# Patient Record
Sex: FEMALE | Race: WHITE | NOT HISPANIC OR LATINO | Employment: OTHER | URBAN - METROPOLITAN AREA
[De-identification: names, ages, dates, MRNs, and addresses within clinical notes are randomized per-mention and may not be internally consistent; named-entity substitution may affect disease eponyms.]

---

## 2023-02-03 PROBLEM — R10.819 ABDOMINAL TENDERNESS: Status: ACTIVE | Noted: 2023-02-03

## 2023-02-03 PROBLEM — J45.909 ASTHMA (HHS-HCC): Status: ACTIVE | Noted: 2023-02-03

## 2023-02-03 PROBLEM — R00.2 PALPITATIONS: Status: ACTIVE | Noted: 2023-02-03

## 2023-02-03 PROBLEM — M54.2 NECK PAIN: Status: ACTIVE | Noted: 2023-02-03

## 2023-02-03 PROBLEM — Z86.79 HISTORY OF ATRIAL FLUTTER: Status: ACTIVE | Noted: 2023-02-03

## 2023-02-03 PROBLEM — M65.30 TRIGGER FINGER: Status: ACTIVE | Noted: 2023-02-03

## 2023-02-03 PROBLEM — M25.562 PAIN IN LEFT KNEE: Status: ACTIVE | Noted: 2023-02-03

## 2023-02-03 PROBLEM — K80.20 GALLSTONES: Status: ACTIVE | Noted: 2023-02-03

## 2023-02-03 PROBLEM — I47.20 VENTRICULAR TACHYCARDIA (PAROXYSMAL): Status: ACTIVE | Noted: 2023-02-03

## 2023-02-03 PROBLEM — R79.89 ABNORMAL CBC: Status: ACTIVE | Noted: 2023-02-03

## 2023-02-03 PROBLEM — L30.9 ECZEMA: Status: ACTIVE | Noted: 2023-02-03

## 2023-02-03 PROBLEM — R53.1 DECREASED STRENGTH: Status: ACTIVE | Noted: 2023-02-03

## 2023-02-03 PROBLEM — M54.50 LOW BACK PAIN: Status: ACTIVE | Noted: 2023-02-03

## 2023-02-03 PROBLEM — L21.9 SEBORRHEIC DERMATITIS: Status: ACTIVE | Noted: 2023-02-03

## 2023-02-03 PROBLEM — R26.89 BALANCE PROBLEM: Status: ACTIVE | Noted: 2023-02-03
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
## 2023-02-03 PROBLEM — R63.4 WEIGHT LOSS: Status: ACTIVE | Noted: 2023-02-03

## 2023-02-03 PROBLEM — M54.16 LEFT LUMBAR RADICULOPATHY: Status: ACTIVE | Noted: 2023-02-03

## 2023-02-03 PROBLEM — R11.0 NAUSEA: Status: ACTIVE | Noted: 2023-02-03

## 2023-02-03 PROBLEM — R01.1 HEART MURMUR: Status: ACTIVE | Noted: 2023-02-03

## 2023-02-03 PROBLEM — M17.0 PRIMARY OSTEOARTHRITIS OF KNEES, BILATERAL: Status: ACTIVE | Noted: 2023-02-03

## 2023-02-03 PROBLEM — R25.2 CRAMPING OF HANDS: Status: ACTIVE | Noted: 2023-02-03

## 2023-02-03 PROBLEM — R06.02 SHORTNESS OF BREATH: Status: ACTIVE | Noted: 2023-02-03

## 2023-02-03 PROBLEM — R19.7 DIARRHEA: Status: ACTIVE | Noted: 2023-02-03

## 2023-02-03 PROBLEM — F41.9 ANXIETY: Status: ACTIVE | Noted: 2023-02-03

## 2023-02-03 PROBLEM — M25.552 LEFT HIP PAIN: Status: ACTIVE | Noted: 2023-02-03

## 2023-02-03 PROBLEM — M85.852 OSTEOPENIA OF LEFT FEMORAL NECK: Status: ACTIVE | Noted: 2023-02-03

## 2023-02-03 PROBLEM — I73.00 RAYNAUD'S PHENOMENON: Status: ACTIVE | Noted: 2023-02-03

## 2023-02-03 PROBLEM — M47.9 SPINE DEGENERATION: Status: ACTIVE | Noted: 2023-02-03

## 2023-02-03 PROBLEM — I25.10 CORONARY ARTERY DISEASE: Status: ACTIVE | Noted: 2023-02-03

## 2023-02-03 PROBLEM — D22.9 ATYPICAL MOLE: Status: ACTIVE | Noted: 2023-02-03

## 2023-02-03 PROBLEM — E03.9 HYPOTHYROIDISM, ADULT: Status: ACTIVE | Noted: 2023-02-03

## 2023-02-03 PROBLEM — H93.19 TINNITUS: Status: ACTIVE | Noted: 2023-02-03

## 2023-02-03 PROBLEM — R42 POSTURAL DIZZINESS: Status: ACTIVE | Noted: 2023-02-03

## 2023-02-03 PROBLEM — I10 ESSENTIAL HYPERTENSION: Status: ACTIVE | Noted: 2023-02-03

## 2023-02-03 PROBLEM — D64.9 LOW HEMOGLOBIN: Status: ACTIVE | Noted: 2023-02-03

## 2023-02-03 PROBLEM — R06.09 DOE (DYSPNEA ON EXERTION): Status: ACTIVE | Noted: 2023-02-03

## 2023-02-03 PROBLEM — R73.09 ELEVATED GLUCOSE: Status: ACTIVE | Noted: 2023-02-03

## 2023-02-03 PROBLEM — R94.31 ABNORMAL ECG: Status: ACTIVE | Noted: 2023-02-03

## 2023-02-03 PROBLEM — I47.29 VENTRICULAR TACHYCARDIA (PAROXYSMAL) (MULTI): Status: ACTIVE | Noted: 2023-02-03

## 2023-02-03 PROBLEM — R73.9 ELEVATED BLOOD SUGAR: Status: ACTIVE | Noted: 2023-02-03

## 2023-02-03 PROBLEM — E78.5 HYPERLIPIDEMIA: Status: ACTIVE | Noted: 2023-02-03

## 2023-02-03 PROBLEM — Z86.79 HISTORY OF PSVT (PAROXYSMAL SUPRAVENTRICULAR TACHYCARDIA): Status: ACTIVE | Noted: 2023-02-03

## 2023-02-03 PROBLEM — I48.0 PAROXYSMAL ATRIAL FIBRILLATION (MULTI): Status: ACTIVE | Noted: 2023-02-03

## 2023-02-03 PROBLEM — R13.19 OTHER DYSPHAGIA: Status: ACTIVE | Noted: 2023-02-03

## 2023-02-03 PROBLEM — F39 MOOD DISORDER (CMS-HCC): Status: ACTIVE | Noted: 2023-02-03

## 2023-02-03 PROBLEM — E53.8 VITAMIN B12 DEFICIENCY: Status: ACTIVE | Noted: 2023-02-03

## 2023-02-03 PROBLEM — S22.089A: Status: ACTIVE | Noted: 2023-02-03

## 2023-02-03 PROBLEM — R09.89 PALPABLE ABD. AORTA: Status: ACTIVE | Noted: 2023-02-03

## 2023-02-03 PROBLEM — E83.42 HYPOMAGNESEMIA: Status: ACTIVE | Noted: 2023-02-03

## 2023-02-03 PROBLEM — R07.89 ATYPICAL CHEST PAIN: Status: ACTIVE | Noted: 2023-02-03

## 2023-02-03 PROBLEM — K21.9 GERD (GASTROESOPHAGEAL REFLUX DISEASE): Status: ACTIVE | Noted: 2023-02-03

## 2023-02-03 PROBLEM — F33.9 RECURRENT MAJOR DEPRESSIVE DISORDER (CMS-HCC): Status: ACTIVE | Noted: 2023-02-03

## 2023-02-03 PROBLEM — R01.1 SYSTOLIC MURMUR: Status: ACTIVE | Noted: 2023-02-03

## 2023-02-03 PROBLEM — E66.3 OVERWEIGHT: Status: ACTIVE | Noted: 2023-02-03

## 2023-02-03 PROBLEM — L30.9 DERMATITIS: Status: ACTIVE | Noted: 2023-02-03

## 2023-02-03 PROBLEM — E55.9 VITAMIN D DEFICIENCY: Status: ACTIVE | Noted: 2023-02-03

## 2023-02-03 PROBLEM — E61.2 MAGNESIUM DEFICIENCY: Status: ACTIVE | Noted: 2023-02-03

## 2023-02-03 PROBLEM — G47.00 INSOMNIA: Status: ACTIVE | Noted: 2023-02-03

## 2023-02-03 RX ORDER — ROSUVASTATIN CALCIUM 20 MG/1
20 TABLET, COATED ORAL NIGHTLY
COMMUNITY

## 2023-02-03 RX ORDER — ALPRAZOLAM 0.25 MG/1
0.25 TABLET ORAL 2 TIMES DAILY PRN
COMMUNITY
Start: 2019-02-14 | End: 2023-03-17 | Stop reason: SDUPTHER

## 2023-02-03 RX ORDER — ATENOLOL 50 MG/1
TABLET ORAL
COMMUNITY
Start: 2019-03-12 | End: 2023-03-17

## 2023-02-03 RX ORDER — LEVOTHYROXINE SODIUM 125 UG/1
125 TABLET ORAL DAILY
COMMUNITY
Start: 2019-11-04 | End: 2023-06-09 | Stop reason: SDUPTHER

## 2023-02-03 RX ORDER — VALSARTAN 160 MG/1
160 TABLET ORAL DAILY
COMMUNITY
End: 2023-03-17 | Stop reason: SDDI

## 2023-02-03 RX ORDER — ASPIRIN 81 MG/1
81 TABLET ORAL DAILY
COMMUNITY

## 2023-02-03 RX ORDER — OMEPRAZOLE 20 MG/1
20 TABLET, DELAYED RELEASE ORAL DAILY
COMMUNITY
Start: 2022-06-24 | End: 2023-03-17

## 2023-02-03 RX ORDER — BECLOMETHASONE DIPROPIONATE HFA 40 UG/1
40 AEROSOL, METERED RESPIRATORY (INHALATION)
COMMUNITY
Start: 2022-08-31 | End: 2023-11-14 | Stop reason: WASHOUT

## 2023-02-03 RX ORDER — ZOLPIDEM TARTRATE 5 MG/1
5 TABLET ORAL NIGHTLY PRN
COMMUNITY
End: 2023-03-16 | Stop reason: SDUPTHER

## 2023-02-03 RX ORDER — BUPROPION HYDROCHLORIDE 150 MG/1
150 TABLET ORAL 2 TIMES DAILY
COMMUNITY
Start: 2021-06-18 | End: 2023-06-09 | Stop reason: SDUPTHER

## 2023-02-03 RX ORDER — KETOCONAZOLE 20 MG/ML
SHAMPOO, SUSPENSION TOPICAL
COMMUNITY
Start: 2019-08-19 | End: 2023-08-16 | Stop reason: SDUPTHER

## 2023-02-03 RX ORDER — NALOXONE HYDROCHLORIDE 4 MG/.1ML
4 SPRAY NASAL AS NEEDED
COMMUNITY
End: 2024-05-02 | Stop reason: ENTERED-IN-ERROR

## 2023-02-03 RX ORDER — SERTRALINE HYDROCHLORIDE 50 MG/1
50 TABLET, FILM COATED ORAL
COMMUNITY
End: 2023-08-16 | Stop reason: ALTCHOICE

## 2023-02-03 RX ORDER — ALBUTEROL SULFATE 90 UG/1
2 AEROSOL, METERED RESPIRATORY (INHALATION) EVERY 4 HOURS PRN
COMMUNITY
End: 2023-08-29 | Stop reason: SDUPTHER

## 2023-02-03 RX ORDER — TRAMADOL HYDROCHLORIDE 50 MG/1
50 TABLET ORAL 2 TIMES DAILY PRN
COMMUNITY
Start: 2022-06-24 | End: 2023-04-19 | Stop reason: SDUPTHER

## 2023-03-16 ENCOUNTER — TELEPHONE (OUTPATIENT)
Dept: PRIMARY CARE | Facility: CLINIC | Age: 78
End: 2023-03-16
Payer: MEDICARE

## 2023-03-16 DIAGNOSIS — F51.01 PRIMARY INSOMNIA: ICD-10-CM

## 2023-03-16 RX ORDER — ZOLPIDEM TARTRATE 5 MG/1
5 TABLET ORAL NIGHTLY PRN
Qty: 30 TABLET | Refills: 0 | Status: SHIPPED | OUTPATIENT
Start: 2023-03-16 | End: 2023-03-17 | Stop reason: SDUPTHER

## 2023-03-17 ENCOUNTER — LAB (OUTPATIENT)
Dept: LAB | Facility: LAB | Age: 78
End: 2023-03-17
Payer: MEDICARE

## 2023-03-17 ENCOUNTER — OFFICE VISIT (OUTPATIENT)
Dept: PRIMARY CARE | Facility: CLINIC | Age: 78
End: 2023-03-17
Payer: MEDICARE

## 2023-03-17 VITALS
DIASTOLIC BLOOD PRESSURE: 81 MMHG | HEART RATE: 88 BPM | SYSTOLIC BLOOD PRESSURE: 144 MMHG | HEIGHT: 63 IN | BODY MASS INDEX: 27.64 KG/M2 | WEIGHT: 156 LBS

## 2023-03-17 DIAGNOSIS — Z79.899 MEDICATION MANAGEMENT: ICD-10-CM

## 2023-03-17 DIAGNOSIS — F33.9 RECURRENT MAJOR DEPRESSIVE DISORDER, REMISSION STATUS UNSPECIFIED (CMS-HCC): ICD-10-CM

## 2023-03-17 DIAGNOSIS — E03.9 HYPOTHYROIDISM, ADULT: ICD-10-CM

## 2023-03-17 DIAGNOSIS — E83.42 HYPOMAGNESEMIA: ICD-10-CM

## 2023-03-17 DIAGNOSIS — E53.8 VITAMIN B12 DEFICIENCY: ICD-10-CM

## 2023-03-17 DIAGNOSIS — E55.9 VITAMIN D DEFICIENCY: ICD-10-CM

## 2023-03-17 DIAGNOSIS — Z00.00 WELL ADULT EXAM: ICD-10-CM

## 2023-03-17 DIAGNOSIS — F39 MOOD DISORDER (CMS-HCC): ICD-10-CM

## 2023-03-17 DIAGNOSIS — M54.42 LEFT-SIDED LOW BACK PAIN WITH LEFT-SIDED SCIATICA, UNSPECIFIED CHRONICITY: ICD-10-CM

## 2023-03-17 DIAGNOSIS — F51.01 PRIMARY INSOMNIA: ICD-10-CM

## 2023-03-17 DIAGNOSIS — G47.00 INSOMNIA, UNSPECIFIED TYPE: ICD-10-CM

## 2023-03-17 DIAGNOSIS — J45.909 ASTHMA, UNSPECIFIED ASTHMA SEVERITY, UNSPECIFIED WHETHER COMPLICATED, UNSPECIFIED WHETHER PERSISTENT (HHS-HCC): ICD-10-CM

## 2023-03-17 DIAGNOSIS — I48.0 PAROXYSMAL ATRIAL FIBRILLATION (MULTI): ICD-10-CM

## 2023-03-17 DIAGNOSIS — I10 ESSENTIAL HYPERTENSION: ICD-10-CM

## 2023-03-17 DIAGNOSIS — K21.9 GASTROESOPHAGEAL REFLUX DISEASE WITHOUT ESOPHAGITIS: ICD-10-CM

## 2023-03-17 DIAGNOSIS — Z00.00 WELL ADULT EXAM: Primary | ICD-10-CM

## 2023-03-17 DIAGNOSIS — E78.2 MIXED HYPERLIPIDEMIA: ICD-10-CM

## 2023-03-17 PROBLEM — R19.7 DIARRHEA: Status: RESOLVED | Noted: 2023-02-03 | Resolved: 2023-03-17

## 2023-03-17 PROBLEM — M65.30 TRIGGER FINGER: Status: RESOLVED | Noted: 2023-02-03 | Resolved: 2023-03-17

## 2023-03-17 PROBLEM — R73.09 ELEVATED GLUCOSE: Status: RESOLVED | Noted: 2023-02-03 | Resolved: 2023-03-17

## 2023-03-17 PROBLEM — R25.2 CRAMPING OF HANDS: Status: RESOLVED | Noted: 2023-02-03 | Resolved: 2023-03-17

## 2023-03-17 PROBLEM — R53.1 DECREASED STRENGTH: Status: RESOLVED | Noted: 2023-02-03 | Resolved: 2023-03-17

## 2023-03-17 PROBLEM — R73.9 ELEVATED BLOOD SUGAR: Status: RESOLVED | Noted: 2023-02-03 | Resolved: 2023-03-17

## 2023-03-17 PROBLEM — M25.562 PAIN IN LEFT KNEE: Status: RESOLVED | Noted: 2023-02-03 | Resolved: 2023-03-17

## 2023-03-17 PROBLEM — R01.1 SYSTOLIC MURMUR: Status: RESOLVED | Noted: 2023-02-03 | Resolved: 2023-03-17

## 2023-03-17 PROBLEM — R09.89 PALPABLE ABD. AORTA: Status: RESOLVED | Noted: 2023-02-03 | Resolved: 2023-03-17

## 2023-03-17 PROBLEM — R11.0 NAUSEA: Status: RESOLVED | Noted: 2023-02-03 | Resolved: 2023-03-17

## 2023-03-17 PROBLEM — L30.9 DERMATITIS: Status: RESOLVED | Noted: 2023-02-03 | Resolved: 2023-03-17

## 2023-03-17 PROBLEM — M25.552 LEFT HIP PAIN: Status: RESOLVED | Noted: 2023-02-03 | Resolved: 2023-03-17

## 2023-03-17 PROBLEM — M54.2 NECK PAIN: Status: RESOLVED | Noted: 2023-02-03 | Resolved: 2023-03-17

## 2023-03-17 PROBLEM — R10.819 ABDOMINAL TENDERNESS: Status: RESOLVED | Noted: 2023-02-03 | Resolved: 2023-03-17

## 2023-03-17 PROBLEM — E66.3 OVERWEIGHT WITH BODY MASS INDEX (BMI) OF 25 TO 25.9 IN ADULT: Status: RESOLVED | Noted: 2023-02-03 | Resolved: 2023-03-17

## 2023-03-17 LAB
ALANINE AMINOTRANSFERASE (SGPT) (U/L) IN SER/PLAS: 13 U/L (ref 7–45)
ALBUMIN (G/DL) IN SER/PLAS: 4.4 G/DL (ref 3.4–5)
ALKALINE PHOSPHATASE (U/L) IN SER/PLAS: 66 U/L (ref 33–136)
ANION GAP IN SER/PLAS: 15 MMOL/L (ref 10–20)
ASPARTATE AMINOTRANSFERASE (SGOT) (U/L) IN SER/PLAS: 21 U/L (ref 9–39)
BILIRUBIN TOTAL (MG/DL) IN SER/PLAS: 0.5 MG/DL (ref 0–1.2)
CALCIUM (MG/DL) IN SER/PLAS: 9.3 MG/DL (ref 8.6–10.3)
CARBON DIOXIDE, TOTAL (MMOL/L) IN SER/PLAS: 25 MMOL/L (ref 21–32)
CHLORIDE (MMOL/L) IN SER/PLAS: 105 MMOL/L (ref 98–107)
CHOLESTEROL (MG/DL) IN SER/PLAS: 210 MG/DL (ref 0–199)
CHOLESTEROL IN HDL (MG/DL) IN SER/PLAS: 73.2 MG/DL
CHOLESTEROL/HDL RATIO: 2.9
CREATININE (MG/DL) IN SER/PLAS: 0.76 MG/DL (ref 0.5–1.05)
GFR FEMALE: 81 ML/MIN/1.73M2
GLUCOSE (MG/DL) IN SER/PLAS: 99 MG/DL (ref 74–99)
MAGNESIUM (MG/DL) IN SER/PLAS: 1.65 MG/DL (ref 1.6–2.4)
NON-HDL CHOLESTEROL: 137 MG/DL
POTASSIUM (MMOL/L) IN SER/PLAS: 4.5 MMOL/L (ref 3.5–5.3)
PROTEIN TOTAL: 6.9 G/DL (ref 6.4–8.2)
SODIUM (MMOL/L) IN SER/PLAS: 140 MMOL/L (ref 136–145)
THYROTROPIN (MIU/L) IN SER/PLAS BY DETECTION LIMIT <= 0.05 MIU/L: 3.59 MIU/L (ref 0.44–3.98)
UREA NITROGEN (MG/DL) IN SER/PLAS: 30 MG/DL (ref 6–23)

## 2023-03-17 PROCEDURE — 80346 BENZODIAZEPINES1-12: CPT

## 2023-03-17 PROCEDURE — 84443 ASSAY THYROID STIM HORMONE: CPT

## 2023-03-17 PROCEDURE — 80354 DRUG SCREENING FENTANYL: CPT

## 2023-03-17 PROCEDURE — 83718 ASSAY OF LIPOPROTEIN: CPT

## 2023-03-17 PROCEDURE — 1159F MED LIST DOCD IN RCRD: CPT | Performed by: INTERNAL MEDICINE

## 2023-03-17 PROCEDURE — G0439 PPPS, SUBSEQ VISIT: HCPCS | Performed by: INTERNAL MEDICINE

## 2023-03-17 PROCEDURE — 80358 DRUG SCREENING METHADONE: CPT

## 2023-03-17 PROCEDURE — 83735 ASSAY OF MAGNESIUM: CPT

## 2023-03-17 PROCEDURE — 80307 DRUG TEST PRSMV CHEM ANLYZR: CPT

## 2023-03-17 PROCEDURE — 99214 OFFICE O/P EST MOD 30 MIN: CPT | Performed by: INTERNAL MEDICINE

## 2023-03-17 PROCEDURE — 80373 DRUG SCREENING TRAMADOL: CPT

## 2023-03-17 PROCEDURE — 3077F SYST BP >= 140 MM HG: CPT | Performed by: INTERNAL MEDICINE

## 2023-03-17 PROCEDURE — 80361 OPIATES 1 OR MORE: CPT

## 2023-03-17 PROCEDURE — 1036F TOBACCO NON-USER: CPT | Performed by: INTERNAL MEDICINE

## 2023-03-17 PROCEDURE — 3079F DIAST BP 80-89 MM HG: CPT | Performed by: INTERNAL MEDICINE

## 2023-03-17 PROCEDURE — 36415 COLL VENOUS BLD VENIPUNCTURE: CPT

## 2023-03-17 PROCEDURE — 80053 COMPREHEN METABOLIC PANEL: CPT

## 2023-03-17 PROCEDURE — 80365 DRUG SCREENING OXYCODONE: CPT

## 2023-03-17 PROCEDURE — 80368 SEDATIVE HYPNOTICS: CPT

## 2023-03-17 PROCEDURE — 82465 ASSAY BLD/SERUM CHOLESTEROL: CPT

## 2023-03-17 RX ORDER — ALPRAZOLAM 0.25 MG/1
0.25 TABLET ORAL 2 TIMES DAILY PRN
Qty: 60 TABLET | Refills: 0 | Status: SHIPPED | OUTPATIENT
Start: 2023-03-17 | End: 2023-04-17 | Stop reason: SDUPTHER

## 2023-03-17 RX ORDER — ZOLPIDEM TARTRATE 5 MG/1
5 TABLET ORAL NIGHTLY PRN
Qty: 30 TABLET | Refills: 0 | Status: SHIPPED | OUTPATIENT
Start: 2023-03-17 | End: 2023-04-17 | Stop reason: SDUPTHER

## 2023-03-17 RX ORDER — ATENOLOL 25 MG/1
25 TABLET ORAL DAILY
Qty: 30 TABLET | Refills: 11 | Status: SHIPPED | OUTPATIENT
Start: 2023-03-17 | End: 2023-07-31 | Stop reason: ALTCHOICE

## 2023-03-17 ASSESSMENT — ENCOUNTER SYMPTOMS
LOSS OF SENSATION IN FEET: 0
OCCASIONAL FEELINGS OF UNSTEADINESS: 0
DEPRESSION: 1

## 2023-03-17 ASSESSMENT — PATIENT HEALTH QUESTIONNAIRE - PHQ9
SUM OF ALL RESPONSES TO PHQ9 QUESTIONS 1 AND 2: 2
10. IF YOU CHECKED OFF ANY PROBLEMS, HOW DIFFICULT HAVE THESE PROBLEMS MADE IT FOR YOU TO DO YOUR WORK, TAKE CARE OF THINGS AT HOME, OR GET ALONG WITH OTHER PEOPLE: SOMEWHAT DIFFICULT
1. LITTLE INTEREST OR PLEASURE IN DOING THINGS: SEVERAL DAYS
2. FEELING DOWN, DEPRESSED OR HOPELESS: SEVERAL DAYS

## 2023-03-17 NOTE — PROGRESS NOTES
History of Present Illness  I have personally reviewed the OARRS report for LUCIANO TSE. I have considered the risks of abuse, dependence, addiction and diversion.   Is the patient prescribed a combination of a benzodiazepine and opioid? Yes.   Patient tolerating without AE. Benefit outweighs the risk. Discussed risks/benefits with patient. All questions answered. States understanding.     Last urine drug screening date/ordered today: 3/17/23  Results of last screen: Results as expected.   Controlled Substance Agreement:   I have printed this form and reviewed each line item with the patient and the patient has verbalized understanding.   Date of the last Controlled Substance Agreement: 03/17/23   OPIOID   What is the patient’s goal of therapy? pain.  Is this being achieved with current treatment? yes.   Attestation statement: I feel that it is clinically indicated to continue this current medication regimen after consideration of alternative therapies, and other non-opioid treatments.   Pain Scale Screening:   Pain Assessment and Documentation Tool (PADT)   Date of Assessment: 12/15/22  Analgesia:   Patient reports her pain level on average during the past week is 8 on a 0 - 10 scale.   Patient reports that her pain level at its worst during the past week was 9 on a 0 -10 scale.   50% % of pain has been relieved during the past week per patient   Patient states that the amount of pain relief she is now obtaining from her current pain reliever(s) is enough to make a real difference in her life.   Query to clinician: Is the patient's pain relief clinically significant? Yes  Activities of Daily Living:   Physical functioning: Better   Family relationships: Same   Social relationships: Same   Mood: Same   Sleep patterns: Same   Overall functioning: Better   Adverse Events:   No, LUCIANO TSE is not experiencing side effects from current pain reliever.  a. Nausea: None  b. Vomiting: None  c. Constipation:  None  d. Itching: None  e. Mental cloudiness: None  f. Sweating: None  g. Fatigue: None  h. Drowsiness: None  Patients overall severity of side effect: None  Is your overall impression that this patient is benefiting (e.g., benefits, such as pain relief, outweigh side effects) from opioid therapy? Yes   Specific Analgesic Plan: Continue present regimen.   BENZODIAZEPINES   What is the patient’s goal of therapy? anxiety.  Is this being achieved with current treatment? yes.   SOLE-7   1. Feeling nervous, anxious or on edge- several days  2. Not being able to stop or control worrying - several days  3. Worrying too much about different things - several days  4. Trouble relaxing - several days  5. Being so restless that it is hard to sit still - not at all  6. Becoming easily annoyed or irritable - several days  Total Score = 5  Activities of Daily Living:   Yes, it is my opinion that this patient is benefitting from benzodiazepine therapy.   Physical functioning: Better   Family relationships: Better   Social relationships: Better   Mood: Better   Sleep patterns: Better   Overall functioning: Better   SLEEP AIDS   What is the patient’s goal of therapy? sleep difficulties.  Is this being achieved with current treatment? yes.  Activities of Daily Living:   Physical functioning: Better   Family relationships: Better   Social relationships: Better   Mood: Better   Sleep patterns: Better   Overall functioning: Better

## 2023-03-17 NOTE — PROGRESS NOTES
Assessment and Plan:  Problem List Items Addressed This Visit    None      Chief Complaint:   Medicare Wellness Exam/Comprehensive Problem Focused Follow Up and Physical Exam    HPI:  Request Xanax and ambien refill ,formatted  Occ dizzy  Nausea  Low bp  Knee pain,back pain  Harder to do things  Depressed  Bc she cannot do things she wants  Not taking valsartan unless bp is high  When atenolol started she was heavier  81/47 bp yesterday    Active Problem List  Patient Active Problem List   Diagnosis    Abdominal tenderness    Abnormal CBC    Abnormal ECG    Anxiety    Asthma    Atypical chest pain    Atypical mole    Balance problem    Coronary artery disease    Cramping of hands    Decreased strength    Diarrhea    Dermatitis    Eczema    Seborrheic dermatitis    Elevated blood sugar    Elevated glucose    Essential hypertension    Fall    Gallstones    GERD (gastroesophageal reflux disease)    Heart murmur    History of atrial flutter    History of PSVT (paroxysmal supraventricular tachycardia)    Hyperlipidemia    Hypomagnesemia    Hypothyroidism, adult    Insomnia    Left hip pain    Pain in left knee    Overweight with body mass index (BMI) of 25 to 25.9 in adult    Other dysphagia    Osteopenia of left femoral neck    Left lumbar radiculopathy    Low back pain    Low hemoglobin    Magnesium deficiency    Mood disorder (CMS/HCC)    Nausea    Neck pain    Palpable abd. aorta    Palpitations    Paroxysmal atrial fibrillation (CMS/HCC)    Postural dizziness    Primary osteoarthritis of knees, bilateral    Raynaud's phenomenon    Recurrent major depressive disorder (CMS/HCC)    VARGAS (dyspnea on exertion)    Shortness of breath    Spine degeneration    Systolic murmur    T11 vertebral fracture (CMS/HCC)    Tinnitus    Trigger finger    Ventricular tachycardia (paroxysmal)    Vitamin B12 deficiency    Vitamin D deficiency    Weight loss       Comprehensive Medical/Surgical/Social/Family History  Past Medical History:    Diagnosis Date    Asymptomatic menopausal state     Menopause    Encounter for other screening for malignant neoplasm of breast 06/24/2022    Breast screening    Encounter for screening for malignant neoplasm of colon 06/24/2022    Colon cancer screening    Essential (primary) hypertension     Hypertension, benign    History of falling 06/24/2022    H/O fall    Old myocardial infarction     History of myocardial infarction    Other conditions influencing health status     No significant past surgical history    Other dysphagia 01/28/2022    Other dysphagia    Palpitations 06/02/2022    Palpitations    Personal history of diseases of the skin and subcutaneous tissue     History of alopecia    Personal history of diseases of the skin and subcutaneous tissue 06/18/2021    History of eczema    Personal history of other diseases of the circulatory system 11/03/2015    History of supraventricular tachycardia    Personal history of other diseases of the musculoskeletal system and connective tissue     History of osteoarthritis    Personal history of other diseases of urinary system     History of renal failure    Personal history of other endocrine, nutritional and metabolic disease     History of hypothyroidism    Personal history of other mental and behavioral disorders 06/18/2021    History of anxiety    Personal history of other specified conditions     History of prolonged Q-T interval on ECG    Personal history of other specified conditions     History of shortness of breath    Personal history of other specified conditions     History of fatigue    Polyp of corpus uteri     Uterine polyp    Tachycardia, unspecified     Wide-complex tachycardia    Unspecified asthma with (acute) exacerbation     Asthma exacerbation     Past Surgical History:   Procedure Laterality Date    NOSE SURGERY  11/02/2018    Nose Surgery    OTHER SURGICAL HISTORY  11/19/2021    Loop recorder insertion    OTHER SURGICAL HISTORY  01/28/2022  "   Cardioverter defibrillator insertion     Social History     Social History Narrative    Not on file       Allergies and Medications  Atorvastatin  Current Outpatient Medications   Medication Instructions    albuterol 90 mcg/actuation inhaler 2 puffs, inhalation, Every 4 hours PRN    ALPRAZolam (XANAX) 0.25 mg, oral, 2 times daily PRN    aspirin (ADULT LOW DOSE ASPIRIN) 81 mg, oral, Daily    atenolol (Tenormin) 50 mg tablet 1 tablet daily alternating with 2 tablets the opposite days    buPROPion XL (WELLBUTRIN XL) 150 mg, oral, 2 times daily    ketoconazole (NIZOral) 2 % shampoo UAD    levothyroxine (SYNTHROID, LEVOXYL) 125 mcg, oral, Daily    naloxone (NARCAN) 4 mg, nasal, As needed, May repeat every 2-3 minutes if needed, alternating nostrils, until medical assistance becomes available.    omeprazole OTC (PRILOSEC OTC) 20 mg, oral, Daily    Qvar RediHaler 40 mcg, inhalation, 2 times daily RT    rosuvastatin (CRESTOR) 20 mg, oral, Daily    sertraline (ZOLOFT) 50 mg, oral, Every Day    traMADol (ULTRAM) 50 mg, oral, 2 times daily PRN    valsartan (DIOVAN) 160 mg, oral, Daily    zolpidem (AMBIEN) 5 mg, oral, Nightly PRN       Medications and Supplements  prescribed by me and other practitioners or clinical pharmacist (such as prescriptions, OTC's, herbal therapies and supplements) were reviewed and documented in the medical record.    Tobacco/Alcohol/Opioid use, as well as Illicit Drug Use was screened for/reviewed and documented in Social History section and medication list as appropriate  Activities of Daily Living  In your present state of health, do you have any difficulty performing the following activities?:   Preparing food and eating?: No  Bathing yourself: No  Getting dressed: No  Using the toilet:No  Moving around from place to place: No  In the past year have you fallen or had a near fall?:Yes    Depression Screen  (Note: if answer to either of the following is \"Yes\", then a more complete depression " screening is indicated)   Q1: Over the past two weeks, have you felt down, depressed or hopeless? yes  Q2: Over the past two weeks, have you felt little interest or pleasure in doing things? yes  Current exercise habits: none   Dietary issues discussed: Yes  Hearing difficulties: Yes,doesnt wear hearing aids   Safe in current home environment: Yes  Visual Acuity assessed: No  Cognitive Impairment No    Advance directives  Advanced Care Planning (including a Living Will, Healthcare POA, as well as specific end of life choices and/or directives), was discussed for approximately 16 minutes with the patient and/or surrogate, voluntarily, and documented in the medical record.     Cardiac Risk Assessment  Cardiovascular risk was discussed and, if needed, lifestyle modifications recommended, including nutritional choices, exercise, and elimination of habits contributing to risk. We agreed on a plan to reduce the current cardiovascular risk based on above discussion as needed.  Aspirin use/disuse was discussed after reviewing the updated guidelines below:    Consider low dose Aspirin ( mg) use if the benefit for cardiovascular disease prevention outweighs risk for bleeding complications.   In general, low dose ASA should be considered:  In patients WITHOUT prior MI/stroke/PAD (primary prevention):   a. Age <60: Use if 10-year cardiovascular disease risk >20%, with discussion of risks and benefits with patient  b. Age 60-<70: Use if 10-year cardiovascular disease risk >20% and low bleeding (e.g., gastrointenstinal) risk, with discussion of risks and benefits with patient  c. Age >=70: Do not use    In patients WITH prior MI/stroke/PAD (secondary prevention):   Generally use unless extremely high bleeding (e.g., gastrointenstinal) risk, with discussion of risks and benefits with patient    ROS otherwise negative aside from what was mentioned above in HPI.    Vitals  There were no vitals taken for this visit.  There is  no height or weight on file to calculate BMI.  Physical Exam  Gen: Alert, NAD  HEENT:  PERRLA, EOMI, conjunctiva and sclera normal in appearance. External auditory canals/TMs normal; Oral cavity and posterior pharynx without lesions/exudate  Neck:  Supple with FROM; No masses/nodes palpable; Thyroid nontender and without nodules; No PATRICIA  Respiratory:  Lungs CTAB  Cardiovascular:  Heart RRR. No M/R/G. Peripheral pulses equal bilaterally  Abdomen:  Soft, nontender, BS present throughout; No R/G/R; No HSM or masses palpated  Extremities:  FROM all extremities; Muscle strength grossly normal with good tone  Neuro:  CN II-XII intact; Reflexes 2+/2+; Gross motor and sensory intact  Skin:  No suspicious lesions present  Breast: No masses, skin lesions or nipple discharges, no axillary lymphadenopathy      During the course of the visit the patient was educated and counseled about age appropriate screening and preventive services. Completed preventive screenings were documented in the chart and orders were placed for outstanding screenings/procedures as documented in the Assessment and Plan.    Chronic conditions reviewed in the assessment and plan.    Continue medications unless specified otherwise.  Previous labs reviewed.   Other specialty provider notes reviewed.     Patient Instructions (the written plan) was given to the patient at check out.

## 2023-03-21 LAB
6-ACETYLMORPHINE: <25 NG/ML
7-AMINOCLONAZEPAM: <25 NG/ML
ALPHA-HYDROXYALPRAZOLAM: 240 NG/ML
ALPHA-HYDROXYMIDAZOLAM: <25 NG/ML
ALPRAZOLAM: 107 NG/ML
AMPHETAMINE (PRESENCE) IN URINE BY SCREEN METHOD: ABNORMAL
BARBITURATES PRESENCE IN URINE BY SCREEN METHOD: ABNORMAL
CANNABINOIDS IN URINE BY SCREEN METHOD: ABNORMAL
CHLORDIAZEPOXIDE: <25 NG/ML
CLONAZEPAM: <25 NG/ML
COCAINE (PRESENCE) IN URINE BY SCREEN METHOD: ABNORMAL
CODEINE: <50 NG/ML
CREATINE, URINE FOR DRUG: 101.1 MG/DL
DIAZEPAM: <25 NG/ML
DRUG SCREEN COMMENT URINE: ABNORMAL
EDDP: <25 NG/ML
FENTANYL CONFIRMATION, URINE: <2.5 NG/ML
HYDROCODONE: <25 NG/ML
HYDROMORPHONE: <25 NG/ML
LORAZEPAM: <25 NG/ML
METHADONE CONFIRMATION,URINE: <25 NG/ML
MIDAZOLAM: <25 NG/ML
MORPHINE URINE: <50 NG/ML
NORDIAZEPAM: <25 NG/ML
NORFENTANYL: <2.5 NG/ML
NORHYDROCODONE: <25 NG/ML
NOROXYCODONE: <25 NG/ML
O-DESMETHYLTRAMADOL: >1000 NG/ML
OXAZEPAM: <25 NG/ML
OXYCODONE: <25 NG/ML
OXYMORPHONE: <25 NG/ML
PHENCYCLIDINE (PRESENCE) IN URINE BY SCREEN METHOD: ABNORMAL
TEMAZEPAM: <25 NG/ML
TRAMADOL: >1000 NG/ML
ZOLPIDEM METABOLITE (ZCA): 137 NG/ML
ZOLPIDEM: <25 NG/ML

## 2023-04-17 DIAGNOSIS — F51.01 PRIMARY INSOMNIA: ICD-10-CM

## 2023-04-17 DIAGNOSIS — F39 MOOD DISORDER (CMS-HCC): ICD-10-CM

## 2023-04-17 RX ORDER — ZOLPIDEM TARTRATE 5 MG/1
5 TABLET ORAL NIGHTLY PRN
Qty: 30 TABLET | Refills: 0 | Status: SHIPPED | OUTPATIENT
Start: 2023-04-17 | End: 2023-05-18 | Stop reason: SDUPTHER

## 2023-04-17 RX ORDER — ALPRAZOLAM 0.25 MG/1
0.25 TABLET ORAL 2 TIMES DAILY PRN
Qty: 60 TABLET | Refills: 0 | Status: SHIPPED | OUTPATIENT
Start: 2023-04-17 | End: 2023-05-18 | Stop reason: SDUPTHER

## 2023-04-17 NOTE — TELEPHONE ENCOUNTER
Patient left message for a refill of Xanax, Ambien and Tramadol  Formatted xanax and ambien for pcp approval.  I am not sure how to format the Tramadol correctly.  It is asking for pain levels and that is a hard stop on the RX.  Please also send Tramadol   DDM Nan in the pharmacy requested

## 2023-04-19 DIAGNOSIS — M47.9 SPINE DEGENERATION: ICD-10-CM

## 2023-04-19 RX ORDER — TRAMADOL HYDROCHLORIDE 50 MG/1
50 TABLET ORAL 2 TIMES DAILY PRN
Qty: 60 TABLET | Refills: 0 | Status: SHIPPED | OUTPATIENT
Start: 2023-04-19 | End: 2023-05-18 | Stop reason: SDUPTHER

## 2023-05-18 DIAGNOSIS — F51.01 PRIMARY INSOMNIA: ICD-10-CM

## 2023-05-18 DIAGNOSIS — F39 MOOD DISORDER (CMS-HCC): ICD-10-CM

## 2023-05-18 DIAGNOSIS — M47.9 SPINE DEGENERATION: ICD-10-CM

## 2023-05-19 RX ORDER — ZOLPIDEM TARTRATE 5 MG/1
5 TABLET ORAL NIGHTLY PRN
Qty: 30 TABLET | Refills: 0 | Status: SHIPPED | OUTPATIENT
Start: 2023-05-19 | End: 2023-06-19 | Stop reason: SDUPTHER

## 2023-05-19 RX ORDER — ALPRAZOLAM 0.25 MG/1
0.25 TABLET ORAL 2 TIMES DAILY PRN
Qty: 60 TABLET | Refills: 0 | Status: SHIPPED | OUTPATIENT
Start: 2023-05-19 | End: 2023-06-19 | Stop reason: SDUPTHER

## 2023-05-19 RX ORDER — TRAMADOL HYDROCHLORIDE 50 MG/1
50 TABLET ORAL 2 TIMES DAILY PRN
Qty: 60 TABLET | Refills: 0 | Status: SHIPPED | OUTPATIENT
Start: 2023-05-19 | End: 2023-06-19 | Stop reason: SDUPTHER

## 2023-06-09 DIAGNOSIS — F41.9 ANXIETY: ICD-10-CM

## 2023-06-09 DIAGNOSIS — E03.9 HYPOTHYROIDISM, ADULT: ICD-10-CM

## 2023-06-09 RX ORDER — BUPROPION HYDROCHLORIDE 150 MG/1
150 TABLET ORAL 2 TIMES DAILY
Qty: 180 TABLET | Refills: 3 | Status: SHIPPED | OUTPATIENT
Start: 2023-06-09 | End: 2024-05-07 | Stop reason: HOSPADM

## 2023-06-09 RX ORDER — LEVOTHYROXINE SODIUM 125 UG/1
125 TABLET ORAL DAILY
Qty: 90 TABLET | Refills: 3 | Status: SHIPPED | OUTPATIENT
Start: 2023-06-09 | End: 2024-05-02

## 2023-06-16 PROBLEM — F11.90 OPIATE USE: Status: ACTIVE | Noted: 2023-06-16

## 2023-06-19 ENCOUNTER — APPOINTMENT (OUTPATIENT)
Dept: PRIMARY CARE | Facility: CLINIC | Age: 78
End: 2023-06-19
Payer: MEDICARE

## 2023-06-19 DIAGNOSIS — M47.9 SPINE DEGENERATION: ICD-10-CM

## 2023-06-19 DIAGNOSIS — F39 MOOD DISORDER (CMS-HCC): ICD-10-CM

## 2023-06-19 DIAGNOSIS — F51.01 PRIMARY INSOMNIA: ICD-10-CM

## 2023-06-19 RX ORDER — ALPRAZOLAM 0.25 MG/1
0.25 TABLET ORAL 2 TIMES DAILY PRN
Qty: 60 TABLET | Refills: 0 | Status: SHIPPED | OUTPATIENT
Start: 2023-06-19 | End: 2023-07-20 | Stop reason: SDUPTHER

## 2023-06-19 RX ORDER — ZOLPIDEM TARTRATE 5 MG/1
5 TABLET ORAL NIGHTLY PRN
Qty: 30 TABLET | Refills: 0 | Status: SHIPPED | OUTPATIENT
Start: 2023-06-19 | End: 2023-07-18 | Stop reason: SDUPTHER

## 2023-06-19 RX ORDER — TRAMADOL HYDROCHLORIDE 50 MG/1
50 TABLET ORAL 2 TIMES DAILY PRN
Qty: 60 TABLET | Refills: 0 | Status: SHIPPED | OUTPATIENT
Start: 2023-06-19 | End: 2023-07-20 | Stop reason: SDUPTHER

## 2023-06-23 ENCOUNTER — APPOINTMENT (OUTPATIENT)
Dept: PRIMARY CARE | Facility: CLINIC | Age: 78
End: 2023-06-23
Payer: MEDICARE

## 2023-07-18 ENCOUNTER — TELEPHONE (OUTPATIENT)
Dept: PRIMARY CARE | Facility: CLINIC | Age: 78
End: 2023-07-18
Payer: MEDICARE

## 2023-07-18 DIAGNOSIS — F51.01 PRIMARY INSOMNIA: ICD-10-CM

## 2023-07-18 NOTE — TELEPHONE ENCOUNTER
Patient left message for a refill of Ambien, Xanax and  Tramadol  She is in compliance for Ambien so I formatted that RX. That is every 6 months.  She is out of compliance for Tramadol and Xanax. She needed to be seen in 3 months. Last appt was 3/17/23.  Need to be seen by 6/17/23. Next appt is 8/1/23

## 2023-07-19 RX ORDER — ZOLPIDEM TARTRATE 5 MG/1
5 TABLET ORAL NIGHTLY PRN
Qty: 30 TABLET | Refills: 0 | Status: SHIPPED | OUTPATIENT
Start: 2023-07-19 | End: 2023-08-16 | Stop reason: SDUPTHER

## 2023-07-19 NOTE — TELEPHONE ENCOUNTER
Please call patient for a CSV appt. She is out of compliance for refills of Tramadol and Xanax. She was last seen on 3/17/23 and needed to be seen by 6/17/23. She will need this appt to get her refills.  This can be a VV.  Ambien was sent in. That appt is every 6 months.  Her next appt is 8/1/23 with pcp unless she wants to wait for that appt to get refills?  Thank you

## 2023-07-20 ENCOUNTER — OFFICE VISIT (OUTPATIENT)
Dept: PRIMARY CARE | Facility: CLINIC | Age: 78
End: 2023-07-20
Payer: MEDICARE

## 2023-07-20 VITALS
OXYGEN SATURATION: 95 % | HEART RATE: 68 BPM | HEIGHT: 66 IN | BODY MASS INDEX: 25.07 KG/M2 | TEMPERATURE: 97.2 F | DIASTOLIC BLOOD PRESSURE: 75 MMHG | WEIGHT: 156 LBS | SYSTOLIC BLOOD PRESSURE: 123 MMHG

## 2023-07-20 DIAGNOSIS — Z79.899 MEDICATION MANAGEMENT: Primary | ICD-10-CM

## 2023-07-20 DIAGNOSIS — F11.90 OPIATE USE: ICD-10-CM

## 2023-07-20 DIAGNOSIS — R29.6 FREQUENT FALLS: ICD-10-CM

## 2023-07-20 DIAGNOSIS — F39 MOOD DISORDER (CMS-HCC): ICD-10-CM

## 2023-07-20 DIAGNOSIS — M47.9 SPINE DEGENERATION: ICD-10-CM

## 2023-07-20 DIAGNOSIS — M17.0 PRIMARY OSTEOARTHRITIS OF KNEES, BILATERAL: ICD-10-CM

## 2023-07-20 DIAGNOSIS — M85.852 OSTEOPENIA OF LEFT FEMORAL NECK: ICD-10-CM

## 2023-07-20 PROCEDURE — 3074F SYST BP LT 130 MM HG: CPT | Performed by: NURSE PRACTITIONER

## 2023-07-20 PROCEDURE — 3078F DIAST BP <80 MM HG: CPT | Performed by: NURSE PRACTITIONER

## 2023-07-20 PROCEDURE — 1159F MED LIST DOCD IN RCRD: CPT | Performed by: NURSE PRACTITIONER

## 2023-07-20 PROCEDURE — 1036F TOBACCO NON-USER: CPT | Performed by: NURSE PRACTITIONER

## 2023-07-20 PROCEDURE — 99214 OFFICE O/P EST MOD 30 MIN: CPT | Performed by: NURSE PRACTITIONER

## 2023-07-20 PROCEDURE — 1160F RVW MEDS BY RX/DR IN RCRD: CPT | Performed by: NURSE PRACTITIONER

## 2023-07-20 RX ORDER — ALPRAZOLAM 0.25 MG/1
0.25 TABLET ORAL 2 TIMES DAILY PRN
Qty: 60 TABLET | Refills: 0 | Status: SHIPPED | OUTPATIENT
Start: 2023-07-20 | End: 2023-08-16 | Stop reason: SDUPTHER

## 2023-07-20 RX ORDER — TRAMADOL HYDROCHLORIDE 50 MG/1
50 TABLET ORAL 2 TIMES DAILY PRN
Qty: 60 TABLET | Refills: 0 | Status: SHIPPED | OUTPATIENT
Start: 2023-07-20 | End: 2023-10-30 | Stop reason: SDUPTHER

## 2023-07-20 ASSESSMENT — ANXIETY QUESTIONNAIRES
5. BEING SO RESTLESS THAT IT IS HARD TO SIT STILL: NOT AT ALL
7. FEELING AFRAID AS IF SOMETHING AWFUL MIGHT HAPPEN: MORE THAN HALF THE DAYS
6. BECOMING EASILY ANNOYED OR IRRITABLE: SEVERAL DAYS
4. TROUBLE RELAXING: MORE THAN HALF THE DAYS
3. WORRYING TOO MUCH ABOUT DIFFERENT THINGS: MORE THAN HALF THE DAYS
1. FEELING NERVOUS, ANXIOUS, OR ON EDGE: MORE THAN HALF THE DAYS
2. NOT BEING ABLE TO STOP OR CONTROL WORRYING: MORE THAN HALF THE DAYS
GAD7 TOTAL SCORE: 11
IF YOU CHECKED OFF ANY PROBLEMS ON THIS QUESTIONNAIRE, HOW DIFFICULT HAVE THESE PROBLEMS MADE IT FOR YOU TO DO YOUR WORK, TAKE CARE OF THINGS AT HOME, OR GET ALONG WITH OTHER PEOPLE: SOMEWHAT DIFFICULT

## 2023-07-20 NOTE — PATIENT INSTRUCTIONS

## 2023-07-20 NOTE — PROGRESS NOTES
Problem List Items Addressed This Visit       Frequent falls    Overview     Patient was identified as a fall risk. Risk prevention instructions provided.  Patient declines PT referral and ortho referral at this time. Also declines imaging.          Current Assessment & Plan     Concerned with fx given recent fall  She declines imaging today  Would also consider decrease sedating medications given her age and falls risk  - seeing PCP in a couple weeks          Medication management - Primary    Overview     CSA, UDS, OARRS, CSV [facility] compliant   Q3m (benzo, opiate)  Q6m (ambien)           Current Assessment & Plan     Would consider decrease in ambien and/or xanax given falls risk          Mood disorder (CMS/HCC)    Overview     sx well managed.  no AE or SE.  continue current dose wellbutrin and xanax prn for now         Relevant Medications    ALPRAZolam (Xanax) 0.25 mg tablet    Opiate use    Overview     Narcan          Osteopenia of left femoral neck    Current Assessment & Plan     Concern with fx given recent fall  Declines imaging          Primary osteoarthritis of knees, bilateral    Current Assessment & Plan     L > R  Declines imaging, PT and ortho fu at this time         Spine degeneration    Overview     Uses tramadol prn          Relevant Medications    traMADol (Ultram) 50 mg tablet      Controlled Substance Visit:  I have personally reviewed the OARRS report and have considered the risks of abuse, dependence, addiction and diversion and I believe that it is clinically appropriate for the patient to be prescribed this medication.    Is the patient prescribed a combination of a benzodiazepine and opioid?  Yes, I feel it is clincially indicated to continue the medication and have discussed with the patient risks/benefits/alternatives.    Last Urine Drug Screen / ordered today: No  Recent Results (from the past [id number] hour(s))   OPIATE/OPIOID/BENZO PRESCRIPTION COMPLIANCE    Collection Time: 03/17/23  2:33  PM   Result Value Ref Range    DRUG SCREEN COMMENT URINE SEE BELOW     Creatine, Urine 101.1 mg/dL    Amphetamine Screen, Urine PRESUMPTIVE NEGATIVE NEGATIVE    Barbiturate Screen, Urine PRESUMPTIVE NEGATIVE NEGATIVE    Cannabinoid Screen, Urine PRESUMPTIVE NEGATIVE NEGATIVE    Cocaine Screen, Urine PRESUMPTIVE NEGATIVE NEGATIVE    PCP Screen, Urine PRESUMPTIVE NEGATIVE NEGATIVE    7-Aminoclonazepam <25 Cutoff <25 ng/mL    Alpha-Hydroxyalprazolam 240 (A) Cutoff <25 ng/mL    Alpha-Hydroxymidazolam <25 Cutoff <25 ng/mL    Alprazolam 107 (A) Cutoff <25 ng/mL    Chlordiazepoxide <25 Cutoff <25 ng/mL    Clonazepam <25 Cutoff <25 ng/mL    Diazepam <25 Cutoff <25 ng/mL    Lorazepam <25 Cutoff <25 ng/mL    Midazolam <25 Cutoff <25 ng/mL    Nordiazepam <25 Cutoff <25 ng/mL    Oxazepam <25 Cutoff <25 ng/mL    Temazepam <25 Cutoff <25 ng/mL    Zolpidem <25 Cutoff <25 ng/mL    Zolpidem Metabolite (ZCA) 137 (A) Cutoff <25 ng/mL    6-Acetylmorphine <25 Cutoff <25 ng/mL    Codeine <50 Cutoff <50 ng/mL    Hydrocodone <25 Cutoff <25 ng/mL    Hydromorphone <25 Cutoff <25 ng/mL    Morphine Urine <50 Cutoff <50 ng/mL    Norhydrocodone <25 Cutoff <25 ng/mL    Noroxycodone <25 Cutoff <25 ng/mL    Oxycodone <25 Cutoff <25 ng/mL    Oxymorphone <25 Cutoff <25 ng/mL    Tramadol >1000 (A) Cutoff <50 ng/mL    O-Desmethyltramadol >1000 (A) Cutoff <50 ng/mL    Fentanyl <2.5 Cutoff<2.5 ng/mL    Norfentanyl <2.5 Cutoff<2.5 ng/mL    METHADONE CONFIRMATION,URINE <25 Cutoff <25 ng/mL    EDDP <25 Cutoff <25 ng/mL   Amphetamine Confirm, Urine    Collection Time: 03/11/22  1:58 PM   Result Value Ref Range    Amphetamines,Urine <50 ng/mL    MDA, Urine <200 ng/mL    MDEA, Urine <200 ng/mL    MDMA, Urine <200 ng/mL    Methamphetamine Quant, Ur <200 ng/mL    Phentermine,Urine <200 ng/mL     Results are as expected.     Controlled Substance Agreement:  Date of the Last Agreement: 3/17/23  I have reviewed each line item on the Controlled Substance Agreement  including, but not limited to, the benefits, risks, and alternatives to treatment with a Controlled Substance medication(s). The patient has verbalized understanding and signed the agreement.    Opioids:  What is the patient's goal of therapy? Chronic pain   Is this being achieved with current treatment? Somewhat     I have calculated the patient's Morphine Dose Equivalent (MED): 10 MME/day  I have considered referral to Pain Management and/or a specialist, and do not feel it is necessary at this time.    I feel that it is clinically indicated to continue this current medication regimen after consideration of alternative therapies, and other non-opioid treatment.    Opioid Risk Screening:  No data recorded    Pain Assessment:  Analgesia  What was your pain level on average during the past week?: 6  What was your pain level at its worst during the past week?: 9  What percentage of your pain has been relieved during the past week?: 60 %  Is the amount of pain relief you are now obtaining from your current pain relievers enough to make a real difference in your life?: N    Activities of Daily Living  Physical Functioning: Better  Family Relationships: Better  Social Relationships: Same  Mood: Better  Sleep Patterns: Better  Overall Functioning: Same    Adverse Events  Is patient experiencing any side effects from current pain relievers?: N    , Benzodiazepines:  What is the patient's goal of therapy? Control anxiety sx   Is this being achieved with current treatment? Yes     SOLE-7:  Over the last 2 weeks, how often have you been bothered by any of the following problems?  Feeling nervous, anxious, or on edge: 2  Not being able to stop or control worryin  Worrying too much about different things: 2  Trouble relaxin  Being so restless that it is hard to sit still: 0  Becoming easily annoyed or irritable: 1  Feeling afraid as if something awful might happen: 2  SOLE-7 Total Score: 11      Activities of Daily  Living:   Is your overall impression that this patient is benefiting (symptom reduction outweighs side effects) from benzodiazepine therapy? Yes     1. Physical Functioning: Same  2. Family Relationship: Same  3. Social Relationship: Same  4. Mood: Same  5. Sleep Patterns: Same  6. Overall Function: Same, and     Sleep Aids:   What is the patient's goal of therapy? insomnia  Is this being achieved with current treatment? Yes     Activities of Daily Living:   Is your overall impression that this patient is benefiting (symptom reduction outweighs side effects) from sleep aid therapy? Yes     1. Physical Functioning: Better  2. Family Relationship: Same  3. Social Relationship: Same  4. Mood: Better  5. Sleep Patterns: Better  6. Overall Function: Better    Fallen this week  X 2  No head trauma  This is pattern x 4-5 years  She's been to PT  She's had imaging    Patient was identified as a fall risk. Risk prevention instructions provided.  Patient declines PT referral and ortho referral at this time. Also declines imaging.     Gen: Alert, NAD  HEENT:  PERRLA, EOMI, conjunctiva and sclera normal in appearance; Neck supple  Respiratory:  resp effort NL  M/S: using assistive device cane   Neuro:  Gross motor and sensory intact  Skin:  No suspicious lesions present   Mood: normal      Patient was identified as a fall risk. Risk prevention instructions provided.

## 2023-07-20 NOTE — ASSESSMENT & PLAN NOTE
Concerned with fx given recent fall  She declines imaging today  Would also consider decrease sedating medications given her age and falls risk  - seeing PCP in a couple weeks

## 2023-07-28 DIAGNOSIS — F51.01 PRIMARY INSOMNIA: ICD-10-CM

## 2023-07-28 DIAGNOSIS — E53.8 VITAMIN B12 DEFICIENCY: ICD-10-CM

## 2023-07-28 DIAGNOSIS — E55.9 VITAMIN D DEFICIENCY: ICD-10-CM

## 2023-07-28 DIAGNOSIS — E03.9 HYPOTHYROIDISM, ADULT: ICD-10-CM

## 2023-07-28 DIAGNOSIS — F33.9 RECURRENT MAJOR DEPRESSIVE DISORDER, REMISSION STATUS UNSPECIFIED (CMS-HCC): ICD-10-CM

## 2023-07-28 DIAGNOSIS — F39 MOOD DISORDER (CMS-HCC): ICD-10-CM

## 2023-07-28 DIAGNOSIS — G47.00 INSOMNIA, UNSPECIFIED TYPE: ICD-10-CM

## 2023-07-28 DIAGNOSIS — E78.2 MIXED HYPERLIPIDEMIA: ICD-10-CM

## 2023-07-28 DIAGNOSIS — I48.0 PAROXYSMAL ATRIAL FIBRILLATION (MULTI): ICD-10-CM

## 2023-07-28 DIAGNOSIS — I10 ESSENTIAL HYPERTENSION: ICD-10-CM

## 2023-07-28 DIAGNOSIS — M54.42 LEFT-SIDED LOW BACK PAIN WITH LEFT-SIDED SCIATICA, UNSPECIFIED CHRONICITY: ICD-10-CM

## 2023-07-28 DIAGNOSIS — Z00.00 WELL ADULT EXAM: ICD-10-CM

## 2023-07-28 DIAGNOSIS — E83.42 HYPOMAGNESEMIA: ICD-10-CM

## 2023-07-28 DIAGNOSIS — J45.909 ASTHMA, UNSPECIFIED ASTHMA SEVERITY, UNSPECIFIED WHETHER COMPLICATED, UNSPECIFIED WHETHER PERSISTENT (HHS-HCC): ICD-10-CM

## 2023-07-28 DIAGNOSIS — K21.9 GASTROESOPHAGEAL REFLUX DISEASE WITHOUT ESOPHAGITIS: ICD-10-CM

## 2023-07-31 DIAGNOSIS — I10 PRIMARY HYPERTENSION: Primary | ICD-10-CM

## 2023-07-31 RX ORDER — ATENOLOL 50 MG/1
50 TABLET ORAL DAILY
Qty: 30 TABLET | Refills: 11 | Status: SHIPPED | OUTPATIENT
Start: 2023-07-31 | End: 2023-12-15 | Stop reason: SDUPTHER

## 2023-07-31 RX ORDER — ATENOLOL 50 MG/1
50 TABLET ORAL DAILY
Qty: 90 TABLET | Refills: 3 | OUTPATIENT
Start: 2023-07-31 | End: 2024-07-30

## 2023-07-31 NOTE — TELEPHONE ENCOUNTER
Medication refused due to failing protocol.    Requested Prescriptions   Pending Prescriptions Disp Refills    atenolol (Tenormin) 50 mg tablet 90 tablet 3     Sig: Take 1 tablet (50 mg) by mouth once daily.       Beta-Blockers Protocol Passed - 7/28/2023  2:37 PM        Passed - Normal serum creatinine in past 9 months     Creatinine   Date Value Ref Range Status   03/17/2023 0.76 0.50 - 1.05 mg/dL Final             Passed - Normal serum potassium in past 9 months     Potassium   Date Value Ref Range Status   03/17/2023 4.5 3.5 - 5.3 mmol/L Final             Passed - BP under 140/90 in past year or if patient has diabetes, CAD, or PVD, BP under 130/80 in past year     BP Readings from Last 3 Encounters:   07/20/23 123/75   04/28/23 141/86   03/17/23 144/81               Passed - No active pregnancy on record        Passed - No pregnancy test in the past 12 months or most recent test was negative        Passed - Visit with relevant provider in past 12 months or upcoming 90 days     Recent Visits  Date Type Provider Dept   07/20/23 Office Visit [facility]ier[facility] [place] [phone] [facility]   03/17/23 Office Visit Rose Kim [facility] [place] [address] [facility]   Showing recent visits within past 365 days and meeting all other requirements  Future Appointments  Date Type Provider Dept   08/01/23 Appointment Rose Kim MD [place] [id number] [facility]   10/20/23 Appointment INDRA Gutierrez [place] [address] [facility]   Showing future appointments within next 90 days and meeting all other requirements              Passed - Medication not refilled in past 45 days (1.5 months)     No matching medication orders between 6/16/2023  9:39 AM and 7/31/2023  9:39 AM            Passed - Heart on record in the past 6 months

## 2023-08-01 ENCOUNTER — APPOINTMENT (OUTPATIENT)
Dept: PRIMARY CARE | Facility: CLINIC | Age: 78
End: 2023-08-01
Payer: MEDICARE

## 2023-08-16 ENCOUNTER — LAB (OUTPATIENT)
Dept: LAB | Facility: LAB | Age: 78
End: 2023-08-16
Payer: MEDICARE

## 2023-08-16 ENCOUNTER — OFFICE VISIT (OUTPATIENT)
Dept: PRIMARY CARE | Facility: CLINIC | Age: 78
End: 2023-08-16
Payer: MEDICARE

## 2023-08-16 VITALS
DIASTOLIC BLOOD PRESSURE: 78 MMHG | WEIGHT: 161 LBS | OXYGEN SATURATION: 97 % | SYSTOLIC BLOOD PRESSURE: 138 MMHG | BODY MASS INDEX: 25.99 KG/M2 | TEMPERATURE: 97.2 F | HEART RATE: 66 BPM

## 2023-08-16 DIAGNOSIS — R21 RASH: ICD-10-CM

## 2023-08-16 DIAGNOSIS — G47.9 SLEEP DIFFICULTIES: ICD-10-CM

## 2023-08-16 DIAGNOSIS — M54.9 BACK PAIN, UNSPECIFIED BACK LOCATION, UNSPECIFIED BACK PAIN LATERALITY, UNSPECIFIED CHRONICITY: ICD-10-CM

## 2023-08-16 DIAGNOSIS — E53.8 VITAMIN B12 DEFICIENCY: ICD-10-CM

## 2023-08-16 DIAGNOSIS — E83.42 HYPOMAGNESEMIA: ICD-10-CM

## 2023-08-16 DIAGNOSIS — I10 ESSENTIAL HYPERTENSION: ICD-10-CM

## 2023-08-16 DIAGNOSIS — M47.9 SPINE DEGENERATION: ICD-10-CM

## 2023-08-16 DIAGNOSIS — E55.9 VITAMIN D DEFICIENCY: ICD-10-CM

## 2023-08-16 DIAGNOSIS — E83.42 HYPOMAGNESEMIA: Primary | ICD-10-CM

## 2023-08-16 DIAGNOSIS — F39 MOOD DISORDER (CMS-HCC): ICD-10-CM

## 2023-08-16 DIAGNOSIS — R32 URINARY INCONTINENCE, UNSPECIFIED TYPE: ICD-10-CM

## 2023-08-16 DIAGNOSIS — F41.9 ANXIETY: ICD-10-CM

## 2023-08-16 DIAGNOSIS — M19.90 OSTEOARTHRITIS, UNSPECIFIED OSTEOARTHRITIS TYPE, UNSPECIFIED SITE: ICD-10-CM

## 2023-08-16 DIAGNOSIS — F51.01 PRIMARY INSOMNIA: ICD-10-CM

## 2023-08-16 LAB
ALANINE AMINOTRANSFERASE (SGPT) (U/L) IN SER/PLAS: 14 U/L (ref 7–45)
ALBUMIN (G/DL) IN SER/PLAS: 3.8 G/DL (ref 3.4–5)
ALKALINE PHOSPHATASE (U/L) IN SER/PLAS: 63 U/L (ref 33–136)
ANION GAP IN SER/PLAS: 13 MMOL/L (ref 10–20)
APPEARANCE, URINE: CLEAR
ASPARTATE AMINOTRANSFERASE (SGOT) (U/L) IN SER/PLAS: 19 U/L (ref 9–39)
BASOPHILS (10*3/UL) IN BLOOD BY AUTOMATED COUNT: 0.02 X10E9/L (ref 0–0.1)
BASOPHILS/100 LEUKOCYTES IN BLOOD BY AUTOMATED COUNT: 0.4 % (ref 0–2)
BILIRUBIN TOTAL (MG/DL) IN SER/PLAS: 0.7 MG/DL (ref 0–1.2)
BILIRUBIN, URINE: NEGATIVE
BLOOD, URINE: NEGATIVE
CALCIDIOL (25 OH VITAMIN D3) (NG/ML) IN SER/PLAS: 29 NG/ML
CALCIUM (MG/DL) IN SER/PLAS: 9.3 MG/DL (ref 8.6–10.3)
CARBON DIOXIDE, TOTAL (MMOL/L) IN SER/PLAS: 28 MMOL/L (ref 21–32)
CHLORIDE (MMOL/L) IN SER/PLAS: 103 MMOL/L (ref 98–107)
CHOLESTEROL (MG/DL) IN SER/PLAS: 192 MG/DL (ref 0–199)
CHOLESTEROL IN HDL (MG/DL) IN SER/PLAS: 73.5 MG/DL
CHOLESTEROL/HDL RATIO: 2.6
COBALAMIN (VITAMIN B12) (PG/ML) IN SER/PLAS: 192 PG/ML (ref 211–911)
COLOR, URINE: ABNORMAL
CREATININE (MG/DL) IN SER/PLAS: 0.64 MG/DL (ref 0.5–1.05)
EOSINOPHILS (10*3/UL) IN BLOOD BY AUTOMATED COUNT: 0.13 X10E9/L (ref 0–0.4)
EOSINOPHILS/100 LEUKOCYTES IN BLOOD BY AUTOMATED COUNT: 2.6 % (ref 0–6)
ERYTHROCYTE DISTRIBUTION WIDTH (RATIO) BY AUTOMATED COUNT: 13.9 % (ref 11.5–14.5)
ERYTHROCYTE MEAN CORPUSCULAR HEMOGLOBIN CONCENTRATION (G/DL) BY AUTOMATED: 31.8 G/DL (ref 32–36)
ERYTHROCYTE MEAN CORPUSCULAR VOLUME (FL) BY AUTOMATED COUNT: 103 FL (ref 80–100)
ERYTHROCYTES (10*6/UL) IN BLOOD BY AUTOMATED COUNT: 3.64 X10E12/L (ref 4–5.2)
GFR FEMALE: >90 ML/MIN/1.73M2
GLUCOSE (MG/DL) IN SER/PLAS: 100 MG/DL (ref 74–99)
GLUCOSE, URINE: NEGATIVE MG/DL
HEMATOCRIT (%) IN BLOOD BY AUTOMATED COUNT: 37.4 % (ref 36–46)
HEMOGLOBIN (G/DL) IN BLOOD: 11.9 G/DL (ref 12–16)
HYALINE CASTS, URINE: ABNORMAL /LPF
IMMATURE GRANULOCYTES/100 LEUKOCYTES IN BLOOD BY AUTOMATED COUNT: 0.4 % (ref 0–0.9)
KETONES, URINE: NEGATIVE MG/DL
LDL: 95 MG/DL (ref 0–99)
LEUKOCYTE ESTERASE, URINE: ABNORMAL
LEUKOCYTES (10*3/UL) IN BLOOD BY AUTOMATED COUNT: 5 X10E9/L (ref 4.4–11.3)
LYMPHOCYTES (10*3/UL) IN BLOOD BY AUTOMATED COUNT: 1.46 X10E9/L (ref 0.8–3)
LYMPHOCYTES/100 LEUKOCYTES IN BLOOD BY AUTOMATED COUNT: 29.4 % (ref 13–44)
MONOCYTES (10*3/UL) IN BLOOD BY AUTOMATED COUNT: 0.39 X10E9/L (ref 0.05–0.8)
MONOCYTES/100 LEUKOCYTES IN BLOOD BY AUTOMATED COUNT: 7.8 % (ref 2–10)
MUCUS, URINE: ABNORMAL /LPF
NEUTROPHILS (10*3/UL) IN BLOOD BY AUTOMATED COUNT: 2.95 X10E9/L (ref 1.6–5.5)
NEUTROPHILS/100 LEUKOCYTES IN BLOOD BY AUTOMATED COUNT: 59.4 % (ref 40–80)
NITRITE, URINE: NEGATIVE
PH, URINE: 5 (ref 5–8)
PLATELETS (10*3/UL) IN BLOOD AUTOMATED COUNT: 184 X10E9/L (ref 150–450)
POTASSIUM (MMOL/L) IN SER/PLAS: 4.2 MMOL/L (ref 3.5–5.3)
PROTEIN TOTAL: 6.3 G/DL (ref 6.4–8.2)
PROTEIN, URINE: NEGATIVE MG/DL
RBC, URINE: 5 /HPF (ref 0–5)
SODIUM (MMOL/L) IN SER/PLAS: 140 MMOL/L (ref 136–145)
SPECIFIC GRAVITY, URINE: 1.03 (ref 1–1.03)
SQUAMOUS EPITHELIAL CELLS, URINE: 1 /HPF
THYROTROPIN (MIU/L) IN SER/PLAS BY DETECTION LIMIT <= 0.05 MIU/L: 4.99 MIU/L (ref 0.44–3.98)
THYROXINE (T4) FREE (NG/DL) IN SER/PLAS: 0.81 NG/DL (ref 0.61–1.12)
TRIGLYCERIDE (MG/DL) IN SER/PLAS: 116 MG/DL (ref 0–149)
UREA NITROGEN (MG/DL) IN SER/PLAS: 15 MG/DL (ref 6–23)
UROBILINOGEN, URINE: <2 MG/DL (ref 0–1.9)
VLDL: 23 MG/DL (ref 0–40)
WBC, URINE: 13 /HPF (ref 0–5)

## 2023-08-16 PROCEDURE — 81001 URINALYSIS AUTO W/SCOPE: CPT

## 2023-08-16 PROCEDURE — 1160F RVW MEDS BY RX/DR IN RCRD: CPT | Performed by: INTERNAL MEDICINE

## 2023-08-16 PROCEDURE — 3078F DIAST BP <80 MM HG: CPT | Performed by: INTERNAL MEDICINE

## 2023-08-16 PROCEDURE — 1159F MED LIST DOCD IN RCRD: CPT | Performed by: INTERNAL MEDICINE

## 2023-08-16 PROCEDURE — 80053 COMPREHEN METABOLIC PANEL: CPT

## 2023-08-16 PROCEDURE — 87086 URINE CULTURE/COLONY COUNT: CPT

## 2023-08-16 PROCEDURE — 1036F TOBACCO NON-USER: CPT | Performed by: INTERNAL MEDICINE

## 2023-08-16 PROCEDURE — 84439 ASSAY OF FREE THYROXINE: CPT

## 2023-08-16 PROCEDURE — 85025 COMPLETE CBC W/AUTO DIFF WBC: CPT

## 2023-08-16 PROCEDURE — 82306 VITAMIN D 25 HYDROXY: CPT

## 2023-08-16 PROCEDURE — 87186 SC STD MICRODIL/AGAR DIL: CPT

## 2023-08-16 PROCEDURE — 80061 LIPID PANEL: CPT

## 2023-08-16 PROCEDURE — 84443 ASSAY THYROID STIM HORMONE: CPT

## 2023-08-16 PROCEDURE — 82607 VITAMIN B-12: CPT

## 2023-08-16 PROCEDURE — 3075F SYST BP GE 130 - 139MM HG: CPT | Performed by: INTERNAL MEDICINE

## 2023-08-16 PROCEDURE — 99214 OFFICE O/P EST MOD 30 MIN: CPT | Performed by: INTERNAL MEDICINE

## 2023-08-16 PROCEDURE — 87077 CULTURE AEROBIC IDENTIFY: CPT

## 2023-08-16 PROCEDURE — 36415 COLL VENOUS BLD VENIPUNCTURE: CPT

## 2023-08-16 RX ORDER — ACETAMINOPHEN 500 MG/1
1000 CAPSULE, LIQUID FILLED ORAL 2 TIMES DAILY
COMMUNITY
End: 2023-11-14 | Stop reason: WASHOUT

## 2023-08-16 RX ORDER — ZOLPIDEM TARTRATE 5 MG/1
5 TABLET ORAL NIGHTLY PRN
Qty: 30 TABLET | Refills: 3 | Status: SHIPPED | OUTPATIENT
Start: 2023-08-16 | End: 2023-12-15 | Stop reason: SDUPTHER

## 2023-08-16 RX ORDER — KETOCONAZOLE 20 MG/ML
SHAMPOO, SUSPENSION TOPICAL
Qty: 120 ML | Refills: 2 | Status: SHIPPED | OUTPATIENT
Start: 2023-08-16 | End: 2024-05-07 | Stop reason: HOSPADM

## 2023-08-16 RX ORDER — KETOCONAZOLE 20 MG/ML
SHAMPOO, SUSPENSION TOPICAL DAILY PRN
Qty: 120 ML | Refills: 2 | Status: SHIPPED | OUTPATIENT
Start: 2023-08-16 | End: 2023-08-16 | Stop reason: SDUPTHER

## 2023-08-16 RX ORDER — DULOXETIN HYDROCHLORIDE 20 MG/1
20 CAPSULE, DELAYED RELEASE ORAL DAILY
Qty: 30 CAPSULE | Refills: 1 | Status: SHIPPED | OUTPATIENT
Start: 2023-08-16 | End: 2023-10-20

## 2023-08-16 RX ORDER — ALPRAZOLAM 0.25 MG/1
0.25 TABLET ORAL 2 TIMES DAILY PRN
Qty: 60 TABLET | Refills: 0 | Status: SHIPPED | OUTPATIENT
Start: 2023-08-16 | End: 2023-09-18 | Stop reason: SDUPTHER

## 2023-08-16 ASSESSMENT — ANXIETY QUESTIONNAIRES
IF YOU CHECKED OFF ANY PROBLEMS ON THIS QUESTIONNAIRE, HOW DIFFICULT HAVE THESE PROBLEMS MADE IT FOR YOU TO DO YOUR WORK, TAKE CARE OF THINGS AT HOME, OR GET ALONG WITH OTHER PEOPLE: SOMEWHAT DIFFICULT
2. NOT BEING ABLE TO STOP OR CONTROL WORRYING: SEVERAL DAYS
4. TROUBLE RELAXING: MORE THAN HALF THE DAYS
5. BEING SO RESTLESS THAT IT IS HARD TO SIT STILL: NOT AT ALL
7. FEELING AFRAID AS IF SOMETHING AWFUL MIGHT HAPPEN: NOT AT ALL
1. FEELING NERVOUS, ANXIOUS, OR ON EDGE: NEARLY EVERY DAY
3. WORRYING TOO MUCH ABOUT DIFFERENT THINGS: SEVERAL DAYS
GAD7 TOTAL SCORE: 8
6. BECOMING EASILY ANNOYED OR IRRITABLE: SEVERAL DAYS

## 2023-08-16 ASSESSMENT — PATIENT HEALTH QUESTIONNAIRE - PHQ9
SUM OF ALL RESPONSES TO PHQ9 QUESTIONS 1 AND 2: 3
SUM OF ALL RESPONSES TO PHQ QUESTIONS 1-9: 13
5. POOR APPETITE OR OVEREATING: NOT AT ALL
7. TROUBLE CONCENTRATING ON THINGS, SUCH AS READING THE NEWSPAPER OR WATCHING TELEVISION: MORE THAN HALF THE DAYS
1. LITTLE INTEREST OR PLEASURE IN DOING THINGS: SEVERAL DAYS
3. TROUBLE FALLING OR STAYING ASLEEP OR SLEEPING TOO MUCH: MORE THAN HALF THE DAYS
6. FEELING BAD ABOUT YOURSELF - OR THAT YOU ARE A FAILURE OR HAVE LET YOURSELF OR YOUR FAMILY DOWN: MORE THAN HALF THE DAYS
10. IF YOU CHECKED OFF ANY PROBLEMS, HOW DIFFICULT HAVE THESE PROBLEMS MADE IT FOR YOU TO DO YOUR WORK, TAKE CARE OF THINGS AT HOME, OR GET ALONG WITH OTHER PEOPLE: SOMEWHAT DIFFICULT
8. MOVING OR SPEAKING SO SLOWLY THAT OTHER PEOPLE COULD HAVE NOTICED. OR THE OPPOSITE, BEING SO FIGETY OR RESTLESS THAT YOU HAVE BEEN MOVING AROUND A LOT MORE THAN USUAL: MORE THAN HALF THE DAYS
2. FEELING DOWN, DEPRESSED OR HOPELESS: MORE THAN HALF THE DAYS
9. THOUGHTS THAT YOU WOULD BE BETTER OFF DEAD, OR OF HURTING YOURSELF: NOT AT ALL
4. FEELING TIRED OR HAVING LITTLE ENERGY: MORE THAN HALF THE DAYS

## 2023-08-16 NOTE — PROGRESS NOTES
Problem List Items Addressed This Visit       Anxiety    Relevant Orders    Comprehensive Metabolic Panel    TSH with reflex to Free T4 if abnormal    Vitamin B12    Vitamin D, Total    CBC and Auto Differential    Lipid Panel    Essential hypertension    Relevant Orders    Comprehensive Metabolic Panel    TSH with reflex to Free T4 if abnormal    Vitamin B12    Vitamin D, Total    CBC and Auto Differential    Lipid Panel    Hypomagnesemia - Primary    Relevant Orders    Comprehensive Metabolic Panel    TSH with reflex to Free T4 if abnormal    Vitamin B12    Vitamin D, Total    CBC and Auto Differential    Lipid Panel    Insomnia    Relevant Medications    zolpidem (Ambien) 5 mg tablet    Mood disorder (CMS/HCC)    Relevant Medications    DULoxetine (Cymbalta) 20 mg DR capsule    ALPRAZolam (Xanax) 0.25 mg tablet    Spine degeneration    Vitamin B12 deficiency    Relevant Orders    Comprehensive Metabolic Panel    TSH with reflex to Free T4 if abnormal    Vitamin B12    Vitamin D, Total    CBC and Auto Differential    Lipid Panel    Vitamin D deficiency    Relevant Orders    Comprehensive Metabolic Panel    TSH with reflex to Free T4 if abnormal    Vitamin B12    Vitamin D, Total    CBC and Auto Differential    Lipid Panel     Other Visit Diagnoses       Sleep difficulties        Relevant Orders    Comprehensive Metabolic Panel    TSH with reflex to Free T4 if abnormal    Vitamin B12    Vitamin D, Total    CBC and Auto Differential    Lipid Panel    Osteoarthritis, unspecified osteoarthritis type, unspecified site        Relevant Orders    Comprehensive Metabolic Panel    TSH with reflex to Free T4 if abnormal    Vitamin B12    Vitamin D, Total    CBC and Auto Differential    Lipid Panel    Urinary incontinence, unspecified type        Relevant Orders    Urinalysis with Reflex Microscopic    Urine Culture    Back pain, unspecified back location, unspecified back pain laterality, unspecified chronicity        Rash         Relevant Medications    ketoconazole (NIZOral) 2 % shampoo        Stop tramadol to see if it helps w fall risk  Pt does not want any other treatment including xrays or pt  She feels she needs xanax for anxiety and ambien for sleep  Discussed meds can make her dizzy including bp meds   She states she will monitor bp and is aware of risks of meds  No alcohol use currently   House is equipped for falls and she does not want me to call either of her sons.     Controlled Substance Visit:  I have personally reviewed the OARRS report and have considered the risks of abuse, dependence, addiction and diversion and I believe that it is clinically appropriate for the patient to be prescribed this medication.    Is the patient prescribed a combination of a benzodiazepine and opioid?  Yes, I feel it is clincially indicated to continue the medication and have discussed with the patient risks/benefits/alternatives.    Last Urine Drug Screen / ordered today: No  Recent Results (from the past 20951 hour(s))   OPIATE/OPIOID/BENZO PRESCRIPTION COMPLIANCE    Collection Time: 03/17/23  2:33 PM   Result Value Ref Range    DRUG SCREEN COMMENT URINE SEE BELOW     Creatine, Urine 101.1 mg/dL    Amphetamine Screen, Urine PRESUMPTIVE NEGATIVE NEGATIVE    Barbiturate Screen, Urine PRESUMPTIVE NEGATIVE NEGATIVE    Cannabinoid Screen, Urine PRESUMPTIVE NEGATIVE NEGATIVE    Cocaine Screen, Urine PRESUMPTIVE NEGATIVE NEGATIVE    PCP Screen, Urine PRESUMPTIVE NEGATIVE NEGATIVE    7-Aminoclonazepam <25 Cutoff <25 ng/mL    Alpha-Hydroxyalprazolam 240 (A) Cutoff <25 ng/mL    Alpha-Hydroxymidazolam <25 Cutoff <25 ng/mL    Alprazolam 107 (A) Cutoff <25 ng/mL    Chlordiazepoxide <25 Cutoff <25 ng/mL    Clonazepam <25 Cutoff <25 ng/mL    Diazepam <25 Cutoff <25 ng/mL    Lorazepam <25 Cutoff <25 ng/mL    Midazolam <25 Cutoff <25 ng/mL    Nordiazepam <25 Cutoff <25 ng/mL    Oxazepam <25 Cutoff <25 ng/mL    Temazepam <25 Cutoff <25 ng/mL    Zolpidem <25  Cutoff <25 ng/mL    Zolpidem Metabolite (ZCA) 137 (A) Cutoff <25 ng/mL    6-Acetylmorphine <25 Cutoff <25 ng/mL    Codeine <50 Cutoff <50 ng/mL    Hydrocodone <25 Cutoff <25 ng/mL    Hydromorphone <25 Cutoff <25 ng/mL    Morphine Urine <50 Cutoff <50 ng/mL    Norhydrocodone <25 Cutoff <25 ng/mL    Noroxycodone <25 Cutoff <25 ng/mL    Oxycodone <25 Cutoff <25 ng/mL    Oxymorphone <25 Cutoff <25 ng/mL    Tramadol >1000 (A) Cutoff <50 ng/mL    O-Desmethyltramadol >1000 (A) Cutoff <50 ng/mL    Fentanyl <2.5 Cutoff<2.5 ng/mL    Norfentanyl <2.5 Cutoff<2.5 ng/mL    METHADONE CONFIRMATION,URINE <25 Cutoff <25 ng/mL    EDDP <25 Cutoff <25 ng/mL   Amphetamine Confirm, Urine    Collection Time: 03/11/22  1:58 PM   Result Value Ref Range    Amphetamines,Urine <50 ng/mL    MDA, Urine <200 ng/mL    MDEA, Urine <200 ng/mL    MDMA, Urine <200 ng/mL    Methamphetamine Quant, Ur <200 ng/mL    Phentermine,Urine <200 ng/mL     Results are as expected.     Controlled Substance Agreement:  Date of the Last Agreement: 3/17/23  I have reviewed each line item on the Controlled Substance Agreement including, but not limited to, the benefits, risks, and alternatives to treatment with a Controlled Substance medication(s). The patient has verbalized understanding and signed the agreement.    Opioids:  What is the patient's goal of therapy? pain  Is this being achieved with current treatment? Yes  But unsure if any better than tylenol     I have calculated the patient's Morphine Dose Equivalent (MED):   I have considered referral to Pain Management and/or a specialist, and do not feel it is necessary at this time.  Dizzy intermittently  Occ palpitations sees cardiology next month  Takes atenolol regularly   Still has had some under 100 sys bp  Discussed to hold or take half pending bp number under 100   Incont of urine  Some sudden onset   Does not feel it is a uti  Fell twice over past month   One time rib one month ago now better  Has not  seen cardiology since  June  Goes back in sept  Last week fell on spine  Fell backward  Knee pain sharp pain can give out   Went to [facility]  Went to pt  Np milks pt did not like  Does not want to go back   Lots of grab bars  Has stair lift  One son is local   Pt does not notice difference w tramadol vs tylenol  Depressed due to pain   Takes valsartan prn high bp   Pt does not want me to call sons regarding care   Intermittent sciatica   Does not want xray or pt     Pain Assessment:  No data recorded, Benzodiazepines:  What is the patient's goal of therapy? anxiety  Is this being achieved with current treatment? yes    Activities of Daily Living:   Is your overall impression that this patient is benefiting (symptom reduction outweighs side effects) from benzodiazepine therapy? Yes     1. Physical Functioning: Better  2. Family Relationship: Better  3. Social Relationship: Better  4. Mood: Better  5. Sleep Patterns: Better  6. Overall Function: Better, and Sleep Aids:   What is the patient's goal of therapy? sleep  Is this being achieved with current treatment? yes    Activities of Daily Living:   Is your overall impression that this patient is benefiting (symptom reduction outweighs side effects) from sleep aid therapy? Yes     1. Physical Functioning: Better  2. Family Relationship: Better  3. Social Relationship: Better  4. Mood: Better  5. Sleep Patterns: Better  6. Overall Function: Better      Gen: Alert, NAD  HEENT:   conjunctiva and sclera normal in appearance; Neck supple  Respiratory:  Lungs CTAB  Cardiovascular:  Heart RRR. No M/R/G  Abdomen: soft, BS X 4  Neuro:  Gross motor and sensory intact  Skin:  No suspicious lesions present   Mood: normal    Has therese Arellanoa was seen today for follow-up.  Diagnoses and all orders for this visit:  Hypomagnesemia (Primary)  -     Comprehensive Metabolic Panel; Future  -     TSH with reflex to Free T4 if abnormal; Future  -     Vitamin B12; Future  -      Vitamin D, Total; Future  -     CBC and Auto Differential; Future  -     Lipid Panel; Future  Essential hypertension  -     Comprehensive Metabolic Panel; Future  -     TSH with reflex to Free T4 if abnormal; Future  -     Vitamin B12; Future  -     Vitamin D, Total; Future  -     CBC and Auto Differential; Future  -     Lipid Panel; Future  Vitamin D deficiency  -     Comprehensive Metabolic Panel; Future  -     TSH with reflex to Free T4 if abnormal; Future  -     Vitamin B12; Future  -     Vitamin D, Total; Future  -     CBC and Auto Differential; Future  -     Lipid Panel; Future  Vitamin B12 deficiency  -     Comprehensive Metabolic Panel; Future  -     TSH with reflex to Free T4 if abnormal; Future  -     Vitamin B12; Future  -     Vitamin D, Total; Future  -     CBC and Auto Differential; Future  -     Lipid Panel; Future  Anxiety  -     Comprehensive Metabolic Panel; Future  -     TSH with reflex to Free T4 if abnormal; Future  -     Vitamin B12; Future  -     Vitamin D, Total; Future  -     CBC and Auto Differential; Future  -     Lipid Panel; Future  Sleep difficulties  -     Comprehensive Metabolic Panel; Future  -     TSH with reflex to Free T4 if abnormal; Future  -     Vitamin B12; Future  -     Vitamin D, Total; Future  -     CBC and Auto Differential; Future  -     Lipid Panel; Future  Osteoarthritis, unspecified osteoarthritis type, unspecified site  -     Comprehensive Metabolic Panel; Future  -     TSH with reflex to Free T4 if abnormal; Future  -     Vitamin B12; Future  -     Vitamin D, Total; Future  -     CBC and Auto Differential; Future  -     Lipid Panel; Future  Urinary incontinence, unspecified type  -     Urinalysis with Reflex Microscopic; Future  -     Urine Culture; Future  Back pain, unspecified back location, unspecified back pain laterality, unspecified chronicity  -     Cancel: XR lumbar spine 2-3 views; Future  Spine degeneration  Primary insomnia  -     zolpidem (Ambien) 5 mg  tablet; Take 1 tablet (5 mg) by mouth as needed at bedtime for sleep.  Mood disorder (CMS/HCC)  -     DULoxetine (Cymbalta) 20 mg DR capsule; Take 1 capsule (20 mg) by mouth once daily. Do not crush or chew.  -     ALPRAZolam (Xanax) 0.25 mg tablet; Take 1 tablet (0.25 mg) by mouth 2 times a day as needed for anxiety.  Rash  -     ketoconazole (NIZOral) 2 % shampoo; Apply topically once daily as needed for dandruff.

## 2023-08-18 DIAGNOSIS — N39.0 URINARY TRACT INFECTION WITHOUT HEMATURIA, SITE UNSPECIFIED: Primary | ICD-10-CM

## 2023-08-18 LAB — URINE CULTURE: ABNORMAL

## 2023-08-18 RX ORDER — SULFAMETHOXAZOLE AND TRIMETHOPRIM 800; 160 MG/1; MG/1
1 TABLET ORAL 2 TIMES DAILY
Qty: 14 TABLET | Refills: 0 | Status: SHIPPED | OUTPATIENT
Start: 2023-08-18 | End: 2023-08-25

## 2023-08-21 DIAGNOSIS — E53.8 VITAMIN B12 DEFICIENCY: ICD-10-CM

## 2023-08-21 RX ORDER — CYANOCOBALAMIN 1000 UG/ML
1000 INJECTION, SOLUTION INTRAMUSCULAR; SUBCUTANEOUS SEE ADMIN INSTRUCTIONS
Qty: 4 ML | Refills: 0 | Status: SHIPPED | OUTPATIENT
Start: 2023-08-21 | End: 2023-08-22

## 2023-08-21 RX ORDER — SYRINGE W-NEEDLE,DISPOSAB,3 ML 25GX5/8"
1 SYRINGE, EMPTY DISPOSABLE MISCELLANEOUS SEE ADMIN INSTRUCTIONS
Qty: 4 EACH | Refills: 0 | Status: SHIPPED | OUTPATIENT
Start: 2023-08-21 | End: 2023-10-25 | Stop reason: ALTCHOICE

## 2023-08-22 RX ORDER — CYANOCOBALAMIN 1000 UG/ML
1000 INJECTION, SOLUTION INTRAMUSCULAR; SUBCUTANEOUS
Qty: 4 ML | Refills: 0 | Status: SHIPPED | OUTPATIENT
Start: 2023-08-22 | End: 2023-10-25 | Stop reason: ALTCHOICE

## 2023-08-29 DIAGNOSIS — J45.909 ASTHMA, UNSPECIFIED ASTHMA SEVERITY, UNSPECIFIED WHETHER COMPLICATED, UNSPECIFIED WHETHER PERSISTENT (HHS-HCC): ICD-10-CM

## 2023-08-29 RX ORDER — ALBUTEROL SULFATE 90 UG/1
2 AEROSOL, METERED RESPIRATORY (INHALATION) EVERY 4 HOURS PRN
Qty: 18 G | Refills: 3 | Status: SHIPPED | OUTPATIENT
Start: 2023-08-29 | End: 2024-08-28

## 2023-09-18 DIAGNOSIS — F39 MOOD DISORDER (CMS-HCC): ICD-10-CM

## 2023-09-18 RX ORDER — ALPRAZOLAM 0.25 MG/1
0.25 TABLET ORAL 2 TIMES DAILY PRN
Qty: 60 TABLET | Refills: 0 | Status: SHIPPED | OUTPATIENT
Start: 2023-09-18 | End: 2023-10-17 | Stop reason: SDUPTHER

## 2023-10-17 ENCOUNTER — TELEPHONE (OUTPATIENT)
Dept: PRIMARY CARE | Facility: CLINIC | Age: 78
End: 2023-10-17
Payer: MEDICARE

## 2023-10-17 DIAGNOSIS — F39 MOOD DISORDER (CMS-HCC): ICD-10-CM

## 2023-10-17 NOTE — TELEPHONE ENCOUNTER
Pt is requesting a refill on her xanax. She has an upcoming appt 10/20 with you. Did you want to do an early fill or wait until he pt comes in?

## 2023-10-18 RX ORDER — ALPRAZOLAM 0.25 MG/1
0.25 TABLET ORAL 2 TIMES DAILY PRN
Qty: 60 TABLET | Refills: 0 | Status: SHIPPED | OUTPATIENT
Start: 2023-10-18 | End: 2023-11-17 | Stop reason: SDUPTHER

## 2023-10-20 ENCOUNTER — APPOINTMENT (OUTPATIENT)
Dept: PRIMARY CARE | Facility: CLINIC | Age: 78
End: 2023-10-20
Payer: MEDICARE

## 2023-10-20 DIAGNOSIS — Z86.59 PERSONAL HISTORY OF OTHER MENTAL AND BEHAVIORAL DISORDERS: ICD-10-CM

## 2023-10-20 DIAGNOSIS — F39 MOOD DISORDER (CMS-HCC): ICD-10-CM

## 2023-10-20 RX ORDER — DULOXETIN HYDROCHLORIDE 20 MG/1
20 CAPSULE, DELAYED RELEASE ORAL DAILY
Qty: 30 CAPSULE | Refills: 1 | Status: SHIPPED | OUTPATIENT
Start: 2023-10-20 | End: 2023-10-30 | Stop reason: SDUPTHER

## 2023-10-20 RX ORDER — SERTRALINE HYDROCHLORIDE 50 MG/1
50 TABLET, FILM COATED ORAL DAILY
Qty: 135 TABLET | Refills: 1 | OUTPATIENT
Start: 2023-10-20

## 2023-10-24 ENCOUNTER — LAB (OUTPATIENT)
Dept: LAB | Facility: LAB | Age: 78
End: 2023-10-24
Payer: MEDICARE

## 2023-10-24 DIAGNOSIS — E03.9 HYPOTHYROIDISM, UNSPECIFIED: Primary | ICD-10-CM

## 2023-10-24 DIAGNOSIS — Z79.899 OTHER LONG TERM (CURRENT) DRUG THERAPY: ICD-10-CM

## 2023-10-24 DIAGNOSIS — E78.5 HYPERLIPIDEMIA, UNSPECIFIED: ICD-10-CM

## 2023-10-24 DIAGNOSIS — R93.1 ABNORMAL FINDINGS ON DIAGNOSTIC IMAGING OF HEART AND CORONARY CIRCULATION: ICD-10-CM

## 2023-10-24 DIAGNOSIS — E83.42 HYPOMAGNESEMIA: ICD-10-CM

## 2023-10-24 LAB
ALBUMIN SERPL BCP-MCNC: 4.2 G/DL (ref 3.4–5)
ALP SERPL-CCNC: 74 U/L (ref 33–136)
ALT SERPL W P-5'-P-CCNC: 51 U/L (ref 7–45)
AST SERPL W P-5'-P-CCNC: 40 U/L (ref 9–39)
BILIRUB DIRECT SERPL-MCNC: 0.1 MG/DL (ref 0–0.3)
BILIRUB SERPL-MCNC: 0.5 MG/DL (ref 0–1.2)
CHOLEST SERPL-MCNC: 139 MG/DL (ref 0–199)
CHOLESTEROL/HDL RATIO: 1.6
ERYTHROCYTE [SEDIMENTATION RATE] IN BLOOD BY WESTERGREN METHOD: 20 MM/H (ref 0–30)
HDLC SERPL-MCNC: 87.9 MG/DL
LDLC SERPL CALC-MCNC: 26 MG/DL
NON HDL CHOLESTEROL: 51 MG/DL (ref 0–149)
PROT SERPL-MCNC: 7.1 G/DL (ref 6.4–8.2)
TRIGL SERPL-MCNC: 124 MG/DL (ref 0–149)
VLDL: 25 MG/DL (ref 0–40)

## 2023-10-24 PROCEDURE — 80061 LIPID PANEL: CPT

## 2023-10-24 PROCEDURE — 36415 COLL VENOUS BLD VENIPUNCTURE: CPT

## 2023-10-24 PROCEDURE — 85652 RBC SED RATE AUTOMATED: CPT

## 2023-10-24 PROCEDURE — 80076 HEPATIC FUNCTION PANEL: CPT

## 2023-10-25 ENCOUNTER — TELEMEDICINE (OUTPATIENT)
Dept: PRIMARY CARE | Facility: CLINIC | Age: 78
End: 2023-10-25
Payer: MEDICARE

## 2023-10-25 DIAGNOSIS — Z79.891 LONG TERM (CURRENT) USE OF OPIATE ANALGESIC: ICD-10-CM

## 2023-10-25 DIAGNOSIS — F51.01 PRIMARY INSOMNIA: ICD-10-CM

## 2023-10-25 DIAGNOSIS — M47.9 SPINE DEGENERATION: ICD-10-CM

## 2023-10-25 DIAGNOSIS — F41.9 ANXIETY: Primary | ICD-10-CM

## 2023-10-25 DIAGNOSIS — Z79.899 MEDICATION MANAGEMENT: ICD-10-CM

## 2023-10-25 PROCEDURE — 99213 OFFICE O/P EST LOW 20 MIN: CPT | Performed by: NURSE PRACTITIONER

## 2023-10-25 RX ORDER — VALSARTAN 160 MG/1
160 TABLET ORAL DAILY PRN
COMMUNITY
End: 2023-12-15 | Stop reason: SDUPTHER

## 2023-10-25 ASSESSMENT — ENCOUNTER SYMPTOMS
LOSS OF SENSATION IN FEET: 0
OCCASIONAL FEELINGS OF UNSTEADINESS: 1
DEPRESSION: 0

## 2023-10-25 ASSESSMENT — ANXIETY QUESTIONNAIRES
7. FEELING AFRAID AS IF SOMETHING AWFUL MIGHT HAPPEN: NOT AT ALL
1. FEELING NERVOUS, ANXIOUS, OR ON EDGE: SEVERAL DAYS
3. WORRYING TOO MUCH ABOUT DIFFERENT THINGS: SEVERAL DAYS
GAD7 TOTAL SCORE: 6
2. NOT BEING ABLE TO STOP OR CONTROL WORRYING: SEVERAL DAYS
IF YOU CHECKED OFF ANY PROBLEMS ON THIS QUESTIONNAIRE, HOW DIFFICULT HAVE THESE PROBLEMS MADE IT FOR YOU TO DO YOUR WORK, TAKE CARE OF THINGS AT HOME, OR GET ALONG WITH OTHER PEOPLE: SOMEWHAT DIFFICULT
4. TROUBLE RELAXING: SEVERAL DAYS
6. BECOMING EASILY ANNOYED OR IRRITABLE: SEVERAL DAYS
5. BEING SO RESTLESS THAT IT IS HARD TO SIT STILL: SEVERAL DAYS

## 2023-10-25 ASSESSMENT — PATIENT HEALTH QUESTIONNAIRE - PHQ9
2. FEELING DOWN, DEPRESSED OR HOPELESS: NOT AT ALL
SUM OF ALL RESPONSES TO PHQ9 QUESTIONS 1 AND 2: 0
1. LITTLE INTEREST OR PLEASURE IN DOING THINGS: NOT AT ALL

## 2023-10-25 NOTE — PROGRESS NOTES
An interactive audio/visual  telecommunication system which permits real time communications between the patient (at the originating site) and provider (at the distant site) was utilized to provide this telehealth service.    Verbal consent was requested and obtained from the patient for this telehealth visit.  We have confirmed the patient wishes to see me, Brie Posada, a board certified nurse practitioner with an active [place] advanced practice license as well as verified the patient's identity and physical location in [place].      Problem List Items Addressed This Visit       Anxiety - Primary    Overview     sx well managed.  no AE or SE.  continue current dose wellbutrin, cymbalta, xanax prn          Insomnia    Overview     sx well managed.  no AE or SE.  continue current dose ambien prn         Long term (current) use of opiate analgesic    Overview     Narcan          Medication management    Overview     CSA, UDS, OARRS, CSV [facility] compliant   Q3m (benzo, opiate)  Q6m (ambien)           Spine degeneration    Overview     Using tylenol and tramadol prn  Cymbalta also              Controlled Substance Visit:  I have personally reviewed the OARRS report and have considered the risks of abuse, dependence, addiction and diversion and I believe that it is clinically appropriate for the patient to be prescribed this medication.    Is the patient prescribed a combination of a benzodiazepine and opioid?  Yes, I feel it is clincially indicated to continue the medication and have discussed with the patient risks/benefits/alternatives.    Last Urine Drug Screen / ordered today: No  Recent Results (from the past 8760 hour(s))   OPIATE/OPIOID/BENZO PRESCRIPTION COMPLIANCE    Collection Time: 03/17/23  2:33 PM   Result Value Ref Range    DRUG SCREEN COMMENT URINE SEE BELOW     Creatine, Urine 101.1 mg/dL    Amphetamine Screen, Urine PRESUMPTIVE NEGATIVE NEGATIVE    Barbiturate Screen, Urine PRESUMPTIVE NEGATIVE NEGATIVE     Cannabinoid Screen, Urine PRESUMPTIVE NEGATIVE NEGATIVE    Cocaine Screen, Urine PRESUMPTIVE NEGATIVE NEGATIVE    PCP Screen, Urine PRESUMPTIVE NEGATIVE NEGATIVE    7-Aminoclonazepam <25 Cutoff <25 ng/mL    Alpha-Hydroxyalprazolam 240 (A) Cutoff <25 ng/mL    Alpha-Hydroxymidazolam <25 Cutoff <25 ng/mL    Alprazolam 107 (A) Cutoff <25 ng/mL    Chlordiazepoxide <25 Cutoff <25 ng/mL    Clonazepam <25 Cutoff <25 ng/mL    Diazepam <25 Cutoff <25 ng/mL    Lorazepam <25 Cutoff <25 ng/mL    Midazolam <25 Cutoff <25 ng/mL    Nordiazepam <25 Cutoff <25 ng/mL    Oxazepam <25 Cutoff <25 ng/mL    Temazepam <25 Cutoff <25 ng/mL    Zolpidem <25 Cutoff <25 ng/mL    Zolpidem Metabolite (ZCA) 137 (A) Cutoff <25 ng/mL    6-Acetylmorphine <25 Cutoff <25 ng/mL    Codeine <50 Cutoff <50 ng/mL    Hydrocodone <25 Cutoff <25 ng/mL    Hydromorphone <25 Cutoff <25 ng/mL    Morphine Urine <50 Cutoff <50 ng/mL    Norhydrocodone <25 Cutoff <25 ng/mL    Noroxycodone <25 Cutoff <25 ng/mL    Oxycodone <25 Cutoff <25 ng/mL    Oxymorphone <25 Cutoff <25 ng/mL    Tramadol >1000 (A) Cutoff <50 ng/mL    O-Desmethyltramadol >1000 (A) Cutoff <50 ng/mL    Fentanyl <2.5 Cutoff<2.5 ng/mL    Norfentanyl <2.5 Cutoff<2.5 ng/mL    METHADONE CONFIRMATION,URINE <25 Cutoff <25 ng/mL    EDDP <25 Cutoff <25 ng/mL     Results are as expected.     Controlled Substance Agreement:  Date of the Last Agreement: 3/17/23  I have reviewed each line item on the Controlled Substance Agreement including, but not limited to, the benefits, risks, and alternatives to treatment with a Controlled Substance medication(s). The patient has verbalized understanding and signed the agreement.    Opioids:  What is the patient's goal of therapy? Pain management  Is this being achieved with current treatment? yes    I have calculated the patient's Morphine Dose Equivalent (MED):   I have considered referral to Pain Management and/or a specialist, and do not feel it is necessary at this  time.    I feel that it is clinically indicated to continue this current medication regimen after consideration of alternative therapies, and other non-opioid treatment.    Opioid Risk Screening:  No data recorded    Pain Assessment:  Analgesia  What was your pain level on average during the past week?: 8  What was your pain level at its worst during the past week?: 8  What percentage of your pain has been relieved during the past week?: 50 %  Is the amount of pain relief you are now obtaining from your current pain relievers enough to make a real difference in your life?: N    Activities of Daily Living  Physical Functioning: Same  Family Relationships: Same  Social Relationships: Same  Mood: Same  Sleep Patterns: Same  Overall Functioning: Same     and Benzodiazepines:  What is the patient's goal of therapy? anxiety  Is this being achieved with current treatment? yes    SOLE-7:  Over the last 2 weeks, how often have you been bothered by any of the following problems?  Feeling nervous, anxious, or on edge: 1  Not being able to stop or control worryin  Worrying too much about different things: 1  Trouble relaxin  Being so restless that it is hard to sit still: 1  Becoming easily annoyed or irritable: 1  Feeling afraid as if something awful might happen: 0  SOLE-7 Total Score: 6        Activities of Daily Living:   Is your overall impression that this patient is benefiting (symptom reduction outweighs side effects) from benzodiazepine therapy? Yes     1. Physical Functioning: Same  2. Family Relationship: Same  3. Social Relationship: Same  4. Mood: Same  5. Sleep Patterns: Same  6. Overall Function: Same    Gen: Alert, NAD  Respiratory:  resp effort NL  Neuro:  coordination intact   Mood: normal

## 2023-10-27 ENCOUNTER — APPOINTMENT (OUTPATIENT)
Dept: CARDIOLOGY | Facility: CLINIC | Age: 78
End: 2023-10-27
Payer: MEDICARE

## 2023-10-27 ENCOUNTER — TELEPHONE (OUTPATIENT)
Dept: CARDIOLOGY | Facility: CLINIC | Age: 78
End: 2023-10-27

## 2023-10-27 DIAGNOSIS — R74.8 ELEVATED LIVER ENZYMES: ICD-10-CM

## 2023-10-27 NOTE — TELEPHONE ENCOUNTER
----- Message from Aaron Diggs MD sent at 10/27/2023  1:14 PM EDT -----  LFTs are mildly abnormal to the past clarify what medications particularly statins the patient is taking and if there is been any dose change in his compliance and then repeat the LFTs as part of his CMP in 4 to 6 weeks  ----- Message -----  From: Lab, Background User  Sent: 10/24/2023   8:27 PM EDT  To: Aaron Diggs MD

## 2023-10-27 NOTE — TELEPHONE ENCOUNTER
Called to patient. Discussed Dr. Diggs message. Pt is taking Rosuvastatin 20 mg EOD not daily. Medication list updated.  Lab req pended and to be done 4-6 weeks. Pt agreeable with plan.

## 2023-10-30 DIAGNOSIS — M47.9 SPINE DEGENERATION: ICD-10-CM

## 2023-10-30 DIAGNOSIS — F39 MOOD DISORDER (CMS-HCC): ICD-10-CM

## 2023-10-30 RX ORDER — DULOXETIN HYDROCHLORIDE 20 MG/1
20 CAPSULE, DELAYED RELEASE ORAL DAILY
Qty: 30 CAPSULE | Refills: 1 | Status: SHIPPED | OUTPATIENT
Start: 2023-10-30 | End: 2024-05-07 | Stop reason: HOSPADM

## 2023-10-30 RX ORDER — TRAMADOL HYDROCHLORIDE 50 MG/1
50 TABLET ORAL 2 TIMES DAILY PRN
Qty: 60 TABLET | Refills: 0 | Status: SHIPPED | OUTPATIENT
Start: 2023-10-30 | End: 2023-12-15 | Stop reason: SDUPTHER

## 2023-11-14 ENCOUNTER — OFFICE VISIT (OUTPATIENT)
Dept: CARDIOLOGY | Facility: CLINIC | Age: 78
End: 2023-11-14
Payer: MEDICARE

## 2023-11-14 ENCOUNTER — LAB (OUTPATIENT)
Dept: LAB | Facility: LAB | Age: 78
End: 2023-11-14
Payer: MEDICARE

## 2023-11-14 VITALS
DIASTOLIC BLOOD PRESSURE: 74 MMHG | BODY MASS INDEX: 22.98 KG/M2 | HEART RATE: 66 BPM | SYSTOLIC BLOOD PRESSURE: 120 MMHG | HEIGHT: 66 IN | WEIGHT: 143 LBS

## 2023-11-14 DIAGNOSIS — I48.0 PAROXYSMAL ATRIAL FIBRILLATION (MULTI): ICD-10-CM

## 2023-11-14 DIAGNOSIS — R06.02 SHORTNESS OF BREATH: ICD-10-CM

## 2023-11-14 DIAGNOSIS — E53.8 VITAMIN B12 DEFICIENCY: ICD-10-CM

## 2023-11-14 DIAGNOSIS — R00.2 PALPITATIONS: ICD-10-CM

## 2023-11-14 DIAGNOSIS — R42 DIZZINESS: ICD-10-CM

## 2023-11-14 DIAGNOSIS — I47.10 SVT (SUPRAVENTRICULAR TACHYCARDIA) (CMS-HCC): ICD-10-CM

## 2023-11-14 DIAGNOSIS — R74.8 ELEVATED LIVER ENZYMES: ICD-10-CM

## 2023-11-14 DIAGNOSIS — R26.81 UNSTEADY: ICD-10-CM

## 2023-11-14 DIAGNOSIS — G45.9 TIA (TRANSIENT ISCHEMIC ATTACK): ICD-10-CM

## 2023-11-14 LAB
ALBUMIN SERPL BCP-MCNC: 4.5 G/DL (ref 3.4–5)
ALP SERPL-CCNC: 78 U/L (ref 33–136)
ALT SERPL W P-5'-P-CCNC: 26 U/L (ref 7–45)
ANION GAP SERPL CALC-SCNC: 16 MMOL/L (ref 10–20)
AST SERPL W P-5'-P-CCNC: 30 U/L (ref 9–39)
BILIRUB DIRECT SERPL-MCNC: 0.1 MG/DL (ref 0–0.3)
BILIRUB SERPL-MCNC: 0.3 MG/DL (ref 0–1.2)
BNP SERPL-MCNC: 29 PG/ML (ref 0–99)
BUN SERPL-MCNC: 31 MG/DL (ref 6–23)
CALCIUM SERPL-MCNC: 10 MG/DL (ref 8.6–10.3)
CHLORIDE SERPL-SCNC: 103 MMOL/L (ref 98–107)
CO2 SERPL-SCNC: 30 MMOL/L (ref 21–32)
CREAT SERPL-MCNC: 1.28 MG/DL (ref 0.5–1.05)
ERYTHROCYTE [DISTWIDTH] IN BLOOD BY AUTOMATED COUNT: 13.9 % (ref 11.5–14.5)
GFR SERPL CREATININE-BSD FRML MDRD: 43 ML/MIN/1.73M*2
GLUCOSE SERPL-MCNC: 118 MG/DL (ref 74–99)
HCT VFR BLD AUTO: 44.5 % (ref 36–46)
HGB BLD-MCNC: 14.2 G/DL (ref 12–16)
MAGNESIUM SERPL-MCNC: 1.96 MG/DL (ref 1.6–2.4)
MCH RBC QN AUTO: 33.2 PG (ref 26–34)
MCHC RBC AUTO-ENTMCNC: 31.9 G/DL (ref 32–36)
MCV RBC AUTO: 104 FL (ref 80–100)
NRBC BLD-RTO: 0 /100 WBCS (ref 0–0)
PLATELET # BLD AUTO: 250 X10*3/UL (ref 150–450)
POTASSIUM SERPL-SCNC: 4.6 MMOL/L (ref 3.5–5.3)
PROT SERPL-MCNC: 7.3 G/DL (ref 6.4–8.2)
RBC # BLD AUTO: 4.28 X10*6/UL (ref 4–5.2)
SODIUM SERPL-SCNC: 144 MMOL/L (ref 136–145)
TSH SERPL-ACNC: 2.64 MIU/L (ref 0.44–3.98)
VIT B12 SERPL-MCNC: 751 PG/ML (ref 211–911)
WBC # BLD AUTO: 8.1 X10*3/UL (ref 4.4–11.3)

## 2023-11-14 PROCEDURE — 83880 ASSAY OF NATRIURETIC PEPTIDE: CPT

## 2023-11-14 PROCEDURE — 84443 ASSAY THYROID STIM HORMONE: CPT

## 2023-11-14 PROCEDURE — 3074F SYST BP LT 130 MM HG: CPT | Performed by: INTERNAL MEDICINE

## 2023-11-14 PROCEDURE — 99215 OFFICE O/P EST HI 40 MIN: CPT | Performed by: INTERNAL MEDICINE

## 2023-11-14 PROCEDURE — 3078F DIAST BP <80 MM HG: CPT | Performed by: INTERNAL MEDICINE

## 2023-11-14 PROCEDURE — 83735 ASSAY OF MAGNESIUM: CPT

## 2023-11-14 PROCEDURE — 1159F MED LIST DOCD IN RCRD: CPT | Performed by: INTERNAL MEDICINE

## 2023-11-14 PROCEDURE — 80053 COMPREHEN METABOLIC PANEL: CPT

## 2023-11-14 PROCEDURE — 85027 COMPLETE CBC AUTOMATED: CPT

## 2023-11-14 PROCEDURE — 82607 VITAMIN B-12: CPT

## 2023-11-14 PROCEDURE — 82248 BILIRUBIN DIRECT: CPT

## 2023-11-14 PROCEDURE — 1036F TOBACCO NON-USER: CPT | Performed by: INTERNAL MEDICINE

## 2023-11-14 PROCEDURE — 36415 COLL VENOUS BLD VENIPUNCTURE: CPT

## 2023-11-14 PROCEDURE — 1160F RVW MEDS BY RX/DR IN RCRD: CPT | Performed by: INTERNAL MEDICINE

## 2023-11-14 NOTE — PROGRESS NOTES
Patient:  Theodora Goel  YOB: 1945  MRN: [id number]       Impression/Plan:     Dizziness  Palpitations  Unsteady  SVT (supraventricular tachycardia)  Paroxysmal atrial fibrillation (CMS/HCC)  TIA (transient ischemic attack)    -she has history of both SVT and atrial fibrillation.  It is important to have a cardiac event monitor to determine exactly which or if both dysrhythmias are contributing to her symptoms.  I am also concerned given her symptoms that are very suggestive of TIA possibly small stroke that she has had A-fib and embolic phenomenon if A-fib is present and or if there are multiple embolic events on the MRI would institute full anticoagulation.  Also important to assure thyroid or other metabolic issues are not contributing to dysrhythmia and hence laboratory studies as described below  -     Holter Or Event Cardiac Monitor; Future  -     MR brain w and wo IV contrast; Future  -     Comprehensive Metabolic Panel; Future  -     Thyroid Stimulating Hormone; Future  -     Magnesium; Future    Shortness of breath             overall she is feeling poorly it may be all related to her dysrhythmias and potentially even a stroke depending on MRI results.  Her shortness of breath could be deconditioning but it is important to assure LV function remained stable we will obtain echocardiogram.  She has no evidence of severe coronary disease and does not describe angina.  As noted above lab work is to be obtained particularly important is to assure no anemia contributing to the dyspnea  -     Transthoracic Echo (TTE) Complete; Future      Chief Complaint/Active Symptoms:       Theodora Goel is a 77 y.o. female who presents with SVT, coronary disease based on calcium score 890, hyperlipidemia.  She had had a loop recorder placed for evaluation of SVT and prior atrial fibrillation and on a device check described March 23, 2020 had had episodes of atrial fibrillation.  1 episode lasting  for over 3 hours.  Over the subsequent year however burden was felt to be quite low and anticoagulation was not instituted.    Echocardiogram 6/2/2022 normal study  March 2021 Lexiscan perfusion study normal       Loop recorder assessments 1/24/2023: 112-second episode of tachycardia 1 to 2-second pauses.  One 6-second episode sinus rhythm 42            December 2022 20 episodes of tachycardia consistent with SVT 7-19 seconds in duration.    Has been having episodes of lightheadedness such that needs to lie down.  When lies down takes a while.  Lightheadness is severe and feels like may pass out. Perhaps 1-2X/week and more frequently last 2 months.  Has associated numbness of lips and shortness of breath. Though has epidsodes of fast heart rates but no associated dizziness.  If has sustained increase in heart rate will take an atenolol.    Two weeks with fall, not loss of consciousness.   Brother with aortic arch replacement.    Wants to feel healthier    Takes the valsartan every 10 days.  Will take it if >160.     Has a cold like feeling with breathing occasionally but no angina.    No edema.    TIA/possible  cva she had an episode where she became very dizzy unsteady fell down in her garage.  She did not go to the emergency department was week thereafter.    Review of Systems: Unremarkable except as noted above    Meds     Current Outpatient Medications   Medication Instructions    acetaminophen (TYLENOL) 1,000 mg, oral, 2 times daily    albuterol 90 mcg/actuation inhaler 2 puffs, inhalation, Every 4 hours PRN    ALPRAZolam (XANAX) 0.25 mg, oral, 2 times daily PRN    aspirin 81 mg, oral, Daily    atenolol (TENORMIN) 50 mg, oral, Daily    buPROPion XL (WELLBUTRIN XL) 150 mg, oral, 2 times daily    DULoxetine (CYMBALTA) 20 mg, oral, Daily, Swallow whole. Do not crush or chew.    ketoconazole (NIZOral) 2 % shampoo Apply once to face prn rash    levothyroxine (SYNTHROID, LEVOXYL) 125 mcg, oral, Daily    naloxone  (NARCAN) 4 mg, nasal, As needed, May repeat every 2-3 minutes if needed, alternating nostrils, until medical assistance becomes available.    Qvar RediHaler 40 mcg, inhalation, 2 times daily RT    rosuvastatin (Crestor) 20 mg tablet Take 1 tablet daily    traMADol (ULTRAM) 50 mg, oral, 2 times daily PRN    valsartan (DIOVAN) 160 mg, oral, Daily PRN    zolpidem (AMBIEN) 5 mg, oral, Nightly PRN        Allergies     Allergies   Allergen Reactions    Atorvastatin Other     myalgia         Annotated Problems     Specialty Problems          Cardiology Problems    Abnormal ECG    Coronary artery disease     Echocardiogram 6/2/2022 normal study  March 2021 Lexiscan perfusion study normal       coronary disease based on calcium score 890 CT 2020         VARGAS (dyspnea on exertion)    Essential hypertension    Hyperlipidemia    Palpitations    Paroxysmal atrial fibrillation (CMS/HCC)    Raynaud's phenomenon    Paroxysmal SVT (supraventricular tachycardia)        Problem List     Patient Active Problem List    Diagnosis Date Noted    Dizziness 11/14/2023    Unsteady 11/14/2023    TIA (transient ischemic attack) 11/14/2023    Paroxysmal SVT (supraventricular tachycardia) 11/14/2023    Medication management 07/20/2023    Long term (current) use of opiate analgesic 06/16/2023    Abnormal CBC 02/03/2023    Abnormal ECG 02/03/2023    Anxiety 02/03/2023    Asthma 02/03/2023    Atypical chest pain 02/03/2023    Atypical mole 02/03/2023    Balance problem 02/03/2023    Coronary artery disease 02/03/2023    Eczema 02/03/2023    Seborrheic dermatitis 02/03/2023    Essential hypertension 02/03/2023    Frequent falls 02/03/2023    Gallstones 02/03/2023    GERD (gastroesophageal reflux disease) 02/03/2023    Hyperlipidemia 02/03/2023    Hypomagnesemia 02/03/2023    Hypothyroidism, adult 02/03/2023    Insomnia 02/03/2023    Other dysphagia 02/03/2023    Osteopenia of left femoral neck 02/03/2023    Left lumbar radiculopathy 02/03/2023    Low  "back pain 02/03/2023    Low hemoglobin 02/03/2023    Magnesium deficiency 02/03/2023    Mood disorder (CMS/HCC) 02/03/2023    Palpitations 02/03/2023    Paroxysmal atrial fibrillation (CMS/[facility]) 02/03/2023    Postural dizziness 02/03/2023    Primary osteoarthritis of knees, bilateral 02/03/2023    Raynaud's phenomenon 02/03/2023    Recurrent major depressive disorder (CMS/[facility]) 02/03/2023    VARGAS (dyspnea on exertion) 02/03/2023    Shortness of breath 02/03/2023    Spine degeneration 02/03/2023    T11 vertebral fracture (CMS/[facility]) 02/03/2023    Tinnitus 02/03/2023    Vitamin B12 deficiency 02/03/2023    Vitamin D deficiency 02/03/2023    Weight loss 02/03/2023       Objective:     Vitals:    11/14/23 0831   BP: 120/74   BP Location: Left arm   Patient Position: Sitting   Pulse: 66   Weight: 64.9 kg (143 lb)   Height: 1.676 m (5' 6\")      Wt Readings from Last 4 Encounters:   11/14/23 64.9 kg (143 lb)   08/16/23 73 kg (161 lb)   07/20/23 70.8 kg (156 lb)   04/28/23 70.3 kg (155 lb)           LAB:     Lab Results   Component Value Date    WBC 8.1 11/14/2023    HGB 14.2 11/14/2023    HCT 44.5 11/14/2023     11/14/2023    CHOL 139 10/24/2023    TRIG 124 10/24/2023    HDL 87.9 10/24/2023    ALT 51 (H) 10/24/2023    AST 40 (H) 10/24/2023     08/16/2023    K 4.2 08/16/2023     08/16/2023    CREATININE 0.64 08/16/2023    BUN 15 08/16/2023    CO2 28 08/16/2023    TSH 4.99 (H) 08/16/2023    HGBA1C 4.6 08/16/2022       Diagnostic Studies:     Patient was never admitted.      Radiology:     Holter Or Event Cardiac Monitor    (Results Pending)   Transthoracic Echo (TTE) Complete    (Results Pending)   MR brain w and wo IV contrast    (Results Pending)       Physical Exam     General Appearance: alert and oriented to person, place and time, in no acute distress appears fatigued slightly anxious as well.  Cardiovascular: normal rate, regular rhythm, rare extrasystole, normal S1 and S2, no murmurs, rubs, clicks, or " gallops,  no JVD  Pulmonary/Chest: clear to auscultation bilaterally- no wheezes, rales or rhonchi, normal air movement, no respiratory distress  Abdomen: soft, non-tender, non-distended, normal bowel sounds, no masses   Extremities: no cyanosis, clubbing or edema  Skin: warm and dry, no rash or erythema  Eyes: EOMI  Neck: supple and non-tender without mass, no thyromegaly   Neurological: alert, oriented, normal speech, no focal findings or movement disorder noted  Vascular: 2+ pulses            Scribe Attestation  By signing my name below, IDulce LPN , Scribe   attest that this documentation has been prepared under the direction and in the presence of Eric Martins MD.

## 2023-11-15 ENCOUNTER — TELEPHONE (OUTPATIENT)
Dept: CARDIOLOGY | Facility: CLINIC | Age: 78
End: 2023-11-15
Payer: MEDICARE

## 2023-11-15 DIAGNOSIS — D64.9 LOW HEMOGLOBIN: ICD-10-CM

## 2023-11-15 NOTE — TELEPHONE ENCOUNTER
----- Message from Eric Martins MD sent at 11/14/2023  4:57 PM EST -----  Tell her thyroid and magnesium are normal.  Hemoglobin is normal.  There is a measurement called MCV on hemoglobin that is slightly increased and sometimes can represent abnormalities of B12 or folate which can sometimes cause fatigue if decreased.  Please obtain B12 level and folate level.  ----- Message -----  From: Lab, Background User  Sent: 11/14/2023  12:40 PM EST  To: Eric Martins MD

## 2023-11-17 ENCOUNTER — TELEPHONE (OUTPATIENT)
Dept: CARDIOLOGY | Facility: CLINIC | Age: 78
End: 2023-11-17
Payer: MEDICARE

## 2023-11-17 DIAGNOSIS — F39 MOOD DISORDER (CMS-HCC): ICD-10-CM

## 2023-11-17 RX ORDER — ALPRAZOLAM 0.25 MG/1
0.25 TABLET ORAL 2 TIMES DAILY PRN
Qty: 60 TABLET | Refills: 0 | Status: SHIPPED | OUTPATIENT
Start: 2023-11-17 | End: 2023-12-15 | Stop reason: SDUPTHER

## 2023-11-17 NOTE — TELEPHONE ENCOUNTER
Advised patient of message. Patient verbalized understanding. Mailed patient a copy of results. CPaRosaA

## 2023-11-17 NOTE — TELEPHONE ENCOUNTER
----- Message from Aaron Diggs MD sent at 11/17/2023  1:46 PM EST -----  Regarding: Recent lab results all stable similar okay provide copy to patient or through [facility]    ----- Message -----  From: Lab, Background User  Sent: 11/14/2023   3:50 PM EST  To: Aaron Diggs MD

## 2023-11-21 ENCOUNTER — TELEPHONE (OUTPATIENT)
Dept: PRIMARY CARE | Facility: CLINIC | Age: 78
End: 2023-11-21

## 2023-11-21 ENCOUNTER — APPOINTMENT (OUTPATIENT)
Dept: PRIMARY CARE | Facility: CLINIC | Age: 78
End: 2023-11-21
Payer: MEDICARE

## 2023-11-21 NOTE — TELEPHONE ENCOUNTER
This was one of the patients we were trying to reschedule until next Wednesday with DG, patient called today and left VM which Silvia called her back and patient stated she didn't feel good and wanted to reschedule her apt

## 2023-11-21 NOTE — TELEPHONE ENCOUNTER
Pt called in-says she received a message from us yesterday, saying she needs to reschedule her 1:40 appt today. I don't see an encounter for this, so I wanted to check with you and see if you need her to be rescheduled.     Thank you

## 2023-12-04 ENCOUNTER — OFFICE VISIT (OUTPATIENT)
Dept: PRIMARY CARE | Facility: CLINIC | Age: 78
End: 2023-12-04
Payer: MEDICARE

## 2023-12-04 VITALS
WEIGHT: 156.4 LBS | TEMPERATURE: 97 F | HEART RATE: 66 BPM | DIASTOLIC BLOOD PRESSURE: 63 MMHG | SYSTOLIC BLOOD PRESSURE: 129 MMHG | BODY MASS INDEX: 25.13 KG/M2 | OXYGEN SATURATION: 95 % | HEIGHT: 66 IN

## 2023-12-04 DIAGNOSIS — R79.89 LOW VITAMIN D LEVEL: ICD-10-CM

## 2023-12-04 DIAGNOSIS — E78.2 MIXED HYPERLIPIDEMIA: ICD-10-CM

## 2023-12-04 DIAGNOSIS — E55.9 VITAMIN D DEFICIENCY: ICD-10-CM

## 2023-12-04 DIAGNOSIS — M19.90 OSTEOARTHRITIS, UNSPECIFIED OSTEOARTHRITIS TYPE, UNSPECIFIED SITE: ICD-10-CM

## 2023-12-04 DIAGNOSIS — R42 LIGHTHEADED: ICD-10-CM

## 2023-12-04 DIAGNOSIS — R06.02 SHORTNESS OF BREATH: ICD-10-CM

## 2023-12-04 DIAGNOSIS — J45.909 ASTHMA, UNSPECIFIED ASTHMA SEVERITY, UNSPECIFIED WHETHER COMPLICATED, UNSPECIFIED WHETHER PERSISTENT (HHS-HCC): Primary | ICD-10-CM

## 2023-12-04 PROCEDURE — 1159F MED LIST DOCD IN RCRD: CPT | Performed by: INTERNAL MEDICINE

## 2023-12-04 PROCEDURE — 99214 OFFICE O/P EST MOD 30 MIN: CPT | Performed by: INTERNAL MEDICINE

## 2023-12-04 PROCEDURE — 3074F SYST BP LT 130 MM HG: CPT | Performed by: INTERNAL MEDICINE

## 2023-12-04 PROCEDURE — 3078F DIAST BP <80 MM HG: CPT | Performed by: INTERNAL MEDICINE

## 2023-12-04 PROCEDURE — 1160F RVW MEDS BY RX/DR IN RCRD: CPT | Performed by: INTERNAL MEDICINE

## 2023-12-04 PROCEDURE — 1036F TOBACCO NON-USER: CPT | Performed by: INTERNAL MEDICINE

## 2023-12-04 ASSESSMENT — ANXIETY QUESTIONNAIRES
5. BEING SO RESTLESS THAT IT IS HARD TO SIT STILL: NOT AT ALL
IF YOU CHECKED OFF ANY PROBLEMS ON THIS QUESTIONNAIRE, HOW DIFFICULT HAVE THESE PROBLEMS MADE IT FOR YOU TO DO YOUR WORK, TAKE CARE OF THINGS AT HOME, OR GET ALONG WITH OTHER PEOPLE: VERY DIFFICULT
7. FEELING AFRAID AS IF SOMETHING AWFUL MIGHT HAPPEN: NOT AT ALL
2. NOT BEING ABLE TO STOP OR CONTROL WORRYING: SEVERAL DAYS
4. TROUBLE RELAXING: MORE THAN HALF THE DAYS
GAD7 TOTAL SCORE: 6
1. FEELING NERVOUS, ANXIOUS, OR ON EDGE: SEVERAL DAYS
3. WORRYING TOO MUCH ABOUT DIFFERENT THINGS: MORE THAN HALF THE DAYS
6. BECOMING EASILY ANNOYED OR IRRITABLE: NOT AT ALL

## 2023-12-04 ASSESSMENT — PATIENT HEALTH QUESTIONNAIRE - PHQ9
SUM OF ALL RESPONSES TO PHQ9 QUESTIONS 1 AND 2: 0
2. FEELING DOWN, DEPRESSED OR HOPELESS: NOT AT ALL
1. LITTLE INTEREST OR PLEASURE IN DOING THINGS: NOT AT ALL

## 2023-12-04 NOTE — PROGRESS NOTES
Controlled Substance Visit:  I have personally reviewed the [place] report and have considered the risks of abuse, dependence, addiction and diversion and I believe that it is clinically appropriate for the patient to be prescribed this medication.     Last Urine Drug Screen / ordered today: No  Recent Results (from the past 8760 hour(s))   OPIATE/OPIOID/BENZO PRESCRIPTION COMPLIANCE    Collection Time: 03/17/23  2:33 PM   Result Value Ref Range    DRUG SCREEN COMMENT URINE SEE BELOW     Creatine, Urine 101.1 mg/dL    Amphetamine Screen, Urine PRESUMPTIVE NEGATIVE NEGATIVE    Barbiturate Screen, Urine PRESUMPTIVE NEGATIVE NEGATIVE    Cannabinoid Screen, Urine PRESUMPTIVE NEGATIVE NEGATIVE    Cocaine Screen, Urine PRESUMPTIVE NEGATIVE NEGATIVE    PCP Screen, Urine PRESUMPTIVE NEGATIVE NEGATIVE    7-Aminoclonazepam <25 Cutoff <25 ng/mL    Alpha-Hydroxyalprazolam 240 (A) Cutoff <25 ng/mL    Alpha-Hydroxymidazolam <25 Cutoff <25 ng/mL    Alprazolam 107 (A) Cutoff <25 ng/mL    Chlordiazepoxide <25 Cutoff <25 ng/mL    Clonazepam <25 Cutoff <25 ng/mL    Diazepam <25 Cutoff <25 ng/mL    Lorazepam <25 Cutoff <25 ng/mL    Midazolam <25 Cutoff <25 ng/mL    Nordiazepam <25 Cutoff <25 ng/mL    Oxazepam <25 Cutoff <25 ng/mL    Temazepam <25 Cutoff <25 ng/mL    Zolpidem <25 Cutoff <25 ng/mL    Zolpidem Metabolite (ZCA) 137 (A) Cutoff <25 ng/mL    6-Acetylmorphine <25 Cutoff <25 ng/mL    Codeine <50 Cutoff <50 ng/mL    Hydrocodone <25 Cutoff <25 ng/mL    Hydromorphone <25 Cutoff <25 ng/mL    Morphine Urine <50 Cutoff <50 ng/mL    Norhydrocodone <25 Cutoff <25 ng/mL    Noroxycodone <25 Cutoff <25 ng/mL    Oxycodone <25 Cutoff <25 ng/mL    Oxymorphone <25 Cutoff <25 ng/mL    Tramadol >1000 (A) Cutoff <50 ng/mL    O-Desmethyltramadol >1000 (A) Cutoff <50 ng/mL    Fentanyl <2.5 Cutoff<2.5 ng/mL    Norfentanyl <2.5 Cutoff<2.5 ng/mL    METHADONE CONFIRMATION,URINE <25 Cutoff <25 ng/mL    EDDP <25 Cutoff <25 ng/mL     Results are as  expected.     Controlled Substance Agreement:  Date of the Last Agreement: 3/17/23  I have reviewed each line item on the Controlled Substance Agreement including, but not limited to, the benefits, risks, and alternatives to treatment with a Controlled Substance medication(s). The patient has verbalized understanding and signed the agreement.    Opioids:  What is the patient's goal of therapy? Pain  Is this being achieved with current treatment? Yes    I have calculated the patient's Morphine Dose Equivalent (MED):   I have considered referral to Pain Management and/or a specialist, and do not feel it is necessary at this time.    I feel that it is clinically indicated to continue this current medication regimen after consideration of alternative therapies, and other non-opioid treatment.    Opioid Risk Screening:  No data recorded    Pain Assessment:  No data recorded, Benzodiazepines:  What is the patient's goal of therapy? Anxiety control  Takes bid no side effects, controls anxiety   Is this being achieved with current treatment? Yes  Pt states she takes tramadol 2 times per week for knee     SOLE-7:  No data recorded    Activities of Daily Living:   Is your overall impression that this patient is benefiting (symptom reduction outweighs side effects) from benzodiazepine therapy? Yes     1. Physical Functioning: Better  2. Family Relationship: Same  3. Social Relationship: Same  4. Mood: Same  5. Sleep Patterns: Same  6. Overall Function: Worse, and Sleep Aids:   What is the patient's goal of therapy? Sleep  Is this being achieved with current treatment? Yes    Activities of Daily Living:   Is your overall impression that this patient is benefiting (symptom reduction outweighs side effects) from sleep aid therapy? Yes     1. Physical Functioning: Same  2. Family Relationship: Same  3. Social Relationship: Same  4. Mood: Same  5. Sleep Patterns: Better  6. Overall Function: Better

## 2023-12-04 NOTE — PROGRESS NOTES
"Subjective   Patient ID: Theodora Goel is a 77 y.o. female who presents for primary care established.  HPI  Reports that overall she is not feeling well  Continues to have episodes of lightheadedness with nausea and shortness of breath (has had these for several months)  Happens multiple times/day most days, lasts for minutes to hours  Episodes are worse with activity and are becoming more frequent  Has never lost consciousness    Seen by Cardiology (Dr. Martins) for work-up of above, has Holter monitor, echo, and MRI scheduled    Also reports feels muffled hearing, \"cotton\" in ear, has tone when turns head to the left  Does have worsened hearing in this ear  No fevers  No drainage from ear    Fell ~10 days ago and injured R rib, does not hurt when breathing but does hurt with activity  Pt does not want an xray of ribs    Anxiety slightly worse recently - above medical issues worsen anxiety  Not having to use PRN Xanax more frequently  Sleep is OK    Review of Systems  Gen:  no fever  HEENT:  no trouble swallowing  CV:  no dyspnea, cyanosis  Lungs:  +SOB as above years, no change   GI:  no constipation, no blood in stool  Vascular:  no edema  Neuro:   no weakness  Skin:  no rash  MS:no joint swelling  Gu:  no urinary complaints  All other systems have been reviewed and are negative for complaint    /63   Pulse 66   Temp 36.1 °C (97 °F) (Temporal)   Ht 1.676 m (5' 6\")   Wt 70.9 kg (156 lb 6.4 oz)   SpO2 95%   BMI 25.24 kg/m²   Objective   Physical Exam  Lab Results   Component Value Date    WBC 8.1 11/14/2023    HGB 14.2 11/14/2023    HCT 44.5 11/14/2023     (H) 11/14/2023     11/14/2023     Lab Results   Component Value Date    GLUCOSE 118 (H) 11/14/2023    CALCIUM 10.0 11/14/2023     11/14/2023    K 4.6 11/14/2023    CO2 30 11/14/2023     11/14/2023    BUN 31 (H) 11/14/2023    CREATININE 1.28 (H) 11/14/2023     Social History     Socioeconomic History    Marital status: "      Spouse name: None    Number of children: None    Years of education: None    Highest education level: None   Occupational History    None   Tobacco Use    Smoking status: Former     Packs/day: 1     Types: Cigarettes     Quit date:      Years since quittin.9    Smokeless tobacco: Never   Substance and Sexual Activity    Alcohol use: Yes     Comment: rarely    Drug use: Never    Sexual activity: Defer   Other Topics Concern    None   Social History Narrative    None     Social Determinants of Health     Financial Resource Strain: Not on file   Food Insecurity: Not on file   Transportation Needs: Not on file   Physical Activity: Not on file   Stress: Not on file   Social Connections: Not on file   Intimate Partner Violence: Not on file   Housing Stability: Not on file     Family History   Problem Relation Name Age of Onset    Coronary artery disease Mother      Atrial fibrillation Mother      Hypertension Mother      Heart attack Mother      Stroke Mother      Coronary artery disease Father      Coronary artery disease Sister      Hypertension Sister      Colon cancer Paternal Grandfather  70       General:  Alert and in  NAD  Heent:  tms nl but cannot see r very well due to cerumen, throat clear.     Lungs, CTAB, no wheezes/crackles/rhonchi auscultated  Skin:  no suspicious lesions,  warm and dry  Head :  Normocephalic  Neck/thyroid:  neck supple, full rom, no cervical lymphadenopathy  no thyromegaly  Ears: Significant wax build-up noted in both canals, not able to visualize TM's b/l  Heart:  RRR, +2-3/6 systolic murmur auscultated loudest at RUSB  Abdomen:  Normal , bs present, soft, nontender, not distended, no masses palpated  Extremities:  No clubbing, cyanosis, or edema  Neurologic:  Nonfocal  Psych: alert, normal mood    Problem List Items Addressed This Visit             ICD-10-CM    Asthma - Primary J45.909    Relevant Orders    Hemoglobin A1C    Comprehensive Metabolic Panel    TSH with  reflex to Free T4 if abnormal    Vitamin D 25-Hydroxy,Total (for eval of Vitamin D levels)    Vitamin B12    Lipid Panel    CBC and Auto Differential    Sedimentation Rate    Referral to ENT    XR chest 2 views    Hyperlipidemia E78.5    Relevant Orders    Hemoglobin A1C    Comprehensive Metabolic Panel    TSH with reflex to Free T4 if abnormal    Vitamin D 25-Hydroxy,Total (for eval of Vitamin D levels)    Vitamin B12    Lipid Panel    CBC and Auto Differential    Sedimentation Rate    Referral to ENT    Shortness of breath R06.02    Relevant Orders    XR chest 2 views    Vitamin D deficiency E55.9    Relevant Orders    Vitamin D 25-Hydroxy,Total (for eval of Vitamin D levels)     Other Visit Diagnoses         Codes    Lightheaded     R42    Relevant Orders    Hemoglobin A1C    Comprehensive Metabolic Panel    TSH with reflex to Free T4 if abnormal    Vitamin D 25-Hydroxy,Total (for eval of Vitamin D levels)    Vitamin B12    Lipid Panel    CBC and Auto Differential    Sedimentation Rate    Referral to ENT    Referral to Neurology    Low vitamin D level     R79.89    Relevant Orders    Hemoglobin A1C    Osteoarthritis, unspecified osteoarthritis type, unspecified site     M19.90    left knee worst pain             Assessment/Plan:  Ms. Bryson Goel has been experiencing frequent near-syncopal episodes with lightheadedness, nausea/vomiting, and shortness of breath. Agree with excluding cardiac etiologies for near-syncope with TTE and event monitor as ordered by Dr. Martins, particularly given murmur auscultated on exam today.    Regarding tinnitus, will first need full assessment of b/l TM's with assistance of ENT colleagues given quantity of cerumen noted on exam today. Presentation could fit with Meniere's disease given reported lightheadedness, tinnitus, and hearing loss, though would rule out cardiac etiologies of near-syncope and middle ear effusion to explain tinnitus/hearing loss prior to considering this  diagnosis.    #Near-syncopal episodes  [ ] Cardiac work-up: TTE, event monitor,   [ ] Orthostatics weakly positive today, ->132, HR 67 -> 57 in context of beta-blocker use, encouraged hydration  [ ] RTC in 3 months    #Tinnitus  #Hearing loss  [ ] ENT, Neurology referrals for further work-up    Remainder of plan as below.    Theodora was seen today for primary care established.  Diagnoses and all orders for this visit:  Asthma, unspecified asthma severity, unspecified whether complicated, unspecified whether persistent (Primary)  -     Hemoglobin A1C; Future  -     Comprehensive Metabolic Panel; Future  -     TSH with reflex to Free T4 if abnormal; Future  -     Vitamin D 25-Hydroxy,Total (for eval of Vitamin D levels); Future  -     Vitamin B12; Future  -     Lipid Panel; Future  -     CBC and Auto Differential; Future  -     Sedimentation Rate; Future  -     Referral to ENT; Future  -     XR chest 2 views; Future  Mixed hyperlipidemia  -     Hemoglobin A1C; Future  -     Comprehensive Metabolic Panel; Future  -     TSH with reflex to Free T4 if abnormal; Future  -     Vitamin D 25-Hydroxy,Total (for eval of Vitamin D levels); Future  -     Vitamin B12; Future  -     Lipid Panel; Future  -     CBC and Auto Differential; Future  -     Sedimentation Rate; Future  -     Referral to ENT; Future  Lightheaded  -     Hemoglobin A1C; Future  -     Comprehensive Metabolic Panel; Future  -     TSH with reflex to Free T4 if abnormal; Future  -     Vitamin D 25-Hydroxy,Total (for eval of Vitamin D levels); Future  -     Vitamin B12; Future  -     Lipid Panel; Future  -     CBC and Auto Differential; Future  -     Sedimentation Rate; Future  -     Referral to ENT; Future  -     Referral to Neurology; Future  Low vitamin D level  -     Hemoglobin A1C; Future  Vitamin D deficiency  -     Vitamin D 25-Hydroxy,Total (for eval of Vitamin D levels); Future  Shortness of breath  -     XR chest 2 views; Future  Osteoarthritis,  unspecified osteoarthritis type, unspecified site  Comments:  left knee worst pain     Mireille Valdivia MD  Med-Peds PGY-4     I have reviewed the residents h and p and seen the patient as well.   I agree and have edited any necessary changes.

## 2023-12-15 DIAGNOSIS — I10 PRIMARY HYPERTENSION: ICD-10-CM

## 2023-12-15 DIAGNOSIS — F51.01 PRIMARY INSOMNIA: ICD-10-CM

## 2023-12-15 DIAGNOSIS — M47.9 SPINE DEGENERATION: ICD-10-CM

## 2023-12-15 DIAGNOSIS — F39 MOOD DISORDER (CMS-HCC): ICD-10-CM

## 2023-12-15 RX ORDER — VALSARTAN 160 MG/1
160 TABLET ORAL DAILY PRN
Qty: 90 TABLET | Refills: 2 | Status: SHIPPED | OUTPATIENT
Start: 2023-12-15 | End: 2024-05-07 | Stop reason: HOSPADM

## 2023-12-15 RX ORDER — TRAMADOL HYDROCHLORIDE 50 MG/1
50 TABLET ORAL 2 TIMES DAILY PRN
Qty: 60 TABLET | Refills: 0 | Status: SHIPPED | OUTPATIENT
Start: 2023-12-15 | End: 2024-02-13 | Stop reason: SDUPTHER

## 2023-12-15 RX ORDER — ATENOLOL 50 MG/1
50 TABLET ORAL DAILY
Qty: 90 TABLET | Refills: 3 | Status: SHIPPED | OUTPATIENT
Start: 2023-12-15 | End: 2024-05-07 | Stop reason: HOSPADM

## 2023-12-15 RX ORDER — ZOLPIDEM TARTRATE 5 MG/1
5 TABLET ORAL NIGHTLY PRN
Qty: 30 TABLET | Refills: 3 | Status: SHIPPED | OUTPATIENT
Start: 2023-12-15 | End: 2024-05-07 | Stop reason: HOSPADM

## 2023-12-15 RX ORDER — ALPRAZOLAM 0.25 MG/1
0.25 TABLET ORAL 2 TIMES DAILY PRN
Qty: 60 TABLET | Refills: 0 | Status: SHIPPED | OUTPATIENT
Start: 2023-12-15 | End: 2024-01-15 | Stop reason: SDUPTHER

## 2024-01-02 ENCOUNTER — APPOINTMENT (OUTPATIENT)
Dept: CARDIOLOGY | Facility: CLINIC | Age: 79
End: 2024-01-02
Payer: MEDICARE

## 2024-01-08 ENCOUNTER — ANCILLARY PROCEDURE (OUTPATIENT)
Dept: RADIOLOGY | Facility: CLINIC | Age: 79
End: 2024-01-08
Payer: MEDICARE

## 2024-01-08 DIAGNOSIS — J45.909 ASTHMA, UNSPECIFIED ASTHMA SEVERITY, UNSPECIFIED WHETHER COMPLICATED, UNSPECIFIED WHETHER PERSISTENT (HHS-HCC): ICD-10-CM

## 2024-01-08 DIAGNOSIS — R06.02 SHORTNESS OF BREATH: ICD-10-CM

## 2024-01-08 DIAGNOSIS — R42 DIZZINESS: ICD-10-CM

## 2024-01-08 DIAGNOSIS — R26.81 UNSTEADY: ICD-10-CM

## 2024-01-08 DIAGNOSIS — G45.9 TIA (TRANSIENT ISCHEMIC ATTACK): ICD-10-CM

## 2024-01-08 PROCEDURE — 71046 X-RAY EXAM CHEST 2 VIEWS: CPT | Performed by: RADIOLOGY

## 2024-01-08 PROCEDURE — 71046 X-RAY EXAM CHEST 2 VIEWS: CPT

## 2024-01-08 PROCEDURE — 70553 MRI BRAIN STEM W/O & W/DYE: CPT

## 2024-01-08 PROCEDURE — 70553 MRI BRAIN STEM W/O & W/DYE: CPT | Performed by: RADIOLOGY

## 2024-01-08 PROCEDURE — A9575 INJ GADOTERATE MEGLUMI 0.1ML: HCPCS | Performed by: INTERNAL MEDICINE

## 2024-01-08 PROCEDURE — 2550000001 HC RX 255 CONTRASTS: Performed by: INTERNAL MEDICINE

## 2024-01-08 RX ORDER — GADOTERATE MEGLUMINE 376.9 MG/ML
14 INJECTION INTRAVENOUS
Status: COMPLETED | OUTPATIENT
Start: 2024-01-08 | End: 2024-01-08

## 2024-01-08 RX ADMIN — GADOTERATE MEGLUMINE 14 ML: 376.9 INJECTION INTRAVENOUS at 15:28

## 2024-01-12 ENCOUNTER — APPOINTMENT (OUTPATIENT)
Dept: CARDIOLOGY | Facility: CLINIC | Age: 79
End: 2024-01-12
Payer: MEDICARE

## 2024-01-15 DIAGNOSIS — F39 MOOD DISORDER (CMS-HCC): ICD-10-CM

## 2024-01-15 RX ORDER — ALPRAZOLAM 0.25 MG/1
0.25 TABLET ORAL 2 TIMES DAILY PRN
Qty: 60 TABLET | Refills: 0 | Status: SHIPPED | OUTPATIENT
Start: 2024-01-15 | End: 2024-02-13 | Stop reason: SDUPTHER

## 2024-01-17 DIAGNOSIS — I25.84 CORONARY ARTERY CALCIFICATION: ICD-10-CM

## 2024-01-17 DIAGNOSIS — Z78.0 ASYMPTOMATIC MENOPAUSE: ICD-10-CM

## 2024-01-17 DIAGNOSIS — I25.10 CORONARY ARTERY CALCIFICATION: ICD-10-CM

## 2024-01-17 DIAGNOSIS — M47.9 SPINE DEGENERATION: ICD-10-CM

## 2024-01-17 DIAGNOSIS — S22.000A COMPRESSION FRACTURE OF BODY OF THORACIC VERTEBRA (MULTI): Primary | ICD-10-CM

## 2024-01-17 DIAGNOSIS — M54.9 BACK PAIN, UNSPECIFIED BACK LOCATION, UNSPECIFIED BACK PAIN LATERALITY, UNSPECIFIED CHRONICITY: ICD-10-CM

## 2024-01-17 DIAGNOSIS — M85.9 LOW BONE DENSITY: ICD-10-CM

## 2024-01-17 RX ORDER — ALENDRONATE SODIUM 70 MG/1
70 TABLET ORAL
Qty: 4 TABLET | Refills: 11 | Status: SHIPPED | OUTPATIENT
Start: 2024-01-17 | End: 2024-03-12 | Stop reason: SDUPTHER

## 2024-01-18 ENCOUNTER — ANCILLARY PROCEDURE (OUTPATIENT)
Dept: CARDIOLOGY | Facility: CLINIC | Age: 79
End: 2024-01-18
Payer: MEDICARE

## 2024-01-18 ENCOUNTER — LAB (OUTPATIENT)
Dept: LAB | Facility: LAB | Age: 79
End: 2024-01-18
Payer: MEDICARE

## 2024-01-18 DIAGNOSIS — E55.9 VITAMIN D DEFICIENCY: ICD-10-CM

## 2024-01-18 DIAGNOSIS — R42 LIGHTHEADED: ICD-10-CM

## 2024-01-18 DIAGNOSIS — R79.89 LOW VITAMIN D LEVEL: ICD-10-CM

## 2024-01-18 DIAGNOSIS — E78.2 MIXED HYPERLIPIDEMIA: ICD-10-CM

## 2024-01-18 DIAGNOSIS — G45.9 TIA (TRANSIENT ISCHEMIC ATTACK): ICD-10-CM

## 2024-01-18 DIAGNOSIS — R00.2 PALPITATIONS: ICD-10-CM

## 2024-01-18 DIAGNOSIS — I48.0 PAROXYSMAL ATRIAL FIBRILLATION (MULTI): ICD-10-CM

## 2024-01-18 DIAGNOSIS — R06.02 SHORTNESS OF BREATH: ICD-10-CM

## 2024-01-18 DIAGNOSIS — D64.9 LOW HEMOGLOBIN: ICD-10-CM

## 2024-01-18 DIAGNOSIS — J45.909 ASTHMA, UNSPECIFIED ASTHMA SEVERITY, UNSPECIFIED WHETHER COMPLICATED, UNSPECIFIED WHETHER PERSISTENT (HHS-HCC): ICD-10-CM

## 2024-01-18 DIAGNOSIS — I47.10 SVT (SUPRAVENTRICULAR TACHYCARDIA) (CMS-HCC): ICD-10-CM

## 2024-01-18 LAB
25(OH)D3 SERPL-MCNC: 31 NG/ML (ref 30–100)
ALBUMIN SERPL BCP-MCNC: 4.3 G/DL (ref 3.4–5)
ALP SERPL-CCNC: 70 U/L (ref 33–136)
ALT SERPL W P-5'-P-CCNC: 20 U/L (ref 7–45)
ANION GAP SERPL CALC-SCNC: 11 MMOL/L (ref 10–20)
AST SERPL W P-5'-P-CCNC: 22 U/L (ref 9–39)
BASOPHILS # BLD AUTO: 0.02 X10*3/UL (ref 0–0.1)
BASOPHILS NFR BLD AUTO: 0.3 %
BILIRUB SERPL-MCNC: 0.5 MG/DL (ref 0–1.2)
BUN SERPL-MCNC: 28 MG/DL (ref 6–23)
CALCIUM SERPL-MCNC: 9.6 MG/DL (ref 8.6–10.3)
CHLORIDE SERPL-SCNC: 107 MMOL/L (ref 98–107)
CHOLEST SERPL-MCNC: 159 MG/DL (ref 0–199)
CHOLESTEROL/HDL RATIO: 1.7
CO2 SERPL-SCNC: 30 MMOL/L (ref 21–32)
CREAT SERPL-MCNC: 0.79 MG/DL (ref 0.5–1.05)
EGFRCR SERPLBLD CKD-EPI 2021: 77 ML/MIN/1.73M*2
EOSINOPHIL # BLD AUTO: 0.18 X10*3/UL (ref 0–0.4)
EOSINOPHIL NFR BLD AUTO: 3 %
ERYTHROCYTE [DISTWIDTH] IN BLOOD BY AUTOMATED COUNT: 13.9 % (ref 11.5–14.5)
ERYTHROCYTE [SEDIMENTATION RATE] IN BLOOD BY WESTERGREN METHOD: 10 MM/H (ref 0–30)
EST. AVERAGE GLUCOSE BLD GHB EST-MCNC: 100 MG/DL
FOLATE SERPL-MCNC: >22.3 NG/ML
GLUCOSE SERPL-MCNC: 102 MG/DL (ref 74–99)
HBA1C MFR BLD: 5.1 %
HCT VFR BLD AUTO: 38.9 % (ref 36–46)
HDLC SERPL-MCNC: 92.2 MG/DL
HGB BLD-MCNC: 12.5 G/DL (ref 12–16)
IMM GRANULOCYTES # BLD AUTO: 0 X10*3/UL (ref 0–0.5)
IMM GRANULOCYTES NFR BLD AUTO: 0 % (ref 0–0.9)
LDLC SERPL CALC-MCNC: 49 MG/DL
LYMPHOCYTES # BLD AUTO: 1.93 X10*3/UL (ref 0.8–3)
LYMPHOCYTES NFR BLD AUTO: 32.1 %
MCH RBC QN AUTO: 33 PG (ref 26–34)
MCHC RBC AUTO-ENTMCNC: 32.1 G/DL (ref 32–36)
MCV RBC AUTO: 103 FL (ref 80–100)
MONOCYTES # BLD AUTO: 0.39 X10*3/UL (ref 0.05–0.8)
MONOCYTES NFR BLD AUTO: 6.5 %
NEUTROPHILS # BLD AUTO: 3.49 X10*3/UL (ref 1.6–5.5)
NEUTROPHILS NFR BLD AUTO: 58.1 %
NON HDL CHOLESTEROL: 67 MG/DL (ref 0–149)
NRBC BLD-RTO: 0 /100 WBCS (ref 0–0)
PLATELET # BLD AUTO: 205 X10*3/UL (ref 150–450)
POTASSIUM SERPL-SCNC: 4 MMOL/L (ref 3.5–5.3)
PROT SERPL-MCNC: 6.8 G/DL (ref 6.4–8.2)
RBC # BLD AUTO: 3.79 X10*6/UL (ref 4–5.2)
SODIUM SERPL-SCNC: 144 MMOL/L (ref 136–145)
T4 FREE SERPL-MCNC: 0.96 NG/DL (ref 0.61–1.12)
TRIGL SERPL-MCNC: 88 MG/DL (ref 0–149)
TSH SERPL-ACNC: 5.65 MIU/L (ref 0.44–3.98)
VIT B12 SERPL-MCNC: 285 PG/ML (ref 211–911)
VLDL: 18 MG/DL (ref 0–40)
WBC # BLD AUTO: 6 X10*3/UL (ref 4.4–11.3)

## 2024-01-18 PROCEDURE — 84439 ASSAY OF FREE THYROXINE: CPT

## 2024-01-18 PROCEDURE — 82306 VITAMIN D 25 HYDROXY: CPT

## 2024-01-18 PROCEDURE — 84443 ASSAY THYROID STIM HORMONE: CPT

## 2024-01-18 PROCEDURE — 85652 RBC SED RATE AUTOMATED: CPT

## 2024-01-18 PROCEDURE — 83036 HEMOGLOBIN GLYCOSYLATED A1C: CPT

## 2024-01-18 PROCEDURE — 82746 ASSAY OF FOLIC ACID SERUM: CPT

## 2024-01-18 PROCEDURE — 93306 TTE W/DOPPLER COMPLETE: CPT

## 2024-01-18 PROCEDURE — 36415 COLL VENOUS BLD VENIPUNCTURE: CPT

## 2024-01-18 PROCEDURE — 93272 ECG/REVIEW INTERPRET ONLY: CPT | Performed by: INTERNAL MEDICINE

## 2024-01-18 PROCEDURE — 93306 TTE W/DOPPLER COMPLETE: CPT | Performed by: INTERNAL MEDICINE

## 2024-01-18 PROCEDURE — 85025 COMPLETE CBC W/AUTO DIFF WBC: CPT

## 2024-01-18 PROCEDURE — 80053 COMPREHEN METABOLIC PANEL: CPT

## 2024-01-18 PROCEDURE — 80061 LIPID PANEL: CPT

## 2024-01-18 PROCEDURE — 82607 VITAMIN B-12: CPT

## 2024-01-19 LAB
AORTIC VALVE MEAN GRADIENT: 8 MMHG
AORTIC VALVE PEAK VELOCITY: 1.95 M/S
AV PEAK GRADIENT: 15.2 MMHG
AVA (PEAK VEL): 2.13 CM2
AVA (VTI): 2.1 CM2
EJECTION FRACTION APICAL 4 CHAMBER: 75.8
EJECTION FRACTION: 75 %
LEFT VENTRICLE INTERNAL DIMENSION DIASTOLE: 3.21 CM (ref 3.5–6)
LEFT VENTRICULAR OUTFLOW TRACT DIAMETER: 2 CM
MITRAL VALVE E/A RATIO: 0.71
MITRAL VALVE E/E' RATIO: 1.49
RIGHT VENTRICLE PEAK SYSTOLIC PRESSURE: 29.2 MMHG

## 2024-01-30 ENCOUNTER — APPOINTMENT (OUTPATIENT)
Dept: CARDIOLOGY | Facility: CLINIC | Age: 79
End: 2024-01-30
Payer: MEDICARE

## 2024-02-05 ENCOUNTER — APPOINTMENT (OUTPATIENT)
Dept: RADIOLOGY | Facility: CLINIC | Age: 79
End: 2024-02-05
Payer: MEDICARE

## 2024-02-13 ENCOUNTER — APPOINTMENT (OUTPATIENT)
Dept: RADIOLOGY | Facility: CLINIC | Age: 79
End: 2024-02-13
Payer: MEDICARE

## 2024-02-13 DIAGNOSIS — M47.9 SPINE DEGENERATION: ICD-10-CM

## 2024-02-13 DIAGNOSIS — F39 MOOD DISORDER (CMS-HCC): ICD-10-CM

## 2024-02-13 RX ORDER — ALPRAZOLAM 0.25 MG/1
0.25 TABLET ORAL 2 TIMES DAILY PRN
Qty: 60 TABLET | Refills: 0 | Status: SHIPPED | OUTPATIENT
Start: 2024-02-13 | End: 2024-03-12 | Stop reason: SDUPTHER

## 2024-02-13 RX ORDER — TRAMADOL HYDROCHLORIDE 50 MG/1
50 TABLET ORAL 2 TIMES DAILY PRN
Qty: 60 TABLET | Refills: 0 | Status: SHIPPED | OUTPATIENT
Start: 2024-02-13 | End: 2024-05-07 | Stop reason: HOSPADM

## 2024-02-15 ENCOUNTER — APPOINTMENT (OUTPATIENT)
Dept: CARDIOLOGY | Facility: CLINIC | Age: 79
End: 2024-02-15
Payer: MEDICARE

## 2024-02-29 ENCOUNTER — APPOINTMENT (OUTPATIENT)
Dept: RADIOLOGY | Facility: CLINIC | Age: 79
End: 2024-02-29
Payer: MEDICARE

## 2024-03-04 ENCOUNTER — APPOINTMENT (OUTPATIENT)
Dept: PRIMARY CARE | Facility: CLINIC | Age: 79
End: 2024-03-04
Payer: MEDICARE

## 2024-03-12 ENCOUNTER — APPOINTMENT (OUTPATIENT)
Dept: CARDIOLOGY | Facility: CLINIC | Age: 79
End: 2024-03-12
Payer: MEDICARE

## 2024-03-12 DIAGNOSIS — F39 MOOD DISORDER (CMS-HCC): ICD-10-CM

## 2024-03-12 DIAGNOSIS — S22.000A COMPRESSION FRACTURE OF BODY OF THORACIC VERTEBRA (MULTI): ICD-10-CM

## 2024-03-12 RX ORDER — ALENDRONATE SODIUM 70 MG/1
70 TABLET ORAL
Qty: 4 TABLET | Refills: 11 | Status: SHIPPED | OUTPATIENT
Start: 2024-03-12 | End: 2025-03-12

## 2024-03-12 RX ORDER — ALPRAZOLAM 0.25 MG/1
0.25 TABLET ORAL 2 TIMES DAILY PRN
Qty: 60 TABLET | Refills: 0 | Status: SHIPPED | OUTPATIENT
Start: 2024-03-12 | End: 2024-05-07 | Stop reason: HOSPADM

## 2024-03-14 ENCOUNTER — APPOINTMENT (OUTPATIENT)
Dept: RADIOLOGY | Facility: CLINIC | Age: 79
End: 2024-03-14
Payer: MEDICARE

## 2024-03-20 ENCOUNTER — APPOINTMENT (OUTPATIENT)
Dept: PRIMARY CARE | Facility: CLINIC | Age: 79
End: 2024-03-20
Payer: MEDICARE

## 2024-03-28 ENCOUNTER — APPOINTMENT (OUTPATIENT)
Dept: RADIOLOGY | Facility: CLINIC | Age: 79
End: 2024-03-28
Payer: MEDICARE

## 2024-04-09 ENCOUNTER — APPOINTMENT (OUTPATIENT)
Dept: CARDIOLOGY | Facility: CLINIC | Age: 79
End: 2024-04-09
Payer: MEDICARE

## 2024-04-15 ENCOUNTER — APPOINTMENT (OUTPATIENT)
Dept: PRIMARY CARE | Facility: CLINIC | Age: 79
End: 2024-04-15
Payer: MEDICARE

## 2024-04-16 ENCOUNTER — TELEPHONE (OUTPATIENT)
Dept: PRIMARY CARE | Facility: CLINIC | Age: 79
End: 2024-04-16
Payer: MEDICARE

## 2024-04-16 NOTE — TELEPHONE ENCOUNTER
Balbina from [facility] called in regarding Pt. Pt being discharged today and they are inquiring if you could follow Pt for home health care . To be able to sign orders for her.       Please advise

## 2024-04-16 NOTE — TELEPHONE ENCOUNTER
I called irlanda back and relayed. Fax number for office given to her so she can fax paperwork over.

## 2024-04-17 ENCOUNTER — PATIENT OUTREACH (OUTPATIENT)
Dept: PRIMARY CARE | Facility: CLINIC | Age: 79
End: 2024-04-17
Payer: MEDICARE

## 2024-04-17 ENCOUNTER — DOCUMENTATION (OUTPATIENT)
Dept: PRIMARY CARE | Facility: CLINIC | Age: 79
End: 2024-04-17
Payer: MEDICARE

## 2024-04-17 PROBLEM — I95.1 ORTHOSTATIC HYPOTENSION: Status: ACTIVE | Noted: 2024-04-15

## 2024-04-17 NOTE — TELEPHONE ENCOUNTER
[facility] home health care order received via fax   Order for PT and occupational therapy   Fax # [phone]  Placed in pcp office for signature

## 2024-04-17 NOTE — PROGRESS NOTES
Discharge Facility: [facility]  Discharge Diagnosis: Fall  Admission Date: 4/14/24  Discharge Date: 4/16/24    PCP Appointment Date: None avail. Task to office  Specialist Appointment Date:  Hospital Encounter and Summary: Linked o    See discharge assessment below for further details    Engagement  Call Start Time: 1336 (4/17/2024  1:38 PM)    Medications  Medications reviewed with patient/caregiver?: Yes (4/17/2024  1:38 PM)  Is the patient having any side effects they believe may be caused by any medication additions or changes?: No (4/17/2024  1:38 PM)  Does the patient have all medications ordered at discharge?: Yes (4/17/2024  1:38 PM)  Prescription Comments: pt sent home with script (4/17/2024  1:38 PM)  Is the patient taking all medications as directed (includes completed medication regime)?: Yes (4/17/2024  1:38 PM)  Care Management Interventions: Provided patient education (4/17/2024  1:38 PM)  Medication Comments: Pt denies problems obtaining or affording medication (4/17/2024  1:38 PM)    Appointments  Does the patient have a primary care provider?: Yes (no avail appt. task to office) (4/17/2024  1:38 PM)  Has the patient kept scheduled appointments due by today?: Yes (4/17/2024  1:38 PM)    Self Management  What is the home health agency?: Home Health Care (4/17/2024  1:38 PM)  Has home health visited the patient within 72 hours of discharge?: No (4/17/2024  1:38 PM)    Patient Teaching  Does the patient have access to their discharge instructions?: Yes (4/17/2024  1:38 PM)  Care Management Interventions: Reviewed instructions with patient (4/17/2024  1:38 PM)  What is the patient's perception of their health status since discharge?: Improving (4/17/2024  1:38 PM)  Is the patient/caregiver able to teach back the hierarchy of who to call/visit for symptoms/problems? PCP, Specialist, Home Health nurse, Urgent Care, ED, 911: Yes (4/17/2024  1:38 PM)    Wrap Up  Wrap Up Additional Comments: This CM spoke  with pt via phone. Pt reports doing well at home since discharge. New meds reviewed. Pt denies CP and SOB. Pt aware of my availability for non-emergent concerns. Contact info provided to patient (4/17/2024  1:38 PM)      Jose C Haynes LPN

## 2024-04-29 ENCOUNTER — TELEPHONE (OUTPATIENT)
Dept: PRIMARY CARE | Facility: CLINIC | Age: 79
End: 2024-04-29
Payer: MEDICARE

## 2024-04-29 NOTE — TELEPHONE ENCOUNTER
Type of form: verbal order  Received from: simin via fax for completion and provider's signature   Fax to [phone]  Form placed in PCP box front office.

## 2024-05-01 DIAGNOSIS — F33.9 RECURRENT MAJOR DEPRESSIVE DISORDER, REMISSION STATUS UNSPECIFIED (CMS-HCC): Primary | ICD-10-CM

## 2024-05-01 DIAGNOSIS — I47.10 PAROXYSMAL SVT (SUPRAVENTRICULAR TACHYCARDIA) (CMS-HCC): ICD-10-CM

## 2024-05-01 DIAGNOSIS — R29.6 FREQUENT FALLS: ICD-10-CM

## 2024-05-02 ENCOUNTER — APPOINTMENT (OUTPATIENT)
Dept: RADIOLOGY | Facility: HOSPITAL | Age: 79
DRG: 689 | End: 2024-05-02
Payer: MEDICARE

## 2024-05-02 ENCOUNTER — APPOINTMENT (OUTPATIENT)
Dept: CARDIOLOGY | Facility: HOSPITAL | Age: 79
DRG: 689 | End: 2024-05-02
Payer: MEDICARE

## 2024-05-02 ENCOUNTER — HOSPITAL ENCOUNTER (INPATIENT)
Facility: HOSPITAL | Age: 79
LOS: 2 days | Discharge: NURSING, REHAB OR LONG-TERM CARE | DRG: 689 | End: 2024-05-07
Attending: STUDENT IN AN ORGANIZED HEALTH CARE EDUCATION/TRAINING PROGRAM | Admitting: STUDENT IN AN ORGANIZED HEALTH CARE EDUCATION/TRAINING PROGRAM
Payer: MEDICARE

## 2024-05-02 DIAGNOSIS — R22.1 LOCALIZED SWELLING, MASS AND LUMP, NECK: ICD-10-CM

## 2024-05-02 DIAGNOSIS — F51.02 ADJUSTMENT INSOMNIA: ICD-10-CM

## 2024-05-02 DIAGNOSIS — I47.10 PAROXYSMAL SVT (SUPRAVENTRICULAR TACHYCARDIA) (CMS-HCC): ICD-10-CM

## 2024-05-02 DIAGNOSIS — I10 ESSENTIAL HYPERTENSION: ICD-10-CM

## 2024-05-02 DIAGNOSIS — F41.9 ANXIETY: ICD-10-CM

## 2024-05-02 DIAGNOSIS — N30.00 ACUTE CYSTITIS WITHOUT HEMATURIA: ICD-10-CM

## 2024-05-02 DIAGNOSIS — R53.1 GENERALIZED WEAKNESS: Primary | ICD-10-CM

## 2024-05-02 DIAGNOSIS — M79.89 LEFT LEG SWELLING: ICD-10-CM

## 2024-05-02 DIAGNOSIS — R41.82 ALTERED MENTAL STATUS, UNSPECIFIED ALTERED MENTAL STATUS TYPE: ICD-10-CM

## 2024-05-02 PROBLEM — R32 URINARY INCONTINENCE: Status: ACTIVE | Noted: 2024-05-02

## 2024-05-02 LAB
ALBUMIN SERPL BCP-MCNC: 3.9 G/DL (ref 3.4–5)
ALP SERPL-CCNC: 67 U/L (ref 33–136)
ALT SERPL W P-5'-P-CCNC: 21 U/L (ref 7–45)
AMMONIA PLAS-SCNC: 11 UMOL/L (ref 16–53)
ANION GAP SERPL CALC-SCNC: 11 MMOL/L (ref 10–20)
AST SERPL W P-5'-P-CCNC: 27 U/L (ref 9–39)
BASOPHILS # BLD AUTO: 0.01 X10*3/UL (ref 0–0.1)
BASOPHILS NFR BLD AUTO: 0.3 %
BILIRUB SERPL-MCNC: 0.3 MG/DL (ref 0–1.2)
BUN SERPL-MCNC: 26 MG/DL (ref 6–23)
CALCIUM SERPL-MCNC: 9.3 MG/DL (ref 8.6–10.3)
CARDIAC TROPONIN I PNL SERPL HS: 5 NG/L (ref 0–13)
CHLORIDE SERPL-SCNC: 108 MMOL/L (ref 98–107)
CK SERPL-CCNC: 25 U/L (ref 0–215)
CO2 SERPL-SCNC: 27 MMOL/L (ref 21–32)
CREAT SERPL-MCNC: 0.62 MG/DL (ref 0.5–1.05)
EGFRCR SERPLBLD CKD-EPI 2021: >90 ML/MIN/1.73M*2
EOSINOPHIL # BLD AUTO: 0.1 X10*3/UL (ref 0–0.4)
EOSINOPHIL NFR BLD AUTO: 2.6 %
ERYTHROCYTE [DISTWIDTH] IN BLOOD BY AUTOMATED COUNT: 13 % (ref 11.5–14.5)
FLUAV RNA RESP QL NAA+PROBE: NOT DETECTED
FLUBV RNA RESP QL NAA+PROBE: NOT DETECTED
GLUCOSE SERPL-MCNC: 89 MG/DL (ref 74–99)
HCT VFR BLD AUTO: 37 % (ref 36–46)
HGB BLD-MCNC: 11.8 G/DL (ref 12–16)
IMM GRANULOCYTES # BLD AUTO: 0.01 X10*3/UL (ref 0–0.5)
IMM GRANULOCYTES NFR BLD AUTO: 0.3 % (ref 0–0.9)
INR PPP: 1 (ref 0.9–1.1)
LYMPHOCYTES # BLD AUTO: 1.43 X10*3/UL (ref 0.8–3)
LYMPHOCYTES NFR BLD AUTO: 36.7 %
MCH RBC QN AUTO: 32.3 PG (ref 26–34)
MCHC RBC AUTO-ENTMCNC: 31.9 G/DL (ref 32–36)
MCV RBC AUTO: 101 FL (ref 80–100)
MONOCYTES # BLD AUTO: 0.39 X10*3/UL (ref 0.05–0.8)
MONOCYTES NFR BLD AUTO: 10 %
NEUTROPHILS # BLD AUTO: 1.96 X10*3/UL (ref 1.6–5.5)
NEUTROPHILS NFR BLD AUTO: 50.1 %
NRBC BLD-RTO: 0 /100 WBCS (ref 0–0)
PLATELET # BLD AUTO: 201 X10*3/UL (ref 150–450)
POTASSIUM SERPL-SCNC: 3.6 MMOL/L (ref 3.5–5.3)
PROT SERPL-MCNC: 6.6 G/DL (ref 6.4–8.2)
PROTHROMBIN TIME: 10.8 SECONDS (ref 9.8–12.8)
RBC # BLD AUTO: 3.65 X10*6/UL (ref 4–5.2)
SARS-COV-2 RNA RESP QL NAA+PROBE: NOT DETECTED
SODIUM SERPL-SCNC: 142 MMOL/L (ref 136–145)
T4 FREE SERPL-MCNC: 0.76 NG/DL (ref 0.61–1.12)
TSH SERPL-ACNC: 5.42 MIU/L (ref 0.44–3.98)
WBC # BLD AUTO: 3.9 X10*3/UL (ref 4.4–11.3)

## 2024-05-02 PROCEDURE — 99285 EMERGENCY DEPT VISIT HI MDM: CPT | Mod: 25

## 2024-05-02 PROCEDURE — 82550 ASSAY OF CK (CPK): CPT

## 2024-05-02 PROCEDURE — 96360 HYDRATION IV INFUSION INIT: CPT

## 2024-05-02 PROCEDURE — 99223 1ST HOSP IP/OBS HIGH 75: CPT | Performed by: INTERNAL MEDICINE

## 2024-05-02 PROCEDURE — 84439 ASSAY OF FREE THYROXINE: CPT

## 2024-05-02 PROCEDURE — G0378 HOSPITAL OBSERVATION PER HR: HCPCS

## 2024-05-02 PROCEDURE — 82746 ASSAY OF FOLIC ACID SERUM: CPT | Mod: STJLAB

## 2024-05-02 PROCEDURE — 84443 ASSAY THYROID STIM HORMONE: CPT

## 2024-05-02 PROCEDURE — 70450 CT HEAD/BRAIN W/O DYE: CPT | Performed by: STUDENT IN AN ORGANIZED HEALTH CARE EDUCATION/TRAINING PROGRAM

## 2024-05-02 PROCEDURE — 82140 ASSAY OF AMMONIA: CPT

## 2024-05-02 PROCEDURE — 93010 ELECTROCARDIOGRAM REPORT: CPT | Performed by: STUDENT IN AN ORGANIZED HEALTH CARE EDUCATION/TRAINING PROGRAM

## 2024-05-02 PROCEDURE — 84484 ASSAY OF TROPONIN QUANT: CPT

## 2024-05-02 PROCEDURE — 70450 CT HEAD/BRAIN W/O DYE: CPT

## 2024-05-02 PROCEDURE — 80053 COMPREHEN METABOLIC PANEL: CPT

## 2024-05-02 PROCEDURE — 99285 EMERGENCY DEPT VISIT HI MDM: CPT | Performed by: STUDENT IN AN ORGANIZED HEALTH CARE EDUCATION/TRAINING PROGRAM

## 2024-05-02 PROCEDURE — 85025 COMPLETE CBC W/AUTO DIFF WBC: CPT

## 2024-05-02 PROCEDURE — 87636 SARSCOV2 & INF A&B AMP PRB: CPT

## 2024-05-02 PROCEDURE — 2500000004 HC RX 250 GENERAL PHARMACY W/ HCPCS (ALT 636 FOR OP/ED)

## 2024-05-02 PROCEDURE — 87502 INFLUENZA DNA AMP PROBE: CPT

## 2024-05-02 PROCEDURE — 71045 X-RAY EXAM CHEST 1 VIEW: CPT

## 2024-05-02 PROCEDURE — 85610 PROTHROMBIN TIME: CPT

## 2024-05-02 PROCEDURE — 93005 ELECTROCARDIOGRAM TRACING: CPT

## 2024-05-02 PROCEDURE — 71045 X-RAY EXAM CHEST 1 VIEW: CPT | Mod: FOREIGN READ | Performed by: RADIOLOGY

## 2024-05-02 PROCEDURE — 2500000004 HC RX 250 GENERAL PHARMACY W/ HCPCS (ALT 636 FOR OP/ED): Performed by: INTERNAL MEDICINE

## 2024-05-02 PROCEDURE — 36415 COLL VENOUS BLD VENIPUNCTURE: CPT

## 2024-05-02 RX ORDER — METOCLOPRAMIDE HYDROCHLORIDE 5 MG/ML
10 INJECTION INTRAMUSCULAR; INTRAVENOUS EVERY 6 HOURS PRN
Status: DISCONTINUED | OUTPATIENT
Start: 2024-05-02 | End: 2024-05-03

## 2024-05-02 RX ORDER — ACETAMINOPHEN 650 MG/1
650 SUPPOSITORY RECTAL EVERY 4 HOURS PRN
Status: DISCONTINUED | OUTPATIENT
Start: 2024-05-02 | End: 2024-05-04

## 2024-05-02 RX ORDER — SODIUM CHLORIDE 9 MG/ML
75 INJECTION, SOLUTION INTRAVENOUS CONTINUOUS
Status: ACTIVE | OUTPATIENT
Start: 2024-05-02 | End: 2024-05-03

## 2024-05-02 RX ORDER — TALC
3 POWDER (GRAM) TOPICAL NIGHTLY PRN
Status: DISCONTINUED | OUTPATIENT
Start: 2024-05-02 | End: 2024-05-04

## 2024-05-02 RX ORDER — PANTOPRAZOLE SODIUM 40 MG/1
40 TABLET, DELAYED RELEASE ORAL
Status: DISCONTINUED | OUTPATIENT
Start: 2024-05-03 | End: 2024-05-08 | Stop reason: HOSPADM

## 2024-05-02 RX ORDER — ONDANSETRON 4 MG/1
4 TABLET, ORALLY DISINTEGRATING ORAL EVERY 8 HOURS PRN
Status: DISCONTINUED | OUTPATIENT
Start: 2024-05-02 | End: 2024-05-08 | Stop reason: HOSPADM

## 2024-05-02 RX ORDER — PANTOPRAZOLE SODIUM 40 MG/10ML
40 INJECTION, POWDER, LYOPHILIZED, FOR SOLUTION INTRAVENOUS
Status: DISCONTINUED | OUTPATIENT
Start: 2024-05-03 | End: 2024-05-08 | Stop reason: HOSPADM

## 2024-05-02 RX ORDER — METOCLOPRAMIDE 10 MG/1
10 TABLET ORAL EVERY 6 HOURS PRN
Status: DISCONTINUED | OUTPATIENT
Start: 2024-05-02 | End: 2024-05-03

## 2024-05-02 RX ORDER — ACETAMINOPHEN 160 MG/5ML
650 SOLUTION ORAL EVERY 4 HOURS PRN
Status: DISCONTINUED | OUTPATIENT
Start: 2024-05-02 | End: 2024-05-04

## 2024-05-02 RX ORDER — HALOPERIDOL 5 MG/ML
5 INJECTION INTRAMUSCULAR ONCE
Status: COMPLETED | OUTPATIENT
Start: 2024-05-02 | End: 2024-05-03

## 2024-05-02 RX ORDER — ACETAMINOPHEN 325 MG/1
650 TABLET ORAL EVERY 4 HOURS PRN
Status: DISCONTINUED | OUTPATIENT
Start: 2024-05-02 | End: 2024-05-08 | Stop reason: HOSPADM

## 2024-05-02 RX ORDER — ACETAMINOPHEN 325 MG/1
650 TABLET ORAL EVERY 4 HOURS PRN
Status: DISCONTINUED | OUTPATIENT
Start: 2024-05-02 | End: 2024-05-04

## 2024-05-02 RX ORDER — ONDANSETRON HYDROCHLORIDE 2 MG/ML
4 INJECTION, SOLUTION INTRAVENOUS EVERY 8 HOURS PRN
Status: DISCONTINUED | OUTPATIENT
Start: 2024-05-02 | End: 2024-05-08 | Stop reason: HOSPADM

## 2024-05-02 RX ORDER — POLYETHYLENE GLYCOL 3350 17 G/17G
17 POWDER, FOR SOLUTION ORAL DAILY PRN
Status: DISCONTINUED | OUTPATIENT
Start: 2024-05-02 | End: 2024-05-08 | Stop reason: HOSPADM

## 2024-05-02 RX ORDER — VALSARTAN 80 MG/1
80 TABLET ORAL DAILY PRN
COMMUNITY
End: 2024-05-07 | Stop reason: HOSPADM

## 2024-05-02 RX ADMIN — SODIUM CHLORIDE 75 ML/HR: 9 INJECTION, SOLUTION INTRAVENOUS at 23:38

## 2024-05-02 RX ADMIN — SODIUM CHLORIDE, POTASSIUM CHLORIDE, SODIUM LACTATE AND CALCIUM CHLORIDE 1000 ML: 600; 310; 30; 20 INJECTION, SOLUTION INTRAVENOUS at 18:28

## 2024-05-02 SDOH — SOCIAL STABILITY: SOCIAL INSECURITY: ARE YOU OR HAVE YOU BEEN THREATENED OR ABUSED PHYSICALLY, EMOTIONALLY, OR SEXUALLY BY ANYONE?: NO

## 2024-05-02 SDOH — SOCIAL STABILITY: SOCIAL INSECURITY: WERE YOU ABLE TO COMPLETE ALL THE BEHAVIORAL HEALTH SCREENINGS?: YES

## 2024-05-02 SDOH — SOCIAL STABILITY: SOCIAL INSECURITY: ARE THERE ANY APPARENT SIGNS OF INJURIES/BEHAVIORS THAT COULD BE RELATED TO ABUSE/NEGLECT?: NO

## 2024-05-02 SDOH — SOCIAL STABILITY: SOCIAL INSECURITY: DO YOU FEEL ANYONE HAS EXPLOITED OR TAKEN ADVANTAGE OF YOU FINANCIALLY OR OF YOUR PERSONAL PROPERTY?: NO

## 2024-05-02 SDOH — SOCIAL STABILITY: SOCIAL INSECURITY: ABUSE: ADULT

## 2024-05-02 SDOH — SOCIAL STABILITY: SOCIAL INSECURITY: HAVE YOU HAD ANY THOUGHTS OF HARMING ANYONE ELSE?: NO

## 2024-05-02 SDOH — SOCIAL STABILITY: SOCIAL INSECURITY: HAVE YOU HAD THOUGHTS OF HARMING ANYONE ELSE?: NO

## 2024-05-02 SDOH — SOCIAL STABILITY: SOCIAL INSECURITY: HAS ANYONE EVER THREATENED TO HURT YOUR FAMILY OR YOUR PETS?: NO

## 2024-05-02 SDOH — SOCIAL STABILITY: SOCIAL INSECURITY: DO YOU FEEL UNSAFE GOING BACK TO THE PLACE WHERE YOU ARE LIVING?: NO

## 2024-05-02 SDOH — SOCIAL STABILITY: SOCIAL INSECURITY: DOES ANYONE TRY TO KEEP YOU FROM HAVING/CONTACTING OTHER FRIENDS OR DOING THINGS OUTSIDE YOUR HOME?: NO

## 2024-05-02 ASSESSMENT — LIFESTYLE VARIABLES
SUBSTANCE_ABUSE_PAST_12_MONTHS: NO
EVER HAD A DRINK FIRST THING IN THE MORNING TO STEADY YOUR NERVES TO GET RID OF A HANGOVER: NO
HOW OFTEN DO YOU HAVE A DRINK CONTAINING ALCOHOL: NEVER
TOTAL SCORE: 0
AUDIT-C TOTAL SCORE: 0
PRESCIPTION_ABUSE_PAST_12_MONTHS: NO
HOW OFTEN DO YOU HAVE 6 OR MORE DRINKS ON ONE OCCASION: NEVER
HAVE PEOPLE ANNOYED YOU BY CRITICIZING YOUR DRINKING: NO
HOW MANY STANDARD DRINKS CONTAINING ALCOHOL DO YOU HAVE ON A TYPICAL DAY: PATIENT DOES NOT DRINK
HAVE YOU EVER FELT YOU SHOULD CUT DOWN ON YOUR DRINKING: NO
AUDIT-C TOTAL SCORE: 0
SKIP TO QUESTIONS 9-10: 1
EVER FELT BAD OR GUILTY ABOUT YOUR DRINKING: NO

## 2024-05-02 ASSESSMENT — ACTIVITIES OF DAILY LIVING (ADL)
FEEDING YOURSELF: INDEPENDENT
BATHING: NEEDS ASSISTANCE
LACK_OF_TRANSPORTATION: NO
TOILETING: NEEDS ASSISTANCE
ADEQUATE_TO_COMPLETE_ADL: YES
ASSISTIVE_DEVICE: WALKER
JUDGMENT_ADEQUATE_SAFELY_COMPLETE_DAILY_ACTIVITIES: NO
PATIENT'S MEMORY ADEQUATE TO SAFELY COMPLETE DAILY ACTIVITIES?: NO
HEARING - RIGHT EAR: FUNCTIONAL
GROOMING: INDEPENDENT
WALKS IN HOME: NEEDS ASSISTANCE
HEARING - LEFT EAR: FUNCTIONAL
DRESSING YOURSELF: INDEPENDENT

## 2024-05-02 ASSESSMENT — COGNITIVE AND FUNCTIONAL STATUS - GENERAL
DRESSING REGULAR UPPER BODY CLOTHING: A LITTLE
TURNING FROM BACK TO SIDE WHILE IN FLAT BAD: A LITTLE
PERSONAL GROOMING: A LITTLE
DAILY ACTIVITIY SCORE: 19
WALKING IN HOSPITAL ROOM: A LOT
STANDING UP FROM CHAIR USING ARMS: A LOT
TOILETING: A LITTLE
EATING MEALS: A LITTLE
MOBILITY SCORE: 14
PATIENT BASELINE BEDBOUND: NO
CLIMB 3 TO 5 STEPS WITH RAILING: A LOT
MOVING TO AND FROM BED TO CHAIR: A LOT
MOVING FROM LYING ON BACK TO SITTING ON SIDE OF FLAT BED WITH BEDRAILS: A LITTLE
HELP NEEDED FOR BATHING: A LITTLE

## 2024-05-02 ASSESSMENT — PATIENT HEALTH QUESTIONNAIRE - PHQ9
SUM OF ALL RESPONSES TO PHQ9 QUESTIONS 1 & 2: 0
2. FEELING DOWN, DEPRESSED OR HOPELESS: NOT AT ALL
1. LITTLE INTEREST OR PLEASURE IN DOING THINGS: NOT AT ALL

## 2024-05-02 ASSESSMENT — PAIN SCALES - GENERAL
PAINLEVEL_OUTOF10: 0 - NO PAIN

## 2024-05-02 ASSESSMENT — PAIN - FUNCTIONAL ASSESSMENT: PAIN_FUNCTIONAL_ASSESSMENT: 0-10

## 2024-05-03 ENCOUNTER — APPOINTMENT (OUTPATIENT)
Dept: CARDIOLOGY | Facility: HOSPITAL | Age: 79
DRG: 689 | End: 2024-05-03
Payer: MEDICARE

## 2024-05-03 LAB
ALBUMIN SERPL BCP-MCNC: 3.3 G/DL (ref 3.4–5)
ALP SERPL-CCNC: 54 U/L (ref 33–136)
ALT SERPL W P-5'-P-CCNC: 17 U/L (ref 7–45)
ANION GAP SERPL CALC-SCNC: 11 MMOL/L (ref 10–20)
APPEARANCE UR: ABNORMAL
AST SERPL W P-5'-P-CCNC: 21 U/L (ref 9–39)
BILIRUB SERPL-MCNC: 0.4 MG/DL (ref 0–1.2)
BILIRUB UR STRIP.AUTO-MCNC: NEGATIVE MG/DL
BUN SERPL-MCNC: 19 MG/DL (ref 6–23)
CALCIUM SERPL-MCNC: 8.9 MG/DL (ref 8.6–10.3)
CHLORIDE SERPL-SCNC: 109 MMOL/L (ref 98–107)
CO2 SERPL-SCNC: 26 MMOL/L (ref 21–32)
COLOR UR: ABNORMAL
CREAT SERPL-MCNC: 0.5 MG/DL (ref 0.5–1.05)
EGFRCR SERPLBLD CKD-EPI 2021: >90 ML/MIN/1.73M*2
ERYTHROCYTE [DISTWIDTH] IN BLOOD BY AUTOMATED COUNT: 12.9 % (ref 11.5–14.5)
FOLATE SERPL-MCNC: 13.8 NG/ML
GLUCOSE SERPL-MCNC: 104 MG/DL (ref 74–99)
GLUCOSE UR STRIP.AUTO-MCNC: NORMAL MG/DL
HCT VFR BLD AUTO: 32 % (ref 36–46)
HGB BLD-MCNC: 10 G/DL (ref 12–16)
HOLD SPECIMEN: NORMAL
KETONES UR STRIP.AUTO-MCNC: NEGATIVE MG/DL
LEUKOCYTE ESTERASE UR QL STRIP.AUTO: ABNORMAL
MCH RBC QN AUTO: 32.2 PG (ref 26–34)
MCHC RBC AUTO-ENTMCNC: 31.3 G/DL (ref 32–36)
MCV RBC AUTO: 103 FL (ref 80–100)
NITRITE UR QL STRIP.AUTO: NEGATIVE
NRBC BLD-RTO: 0 /100 WBCS (ref 0–0)
PH UR STRIP.AUTO: 5.5 [PH]
PLATELET # BLD AUTO: 175 X10*3/UL (ref 150–450)
POTASSIUM SERPL-SCNC: 3.2 MMOL/L (ref 3.5–5.3)
PROT SERPL-MCNC: 5.6 G/DL (ref 6.4–8.2)
PROT UR STRIP.AUTO-MCNC: NEGATIVE MG/DL
RBC # BLD AUTO: 3.11 X10*6/UL (ref 4–5.2)
RBC # UR STRIP.AUTO: NEGATIVE /UL
RBC #/AREA URNS AUTO: ABNORMAL /HPF
SODIUM SERPL-SCNC: 143 MMOL/L (ref 136–145)
SP GR UR STRIP.AUTO: 1.02
UROBILINOGEN UR STRIP.AUTO-MCNC: NORMAL MG/DL
WBC # BLD AUTO: 3.5 X10*3/UL (ref 4.4–11.3)
WBC #/AREA URNS AUTO: ABNORMAL /HPF

## 2024-05-03 PROCEDURE — 99232 SBSQ HOSP IP/OBS MODERATE 35: CPT | Performed by: STUDENT IN AN ORGANIZED HEALTH CARE EDUCATION/TRAINING PROGRAM

## 2024-05-03 PROCEDURE — 2500000006 HC RX 250 W HCPCS SELF ADMINISTERED DRUGS (ALT 637 FOR ALL PAYERS): Performed by: STUDENT IN AN ORGANIZED HEALTH CARE EDUCATION/TRAINING PROGRAM

## 2024-05-03 PROCEDURE — 2500000001 HC RX 250 WO HCPCS SELF ADMINISTERED DRUGS (ALT 637 FOR MEDICARE OP): Performed by: STUDENT IN AN ORGANIZED HEALTH CARE EDUCATION/TRAINING PROGRAM

## 2024-05-03 PROCEDURE — 93010 ELECTROCARDIOGRAM REPORT: CPT | Performed by: INTERNAL MEDICINE

## 2024-05-03 PROCEDURE — 96372 THER/PROPH/DIAG INJ SC/IM: CPT | Performed by: INTERNAL MEDICINE

## 2024-05-03 PROCEDURE — 80053 COMPREHEN METABOLIC PANEL: CPT | Performed by: INTERNAL MEDICINE

## 2024-05-03 PROCEDURE — 2500000001 HC RX 250 WO HCPCS SELF ADMINISTERED DRUGS (ALT 637 FOR MEDICARE OP): Performed by: INTERNAL MEDICINE

## 2024-05-03 PROCEDURE — 93005 ELECTROCARDIOGRAM TRACING: CPT

## 2024-05-03 PROCEDURE — 2500000005 HC RX 250 GENERAL PHARMACY W/O HCPCS: Performed by: STUDENT IN AN ORGANIZED HEALTH CARE EDUCATION/TRAINING PROGRAM

## 2024-05-03 PROCEDURE — 85027 COMPLETE CBC AUTOMATED: CPT | Performed by: INTERNAL MEDICINE

## 2024-05-03 PROCEDURE — G0378 HOSPITAL OBSERVATION PER HR: HCPCS

## 2024-05-03 PROCEDURE — 97535 SELF CARE MNGMENT TRAINING: CPT | Mod: GO

## 2024-05-03 PROCEDURE — 2500000001 HC RX 250 WO HCPCS SELF ADMINISTERED DRUGS (ALT 637 FOR MEDICARE OP): Performed by: PSYCHIATRY & NEUROLOGY

## 2024-05-03 PROCEDURE — 2500000004 HC RX 250 GENERAL PHARMACY W/ HCPCS (ALT 636 FOR OP/ED): Performed by: STUDENT IN AN ORGANIZED HEALTH CARE EDUCATION/TRAINING PROGRAM

## 2024-05-03 PROCEDURE — 81001 URINALYSIS AUTO W/SCOPE: CPT | Performed by: INTERNAL MEDICINE

## 2024-05-03 PROCEDURE — 2500000004 HC RX 250 GENERAL PHARMACY W/ HCPCS (ALT 636 FOR OP/ED): Performed by: INTERNAL MEDICINE

## 2024-05-03 PROCEDURE — 90792 PSYCH DIAG EVAL W/MED SRVCS: CPT | Performed by: PSYCHIATRY & NEUROLOGY

## 2024-05-03 PROCEDURE — 97530 THERAPEUTIC ACTIVITIES: CPT | Mod: GP

## 2024-05-03 PROCEDURE — 36415 COLL VENOUS BLD VENIPUNCTURE: CPT | Performed by: INTERNAL MEDICINE

## 2024-05-03 PROCEDURE — 97165 OT EVAL LOW COMPLEX 30 MIN: CPT | Mod: GO

## 2024-05-03 PROCEDURE — 97161 PT EVAL LOW COMPLEX 20 MIN: CPT | Mod: GP

## 2024-05-03 PROCEDURE — 87086 URINE CULTURE/COLONY COUNT: CPT | Mod: STJLAB | Performed by: INTERNAL MEDICINE

## 2024-05-03 RX ORDER — MIRTAZAPINE 15 MG/1
7.5 TABLET, FILM COATED ORAL NIGHTLY
Status: DISCONTINUED | OUTPATIENT
Start: 2024-05-03 | End: 2024-05-08 | Stop reason: HOSPADM

## 2024-05-03 RX ORDER — VALSARTAN 80 MG/1
80 TABLET ORAL DAILY
Status: DISCONTINUED | OUTPATIENT
Start: 2024-05-03 | End: 2024-05-08 | Stop reason: HOSPADM

## 2024-05-03 RX ORDER — METOPROLOL TARTRATE 1 MG/ML
5 INJECTION, SOLUTION INTRAVENOUS ONCE
Status: COMPLETED | OUTPATIENT
Start: 2024-05-03 | End: 2024-05-03

## 2024-05-03 RX ORDER — HYDRALAZINE HYDROCHLORIDE 20 MG/ML
5 INJECTION INTRAMUSCULAR; INTRAVENOUS ONCE
Status: COMPLETED | OUTPATIENT
Start: 2024-05-03 | End: 2024-05-03

## 2024-05-03 RX ORDER — CEFTRIAXONE 1 G/50ML
1 INJECTION, SOLUTION INTRAVENOUS EVERY 24 HOURS
Status: DISCONTINUED | OUTPATIENT
Start: 2024-05-03 | End: 2024-05-05

## 2024-05-03 RX ORDER — ASPIRIN 81 MG/1
81 TABLET ORAL DAILY
Status: DISCONTINUED | OUTPATIENT
Start: 2024-05-03 | End: 2024-05-08 | Stop reason: HOSPADM

## 2024-05-03 RX ORDER — VALSARTAN 80 MG/1
80 TABLET ORAL DAILY PRN
Status: DISCONTINUED | OUTPATIENT
Start: 2024-05-03 | End: 2024-05-03

## 2024-05-03 RX ORDER — ROSUVASTATIN CALCIUM 20 MG/1
20 TABLET, COATED ORAL NIGHTLY
Status: DISCONTINUED | OUTPATIENT
Start: 2024-05-03 | End: 2024-05-08 | Stop reason: HOSPADM

## 2024-05-03 RX ORDER — ALPRAZOLAM 0.25 MG/1
0.25 TABLET ORAL 2 TIMES DAILY PRN
Status: DISCONTINUED | OUTPATIENT
Start: 2024-05-03 | End: 2024-05-05

## 2024-05-03 RX ORDER — BUPROPION HYDROCHLORIDE 150 MG/1
150 TABLET ORAL 2 TIMES DAILY
Status: DISCONTINUED | OUTPATIENT
Start: 2024-05-03 | End: 2024-05-03

## 2024-05-03 RX ADMIN — SODIUM CHLORIDE 75 ML/HR: 9 INJECTION, SOLUTION INTRAVENOUS at 11:02

## 2024-05-03 RX ADMIN — CEFTRIAXONE SODIUM 1 G: 1 INJECTION, SOLUTION INTRAVENOUS at 12:45

## 2024-05-03 RX ADMIN — MIRTAZAPINE 7.5 MG: 15 TABLET, FILM COATED ORAL at 20:52

## 2024-05-03 RX ADMIN — PANTOPRAZOLE SODIUM 40 MG: 40 TABLET, DELAYED RELEASE ORAL at 06:27

## 2024-05-03 RX ADMIN — HYDRALAZINE HYDROCHLORIDE 5 MG: 20 INJECTION INTRAMUSCULAR; INTRAVENOUS at 15:29

## 2024-05-03 RX ADMIN — VALSARTAN 80 MG: 80 TABLET, FILM COATED ORAL at 12:17

## 2024-05-03 RX ADMIN — ASPIRIN 81 MG: 81 TABLET, COATED ORAL at 12:18

## 2024-05-03 RX ADMIN — METOPROLOL TARTRATE 5 MG: 5 INJECTION INTRAVENOUS at 16:33

## 2024-05-03 RX ADMIN — HALOPERIDOL LACTATE 5 MG: 5 INJECTION, SOLUTION INTRAMUSCULAR at 02:44

## 2024-05-03 RX ADMIN — ROSUVASTATIN CALCIUM 20 MG: 20 TABLET, FILM COATED ORAL at 20:53

## 2024-05-03 ASSESSMENT — COGNITIVE AND FUNCTIONAL STATUS - GENERAL
WALKING IN HOSPITAL ROOM: A LOT
MOVING FROM LYING ON BACK TO SITTING ON SIDE OF FLAT BED WITH BEDRAILS: A LOT
TURNING FROM BACK TO SIDE WHILE IN FLAT BAD: A LOT
CLIMB 3 TO 5 STEPS WITH RAILING: A LOT
DRESSING REGULAR UPPER BODY CLOTHING: A LOT
DRESSING REGULAR LOWER BODY CLOTHING: A LOT
STANDING UP FROM CHAIR USING ARMS: A LOT
PERSONAL GROOMING: A LOT
EATING MEALS: A LITTLE
TOILETING: A LOT
DAILY ACTIVITIY SCORE: 12
STANDING UP FROM CHAIR USING ARMS: A LOT
HELP NEEDED FOR BATHING: A LOT
WALKING IN HOSPITAL ROOM: A LOT
EATING MEALS: A LOT
PERSONAL GROOMING: A LITTLE
MOVING TO AND FROM BED TO CHAIR: A LOT
DAILY ACTIVITIY SCORE: 14
TOILETING: A LOT
DRESSING REGULAR UPPER BODY CLOTHING: A LOT
HELP NEEDED FOR BATHING: A LOT
DRESSING REGULAR LOWER BODY CLOTHING: A LOT
MOBILITY SCORE: 12
MOBILITY SCORE: 12
TURNING FROM BACK TO SIDE WHILE IN FLAT BAD: A LOT
CLIMB 3 TO 5 STEPS WITH RAILING: A LOT
MOVING TO AND FROM BED TO CHAIR: A LOT
MOVING FROM LYING ON BACK TO SITTING ON SIDE OF FLAT BED WITH BEDRAILS: A LOT

## 2024-05-03 ASSESSMENT — PAIN - FUNCTIONAL ASSESSMENT
PAIN_FUNCTIONAL_ASSESSMENT: 0-10

## 2024-05-03 ASSESSMENT — ACTIVITIES OF DAILY LIVING (ADL)
BATHING_ASSISTANCE: MODERATE
LACK_OF_TRANSPORTATION: NO
HOME_MANAGEMENT_TIME_ENTRY: 12

## 2024-05-03 ASSESSMENT — PAIN SCALES - GENERAL: PAINLEVEL_OUTOF10: 0 - NO PAIN

## 2024-05-03 NOTE — CARE PLAN
The patient's goals for the shift include free from falls and injuries     The clinical goals for the shift include remain hemodynamically stable.

## 2024-05-03 NOTE — CONSULTS
Reason for consult:  Depression     History Of Present Illness  Theodora Goel is a 78 y.o. female presenting with depression.  Patient was seen with her son and daughter in law.  Was very confused paranoid and agitated last night believing that her children are not safe.  She was also suspicious about some of her family members plotting against her.    Patient has been declining with her mental health over the past 3 weeks since she came to know about one of her close friend whom she consider like her sister was diagnosed with terminal cancer and she is dying.  Patient is tearful talking about the grief that she is already going through.  Patient feels guilty.  She has helpless feeling but denies any hopeless or worthless.  Patient denies any suicidal thoughts.  She has been lacking interest with poor concentration and decreased energy.  Her sleep has been decreased with initial insomnia but her appetite is intact.  Patient denies any audiovisual hallucinations.  As described above patient was paranoid but that was only in the last 2 days.  Never before she was paranoid.  Patient was using Xanax and Ambien from her home supply and the last time Ambien was filled was 3/22 for 30 tablet and Xanax was filled on 3/12  60 tablet 0.25 mg.  It is not really clear about her pattern of Xanax and Ambien use due to her ongoing confusion.  Patient was noticed to be frequently falling at home    Patient has a remote diagnosis of depression and was treated with Wellbutrin mostly for smoking cessation no past suicidal attempt    No alcohol or drug use        Past Medical History  She has a past medical history of Asymptomatic menopausal state, Encounter for other screening for malignant neoplasm of breast (06/24/2022), Encounter for screening for malignant neoplasm of colon (06/24/2022), Essential (primary) hypertension, History of falling (06/24/2022), Old myocardial infarction, Other conditions influencing health status,  "Other dysphagia (01/28/2022), Palpitations (06/02/2022), Personal history of diseases of the skin and subcutaneous tissue, Personal history of diseases of the skin and subcutaneous tissue (06/18/2021), Personal history of other diseases of the circulatory system (11/03/2015), Personal history of other diseases of the musculoskeletal system and connective tissue, Personal history of other diseases of urinary system, Personal history of other endocrine, nutritional and metabolic disease, Personal history of other mental and behavioral disorders (06/18/2021), Personal history of other specified conditions, Personal history of other specified conditions, Personal history of other specified conditions, Polyp of corpus uteri, Tachycardia, unspecified, and Unspecified asthma with (acute) exacerbation ([facility]).    Surgical History  She has a past surgical history that includes Nose surgery (11/02/2018); Other surgical history (11/19/2021); and Other surgical history (01/28/2022).     Social History  She reports that she quit smoking about 34 years ago. Her smoking use included cigarettes. She has never used smokeless tobacco. She reports current alcohol use. She reports that she does not use drugs.     Allergies  Patient has no known allergies.    Mental Status Exam  General: Alert and oriented x 3  Appearance: Stated age  Attitude: Cooperative  Behavior: Normal  Motor Activity: Normal  Speech: Normal rate and rhythm coherent  Mood: Depressed  Affect: Tearful  Thought Process: Normal  Thought Content: Helpless feeling but denies any hopeless or worthless  No suicidal thoughts  Thought Perception: No audiovisual hallucinations  Cognition:   Insight: Fair  Judgement: Fair      Last Recorded Vitals  Blood pressure (!) 191/89, pulse 84, temperature 36 °C (96.8 °F), temperature source Temporal, resp. rate 16, height 1.676 m (5' 5.98\"), weight 72.8 kg (160 lb 7.9 oz), SpO2 95%.    Relevant Results  Results for orders placed or " performed during the hospital encounter of 05/02/24 (from the past 96 hour(s))   Comprehensive metabolic panel   Result Value Ref Range    Glucose 89 74 - 99 mg/dL    Sodium 142 136 - 145 mmol/L    Potassium 3.6 3.5 - 5.3 mmol/L    Chloride 108 (H) 98 - 107 mmol/L    Bicarbonate 27 21 - 32 mmol/L    Anion Gap 11 10 - 20 mmol/L    Urea Nitrogen 26 (H) 6 - 23 mg/dL    Creatinine 0.62 0.50 - 1.05 mg/dL    eGFR >90 >60 mL/min/1.73m*2    Calcium 9.3 8.6 - 10.3 mg/dL    Albumin 3.9 3.4 - 5.0 g/dL    Alkaline Phosphatase 67 33 - 136 U/L    Total Protein 6.6 6.4 - 8.2 g/dL    AST 27 9 - 39 U/L    Bilirubin, Total 0.3 0.0 - 1.2 mg/dL    ALT 21 7 - 45 U/L   TSH with reflex to Free T4 if abnormal   Result Value Ref Range    Thyroid Stimulating Hormone 5.42 (H) 0.44 - 3.98 mIU/L   Folate   Result Value Ref Range    Folate, Serum 13.8 >5.0 ng/mL   Ammonia   Result Value Ref Range    Ammonia 11 (L) 16 - 53 umol/L   Troponin I, High Sensitivity   Result Value Ref Range    Troponin I, High Sensitivity 5 0 - 13 ng/L   CBC and Auto Differential   Result Value Ref Range    WBC 3.9 (L) 4.4 - 11.3 x10*3/uL    nRBC 0.0 0.0 - 0.0 /100 WBCs    RBC 3.65 (L) 4.00 - 5.20 x10*6/uL    Hemoglobin 11.8 (L) 12.0 - 16.0 g/dL    Hematocrit 37.0 36.0 - 46.0 %     (H) 80 - 100 fL    MCH 32.3 26.0 - 34.0 pg    MCHC 31.9 (L) 32.0 - 36.0 g/dL    RDW 13.0 11.5 - 14.5 %    Platelets 201 150 - 450 x10*3/uL    Neutrophils % 50.1 40.0 - 80.0 %    Immature Granulocytes %, Automated 0.3 0.0 - 0.9 %    Lymphocytes % 36.7 13.0 - 44.0 %    Monocytes % 10.0 2.0 - 10.0 %    Eosinophils % 2.6 0.0 - 6.0 %    Basophils % 0.3 0.0 - 2.0 %    Neutrophils Absolute 1.96 1.60 - 5.50 x10*3/uL    Immature Granulocytes Absolute, Automated 0.01 0.00 - 0.50 x10*3/uL    Lymphocytes Absolute 1.43 0.80 - 3.00 x10*3/uL    Monocytes Absolute 0.39 0.05 - 0.80 x10*3/uL    Eosinophils Absolute 0.10 0.00 - 0.40 x10*3/uL    Basophils Absolute 0.01 0.00 - 0.10 x10*3/uL   Creatine  Kinase   Result Value Ref Range    Creatine Kinase 25 0 - 215 U/L   Protime-INR   Result Value Ref Range    Protime 10.8 9.8 - 12.8 seconds    INR 1.0 0.9 - 1.1   Thyroxine, Free   Result Value Ref Range    Thyroxine, Free 0.76 0.61 - 1.12 ng/dL   ECG 12 Lead   Result Value Ref Range    Ventricular Rate 63 BPM    Atrial Rate 63 BPM    MN Interval 216 ms    QRS Duration 94 ms    QT Interval 432 ms    QTC Calculation(Bazett) 442 ms    P Axis 51 degrees    R Axis -32 degrees    T Axis 22 degrees    QRS Count 10 beats    Q Onset 214 ms    P Onset 106 ms    P Offset 173 ms    T Offset 430 ms    QTC Fredericia 439 ms   Sars-CoV-2 PCR   Result Value Ref Range    Coronavirus 2019, PCR Not Detected Not Detected   Influenza A, and B PCR   Result Value Ref Range    Flu A Result Not Detected Not Detected    Flu B Result Not Detected Not Detected   CBC   Result Value Ref Range    WBC 3.5 (L) 4.4 - 11.3 x10*3/uL    nRBC 0.0 0.0 - 0.0 /100 WBCs    RBC 3.11 (L) 4.00 - 5.20 x10*6/uL    Hemoglobin 10.0 (L) 12.0 - 16.0 g/dL    Hematocrit 32.0 (L) 36.0 - 46.0 %     (H) 80 - 100 fL    MCH 32.2 26.0 - 34.0 pg    MCHC 31.3 (L) 32.0 - 36.0 g/dL    RDW 12.9 11.5 - 14.5 %    Platelets 175 150 - 450 x10*3/uL   Comprehensive metabolic panel   Result Value Ref Range    Glucose 104 (H) 74 - 99 mg/dL    Sodium 143 136 - 145 mmol/L    Potassium 3.2 (L) 3.5 - 5.3 mmol/L    Chloride 109 (H) 98 - 107 mmol/L    Bicarbonate 26 21 - 32 mmol/L    Anion Gap 11 10 - 20 mmol/L    Urea Nitrogen 19 6 - 23 mg/dL    Creatinine 0.50 0.50 - 1.05 mg/dL    eGFR >90 >60 mL/min/1.73m*2    Calcium 8.9 8.6 - 10.3 mg/dL    Albumin 3.3 (L) 3.4 - 5.0 g/dL    Alkaline Phosphatase 54 33 - 136 U/L    Total Protein 5.6 (L) 6.4 - 8.2 g/dL    AST 21 9 - 39 U/L    Bilirubin, Total 0.4 0.0 - 1.2 mg/dL    ALT 17 7 - 45 U/L   Urinalysis with Reflex Culture and Microscopic   Result Value Ref Range    Color, Urine Light-Yellow Light-Yellow, Yellow, Dark-Yellow    Appearance,  Urine Turbid (N) Clear    Specific Gravity, Urine 1.018 1.005 - 1.035    pH, Urine 5.5 5.0, 5.5, 6.0, 6.5, 7.0, 7.5, 8.0    Protein, Urine NEGATIVE NEGATIVE, 10 (TRACE), 20 (TRACE) mg/dL    Glucose, Urine Normal Normal mg/dL    Blood, Urine NEGATIVE NEGATIVE    Ketones, Urine NEGATIVE NEGATIVE mg/dL    Bilirubin, Urine NEGATIVE NEGATIVE    Urobilinogen, Urine Normal Normal mg/dL    Nitrite, Urine NEGATIVE NEGATIVE    Leukocyte Esterase, Urine 250 Irena/µL (A) NEGATIVE   Microscopic Only, Urine   Result Value Ref Range    WBC, Urine 11-20 (A) 1-5, NONE /HPF    RBC, Urine 1-2 NONE, 1-2, 3-5 /HPF     ECG 12 Lead    Result Date: 5/3/2024  Sinus rhythm with 1st degree AV block Left axis deviation Abnormal ECG When compared with ECG of 26-JUL-2013 10:06, NC interval has increased    XR chest 1 view    Result Date: 5/2/2024  STUDY: Chest Radiograph;  05/02/2024, 6:55 PM. INDICATION: Altered mental status. COMPARISON: CXR: 01/08/24, 06/24/22, 01/28/22. ACCESSION NUMBER(S): [id number] ORDERING CLINICIAN: WILLIAN MEAD TECHNIQUE:  Frontal chest was obtained at 18:55 hours. FINDINGS: CARDIOMEDIASTINAL SILHOUETTE: Cardiomediastinal silhouette is mildly enlarged but without significant change considering differences in technique and depth of inspiration lower on the current study.  Heart size likely upper limits of recorder device projecting over the left side of the heart.  LUNGS: Relatively low depth of inspiration without focal consolidation or pulmonary edema.  No pleural effusions or pneumothorax.  ABDOMEN: No remarkable upper abdominal findings.  BONES: No acute osseous changes.  Degenerative changes of the glenohumeral joints, and associated ossified loose bodies.  Mild thoracic scoliosis.    No acute pulmonary process. Signed by Shyam Cruz DO    CT head wo IV contrast    Result Date: 5/2/2024  Interpreted By:  Edward Subramanian, STUDY: CT HEAD WO IV CONTRAST;  5/2/2024 7:02 pm   INDICATION: Signs/Symptoms:frequent  falls generalized weakness.   COMPARISON: None.   ACCESSION NUMBER(S): [id number]   ORDERING CLINICIAN: ALEJANDRA CROSS   TECHNIQUE: Noncontrast axial CT images of head were obtained with coronal and sagittal reconstructed images.   FINDINGS: BRAIN PARENCHYMA:  No acute intraparenchymal hemorrhage or parenchymal evidence of acute large territory ischemic infarct. No mass-effect. Gray-white matter distinction is preserved.   VENTRICLES and EXTRA-AXIAL SPACES:  No acute extra-axial or intraventricular hemorrhage. No effacement of cerebral sulci. Ventricles and sulci are age-concordant.   PARANASAL SINUSES/MASTOIDS:  No hemorrhage or air-fluid levels within the visualized paranasal sinuses. The mastoids are well aerated.   CALVARIUM/ORBITS:  No skull fracture.  The orbits and globes are intact to the extent visualized.   EXTRACRANIAL SOFT TISSUES: No discernible abnormality.       1. No acute intracranial abnormality.         MACRO: None.   Signed by: Edward Subramanian 5/2/2024 7:25 PM Dictation workstation:   [id number]    CT lumbar spine wo IV contrast    Result Date: 4/14/2024  * * *Final Report* * * DATE OF EXAM: Apr 14 2024  3:08PM   [facility]   [id number]  -  CT LUMBAR SPINE WO IVCON  / ACCESSION #  [id number] PROCEDURE REASON: Low back pain, trauma      * * * * Physician Interpretation * * * *  EXAMINATION:  CT THORACIC SPINE WO IVCON, CT LUMBAR SPINE WO IVCON CLINICAL HISTORY:  Fall, concern for spine fracture. TECHNIQUE: Spiral, high resolution axial unenhanced  images were obtained from the cervicothoracic junction to the sacrum with sagittal and coronal planar reconstructions. MQ:  CTTLWO_3 CT Radiation dose: Integrated Dose-Length Product (DLP) for this visit =   1503 mGy*cm. CT Dose Reduction Employed: Automated exposure control(AEC) and iterative recon COMPARISON: None. RESULT: Counting reference:  Cervicothoracic and lumbosacral junctions.  Assume first thoracic rib is the T1 level.  For the purposes of this report,    L4-5 is considered the level of the iliac crest and assume there are 5 lumbar-type vertebrae.  Anatomic variant:  None.  (topogram) images:  No additional findings. Alignment:    Preserved thoracic kyphosis and lumbar lordosis.  Mild right convex curvature centered at T5-T6.  Minimal left convex curvature centered at L1-L2 and right convex curvature centered at L3-L4.  Minimal retrolisthesis of L1 on L2, L2 and L3, and L3-L4. Bone marrow / fracture: Multiple thoracic compression fractures as detailed below (level, approximate degree of height loss): -T1, 10% -T3, 20% -T4, 20% -T6, 30% -T9, 20% -T10, 20% -T11, 40% with mild associated retropulsion and canal narrowing No fracture in the lumbar spine.  No aggressive osseous lesions. Canal and foramina:  Multilevel degenerative changes in the thoracolumbar spine most pronounced at L2-L3 where there is severe disc height loss and a posterior disc osteophyte complex which mildly narrows the central canal.  Multilevel facet arthropathy.  No high-grade canal or foraminal narrowing is seen. Paraspinal and thoracoabdominal soft tissues:   The paraspinal soft tissues planes are maintained.  Bilateral nonobstructing calculi measuring up to 0.4 cm on the right.  Diffuse atherosclerotic calcifications, including of the coronary arteries.  Normal caliber aorta.  Aortic valve leaflet calcifications. Pelvic bones:    The visualized sacrum and iliac wings are within normal limits.  The presacral soft tissues are normal in appearance.  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  Mild bilateral sacroiliac joint osteoarthritis.    IMPRESSION: Multiple age indeterminate thoracic compression fractures, as detailed. Anatomic Thoracic/Lumbar Variant: None.  L4-5 is considered the level of the iliac crest and assume there are 5 lumbar-type vertebrae. : [facility]O   Transcribe Date/Time: Apr 14 2024  4:10P Dictated by : PARISH GUAMAN MD This  examination was interpreted and the report reviewed and electronically signed by: PARISH GUAMAN MD on Apr 14 2024  4:23PM  EST    CT thoracic spine wo IV contrast    Result Date: 4/14/2024  * * *Final Report* * * DATE OF EXAM: Apr 14 2024  3:08PM   [facility]   0514  -  CT THORACIC SPINE WO IVCON  / ACCESSION #  [id number] PROCEDURE REASON: Spine fracture, thoracic, traumatic      * * * * Physician Interpretation * * * *  EXAMINATION:  CT THORACIC SPINE WO IVCON, CT LUMBAR SPINE WO IVCON CLINICAL HISTORY:  Fall, concern for spine fracture. TECHNIQUE: Spiral, high resolution axial unenhanced  images were obtained from the cervicothoracic junction to the sacrum with sagittal and coronal planar reconstructions. MQ:  CTTLWO_3 CT Radiation dose: Integrated Dose-Length Product (DLP) for this visit =   1503 mGy*cm. CT Dose Reduction Employed: Automated exposure control(AEC) and iterative recon COMPARISON: None. RESULT: Counting reference:  Cervicothoracic and lumbosacral junctions.  Assume first thoracic rib is the T1 level.  For the purposes of this report,   L4-5 is considered the level of the iliac crest and assume there are 5 lumbar-type vertebrae.  Anatomic variant:  None.  (topogram) images:  No additional findings. Alignment:    Preserved thoracic kyphosis and lumbar lordosis.  Mild right convex curvature centered at T5-T6.  Minimal left convex curvature centered at L1-L2 and right convex curvature centered at L3-L4.  Minimal retrolisthesis of L1 on L2, L2 and L3, and L3-L4. Bone marrow / fracture: Multiple thoracic compression fractures as detailed below (level, approximate degree of height loss): -T1, 10% -T3, 20% -T4, 20% -T6, 30% -T9, 20% -T10, 20% -T11, 40% with mild associated retropulsion and canal narrowing No fracture in the lumbar spine.  No aggressive osseous lesions. Canal and foramina:  Multilevel degenerative changes in the thoracolumbar spine most pronounced at L2-L3 where there is severe disc height  loss and a posterior disc osteophyte complex which mildly narrows the central canal.  Multilevel facet arthropathy.  No high-grade canal or foraminal narrowing is seen. Paraspinal and thoracoabdominal soft tissues:   The paraspinal soft tissues planes are maintained.  Bilateral nonobstructing calculi measuring up to 0.4 cm on the right.  Diffuse atherosclerotic calcifications, including of the coronary arteries.  Normal caliber aorta.  Aortic valve leaflet calcifications. Pelvic bones:    The visualized sacrum and iliac wings are within normal limits.  The presacral soft tissues are normal in appearance.  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  Mild bilateral sacroiliac joint osteoarthritis.    IMPRESSION: Multiple age indeterminate thoracic compression fractures, as detailed. Anatomic Thoracic/Lumbar Variant: None.  L4-5 is considered the level of the iliac crest and assume there are 5 lumbar-type vertebrae. : [facility]   Transcribe Date/Time: Apr 14 2024  4:10P Dictated by : PARISH GUAMAN MD This examination was interpreted and the report reviewed and electronically signed by: PARISH GUAMAN MD on Apr 14 2024  4:23PM  EST    XR knee left 1-2 views    Result Date: 4/14/2024  * * *Final Report* * * DATE OF EXAM: Apr 14 2024  3:33PM   [facility]   [id number]  -  XR KNEE 2V AP/LAT LT  / ACCESSION #  [id number] PROCEDURE REASON: Trauma      * * * * Physician Interpretation * * * *  EXAM:XR KNEE 2V AP/LAT LT, XR KNEE 2V AP/LAT RT HISTORY: Trauma COMPARISON:None    IMPRESSION:There is no fracture or dislocation. There are tiny osteophytes at the tibial plateau and patella bilaterally. There is no joint effusion or focal soft tissue swelling. There is mild diffuse osteopenia. : [facility]    Transcribe Date/Time: Apr 14 2024  4:03P Dictated by : LYNDON MOSS MD This examination was interpreted and the report reviewed and electronically signed by: LYNDON MOSS MD on Apr 14 2024   4:04PM  EST    XR knee right 1-2 views    Result Date: 4/14/2024  * * *Final Report* * * DATE OF EXAM: Apr 14 2024  3:33PM   [facility]   [id number]  -  XR KNEE 2V AP/LAT RT  / ACCESSION #  [id number] PROCEDURE REASON: Trauma      * * * * Physician Interpretation * * * *  EXAM:XR KNEE 2V AP/LAT LT, XR KNEE 2V AP/LAT RT HISTORY: Trauma COMPARISON:None    IMPRESSION:There is no fracture or dislocation. There are tiny osteophytes at the tibial plateau and patella bilaterally. There is no joint effusion or focal soft tissue swelling. There is mild diffuse osteopenia. : [facility]    Transcribe Date/Time: Apr 14 2024  4:03P Dictated by : LYNDON MOSS MD This examination was interpreted and the report reviewed and electronically signed by: LYNDON MOSS MD on Apr 14 2024  4:04PM  EST    XR chest 1 view    Result Date: 4/14/2024  * * *Final Report* * * DATE OF EXAM: Apr 14 2024  3:33PM   [facility]   [id number]  -  XR CHEST 1V FRONTAL PORT  / ACCESSION #  [id number] PROCEDURE REASON: Shortness of breath      * * * * Physician Interpretation * * * *  EXAMINATION:  CHEST RADIOGRAPH (PORTABLE SINGLE VIEW AP) Exam Date/Time:  4/14/2024 3:33 PM CLINICAL HISTORY: Shortness of breath MQ:  XCPR_5 Comparison:  None. RESULT: Lines, tubes, and devices:  There is a leadless pacemaker in the left lower chest. Lungs and pleura:  No evidence of infiltrate or edema.  The pleural margins appear normal. Cardiomediastinal silhouette:  Unremarkable cardiomediastinal silhouette. Other:  There are mild degenerative changes involving the shoulder joints with a loose body seen below the right shoulder joint.    IMPRESSION: Chronic changes as described above with no definite acute disease. : [facility]   Transcribe Date/Time: Apr 14 2024  3:45P Dictated by : TRICIA ELLIS MD This examination was interpreted and the report reviewed and electronically signed by: TRICIA ELLIS MD on Apr 14 2024  3:46PM  EST    CT cervical spine wo IV  contrast    Result Date: 4/14/2024  * * *Final Report* * * DATE OF EXAM: Apr 14 2024  3:04PM   [facility]   0505  -  CT CERVICAL SPINE WO IVCON  / ACCESSION #  [id number] PROCEDURE REASON: Spine fracture, cervical, traumatic      * * * * Physician Interpretation * * * *  EXAMINATION:  CT BRAIN WO IVCON, CT CERVICAL SPINE WO IVCON CLINICAL HISTORY:  Status post fall TECHNIQUE:  Serial axial unenhanced images were obtained from the  vertex to the foramen magnum.  Spiral, high resolution axial unenhanced images were obtained from the skull base to the cervicothoracic junction with sagittal and coronal planar reconstructions. MQ:  CTBCSWO_3 Dose-Length Product (DLP): 1176 mGy*cm. CT Dose Reduction Employed: Automated exposure control(AEC) and iterative recon COMPARISON:  None. RESULT: BRAIN: Acute change:   No evidence of an acute contusion or other acute parenchymal process. Hemorrhage:    No evidence of acute intracranial hemorrhage. Mass lesion / Mass effect:   There is no evidence of an intracranial mass or extraaxial fluid collection.  No significant mass effect. Chronic change:   Scattered patchy foci of low attenuation are present within supratentorial white matter which is a nonspecific finding but likely represents mild microvascular ischemia. Parenchyma:  There is moderate generalized volume loss. Ventricles:   Ventricular enlargement concordant with the degree of parenchymal volume loss. Paranasal sinuses and skull base:  The visualized paranasal sinuses are grossly clear.  The skull base and visualized extracranial soft tissues are grossly normal. CERVICAL: Counting reference:  Craniocervical junction.   Anatomic Variants:  None. Alignment:    Alignment is anatomic. Craniocervical junction:    Craniocervical junction is normal. Osseous structures/fracture:    No evidence of a lytic or blastic process in the visualized spine.  No evidence of acute or chronic fracture. Cervical soft tissues:    The paraspinal soft  tissues planes are maintained. Degenerative changes: There is moderate to severe anterior osteophytosis, intervertebral disc space loss, endplate sclerosis and uncovertebral hypertrophy of the lower cervical spine, worse at C5-6 and C6-7. Others: Images of the lung apices demonstrate patchy biapical fibrosis.    IMPRESSION: NO CT EVIDENCE OF ACUTE INTRACRANIAL PROCESS. NO CT EVIDENCE OF AN ACUTE CERVICAL SPINE FRACTURE. Anatomic Variant:  None.  Assume 7 cervical vertebrae with counting from the craniocervical junction. : [facility]   Transcribe Date/Time: Apr 14 2024  3:27P Dictated by : KRISTIN NARVAEZ MD This examination was interpreted and the report reviewed and electronically signed by: KRISTIN NARVAEZ MD on Apr 14 2024  3:33PM  EST    CT head wo IV contrast    Result Date: 4/14/2024  * * *Final Report* * * DATE OF EXAM: Apr 14 2024  3:04PM   [facility]   0504  -  CT BRAIN WO IVCON   / ACCESSION #  [id number] PROCEDURE REASON: Head trauma, moderate-severe      * * * * Physician Interpretation * * * *  EXAMINATION:  CT BRAIN WO IVCON, CT CERVICAL SPINE WO IVCON CLINICAL HISTORY:  Status post fall TECHNIQUE:  Serial axial unenhanced images were obtained from the  vertex to the foramen magnum.  Spiral, high resolution axial unenhanced images were obtained from the skull base to the cervicothoracic junction with sagittal and coronal planar reconstructions. MQ:  CTBCSWO_3 Dose-Length Product (DLP): 1176 mGy*cm. CT Dose Reduction Employed: Automated exposure control(AEC) and iterative recon COMPARISON:  None.  RESULT: BRAIN: Acute change:   No evidence of an acute contusion or other acute parenchymal process. Hemorrhage:    No evidence of acute intracranial hemorrhage. Mass lesion / Mass effect:   There is no evidence of an intracranial mass or extraaxial fluid collection.  No significant mass effect. Chronic change:   Scattered patchy foci of low attenuation are present within supratentorial white matter which is a  nonspecific finding but likely represents mild microvascular ischemia. Parenchyma:  There is moderate generalized volume loss. Ventricles:   Ventricular enlargement concordant with the degree of parenchymal volume loss. Paranasal sinuses and skull base:  The visualized paranasal sinuses are grossly clear.  The skull base and visualized extracranial soft tissues are grossly normal. CERVICAL: Counting reference:  Craniocervical junction.   Anatomic Variants:  None. Alignment:    Alignment is anatomic. Craniocervical junction:    Craniocervical junction is normal. Osseous structures/fracture:    No evidence of a lytic or blastic process in the visualized spine.  No evidence of acute or chronic fracture. Cervical soft tissues:    The paraspinal soft tissues planes are maintained.  Degenerative changes: There is moderate to severe anterior osteophytosis, intervertebral disc space loss, endplate sclerosis and uncovertebral hypertrophy of the lower cervical spine, worse at C5-6 and C6-7. Others: Images of the lung apices demonstrate patchy biapical fibrosis.    IMPRESSION: NO CT EVIDENCE OF ACUTE INTRACRANIAL PROCESS. NO CT EVIDENCE OF AN ACUTE CERVICAL SPINE FRACTURE. Anatomic Variant:  None.  Assume 7 cervical vertebrae with counting from the craniocervical junction. [facility]   Transcribe Date/Time: Apr 14 2024  3:27P Dictated by : KRISTIN NARVAEZ MD This examination was interpreted and the report reviewed and electronically signed by: KRISTIN NARVAEZ MD on Apr 14 2024  3:33PM  EST         Assessment/Plan   MDD recurrent moderate    DC Xanax and Ambien  Monitor for any benzo withdrawal  DC Wellbutrin since patient has not been taking this medicine for years and for some reason it was restarted during this admission  Discussed with patient and the family about starting her on Remeron 7.5 mg for 1 week increase to 15 mg from second week if tolerating well.  They are agreeable to the treatment plan  Patient is not  at imminent risk of suicide.  She would benefit from counseling and follow-up care  Will continue to follow during her stay in the hospital           Chad Hobbs MD

## 2024-05-03 NOTE — H&P
History Of Present Illness  Theodora Goel is a 78 y.o. female presenting with family members at bedside who endorse increasing fall frequency as well as increased confusion.  Of note, patient was recently admitted and discharged to an outside facility for similar symptoms.  At that time, it was believed that patient's symptoms were related to dehydration, transient hypotension, as well as the use of multiple sedating medications including tramadol, Xanax, as well as Ambien.  It was recommended that patient discontinue these medications however per report, they have not been discontinued.  Patient has PT/OT set up following hospital discharge.  Per report, had outpatient neurology appointment scheduled but declined going.     She continues to endorse weakness and reports episodes of urinary incontinence.  Denies fecal incontinence.  Denies any other associated symptoms.  Does endorse feeling sad and overwhelmed with a friend's terminal illness.  No suicidal thoughts.  Additional laboratory studies unremarkable.  Urinalysis currently pending.     Past Medical History  Past Medical History:   Diagnosis Date    Asymptomatic menopausal state     Menopause    Encounter for other screening for malignant neoplasm of breast 06/24/2022    Breast screening    Encounter for screening for malignant neoplasm of colon 06/24/2022    Colon cancer screening    Essential (primary) hypertension     Hypertension, benign    History of falling 06/24/2022    H/O fall    Old myocardial infarction     History of myocardial infarction    Other conditions influencing health status     No significant past surgical history    Other dysphagia 01/28/2022    Other dysphagia    Palpitations 06/02/2022    Palpitations    Personal history of diseases of the skin and subcutaneous tissue     History of alopecia    Personal history of diseases of the skin and subcutaneous tissue 06/18/2021    History of eczema    Personal history of other diseases  of the circulatory system 11/03/2015    History of supraventricular tachycardia    Personal history of other diseases of the musculoskeletal system and connective tissue     History of osteoarthritis    Personal history of other diseases of urinary system     History of renal failure    Personal history of other endocrine, nutritional and metabolic disease     History of hypothyroidism    Personal history of other mental and behavioral disorders 06/18/2021    History of anxiety    Personal history of other specified conditions     History of prolonged Q-T interval on ECG    Personal history of other specified conditions     History of shortness of breath    Personal history of other specified conditions     History of fatigue    Polyp of corpus uteri     Uterine polyp    Tachycardia, unspecified     Wide-complex tachycardia    Unspecified asthma with (acute) exacerbation ([facility])     Asthma exacerbation       Surgical History  Past Surgical History:   Procedure Laterality Date    NOSE SURGERY  11/02/2018    Nose Surgery    OTHER SURGICAL HISTORY  11/19/2021    Loop recorder insertion    OTHER SURGICAL HISTORY  01/28/2022    Cardioverter defibrillator insertion        Social History  She reports that she quit smoking about 34 years ago. Her smoking use included cigarettes. She has never used smokeless tobacco. She reports current alcohol use. She reports that she does not use drugs.    Family History  Family History   Problem Relation Name Age of Onset    Coronary artery disease Mother      Atrial fibrillation Mother      Hypertension Mother      Heart attack Mother      Stroke Mother      Coronary artery disease Father      Coronary artery disease Sister      Hypertension Sister      Colon cancer Paternal Grandfather  70        Allergies  Patient has no known allergies.    Review of Systems   All other systems reviewed and are negative.       Physical Exam  Vitals reviewed.   Constitutional:       Appearance:  "Normal appearance.   HENT:      Head: Normocephalic and atraumatic.      Nose: Nose normal.      Mouth/Throat:      Mouth: Mucous membranes are moist.   Eyes:      Extraocular Movements: Extraocular movements intact.      Conjunctiva/sclera: Conjunctivae normal.      Pupils: Pupils are equal, round, and reactive to light.   Cardiovascular:      Rate and Rhythm: Normal rate and regular rhythm.      Pulses: Normal pulses.      Heart sounds: Normal heart sounds.   Pulmonary:      Effort: Pulmonary effort is normal.      Breath sounds: Normal breath sounds.   Abdominal:      General: Bowel sounds are normal.      Palpations: Abdomen is soft.   Musculoskeletal:         General: Normal range of motion.      Cervical back: Normal range of motion and neck supple.   Skin:     General: Skin is warm and dry.   Neurological:      General: No focal deficit present.      Mental Status: She is alert. Mental status is at baseline.   Psychiatric:         Mood and Affect: Mood normal.         Behavior: Behavior normal.          Last Recorded Vitals  Blood pressure 167/79, pulse 66, temperature 36 °C (96.8 °F), temperature source Temporal, resp. rate (!) 26, height 1.676 m (5' 6\"), weight 65.8 kg (145 lb), SpO2 97%.    Relevant Results  Scheduled medications    Continuous medications    PRN medications    XR chest 1 view    Result Date: 5/2/2024  STUDY: Chest Radiograph;  05/02/2024, 6:55 PM. INDICATION: Altered mental status. COMPARISON: CXR: 01/08/24, 06/24/22, 01/28/22. ACCESSION NUMBER(S): [id number] ORDERING CLINICIAN: WILLIAN MEAD TECHNIQUE:  Frontal chest was obtained at 18:55 hours. FINDINGS: CARDIOMEDIASTINAL SILHOUETTE: Cardiomediastinal silhouette is mildly enlarged but without significant change considering differences in technique and depth of inspiration lower on the current study.  Heart size likely upper limits of recorder device projecting over the left side of the heart.  LUNGS: Relatively low depth of inspiration " without focal consolidation or pulmonary edema.  No pleural effusions or pneumothorax.  ABDOMEN: No remarkable upper abdominal findings.  BONES: No acute osseous changes.  Degenerative changes of the glenohumeral joints, and associated ossified loose bodies.  Mild thoracic scoliosis.    No acute pulmonary process. Signed by Shyam Cruz DO    CT head wo IV contrast    Result Date: 5/2/2024  Interpreted By:  Edward Subramanian, STUDY: CT HEAD WO IV CONTRAST;  5/2/2024 7:02 pm   INDICATION: Signs/Symptoms:frequent falls generalized weakness.   COMPARISON: None.   ACCESSION NUMBER(S): [id number]   ORDERING CLINICIAN: ALEJANDRA CROSS   TECHNIQUE: Noncontrast axial CT images of head were obtained with coronal and sagittal reconstructed images.   FINDINGS: BRAIN PARENCHYMA:  No acute intraparenchymal hemorrhage or parenchymal evidence of acute large territory ischemic infarct. No mass-effect. Gray-white matter distinction is preserved.   VENTRICLES and EXTRA-AXIAL SPACES:  No acute extra-axial or intraventricular hemorrhage. No effacement of cerebral sulci. Ventricles and sulci are age-concordant.   PARANASAL SINUSES/MASTOIDS:  No hemorrhage or air-fluid levels within the visualized paranasal sinuses. The mastoids are well aerated.   CALVARIUM/ORBITS:  No skull fracture.  The orbits and globes are intact to the extent visualized.   EXTRACRANIAL SOFT TISSUES: No discernible abnormality.       1. No acute intracranial abnormality.         MACRO: None.   Signed by: Edward Subramanian 5/2/2024 7:25 PM Dictation workstation:   [id number]    CT lumbar spine wo IV contrast    Result Date: 4/14/2024  * * *Final Report* * * DATE OF EXAM: Apr 14 2024  3:08PM   [facility]   [id number]  -  CT LUMBAR SPINE WO IVCON  / ACCESSION #  [id number] PROCEDURE REASON: Low back pain, trauma      * * * * Physician Interpretation * * * *  EXAMINATION:  CT THORACIC SPINE WO IVCON, CT LUMBAR SPINE WO IVCON CLINICAL HISTORY:  Fall, concern for spine fracture.  TECHNIQUE: Spiral, high resolution axial unenhanced  images were obtained from the cervicothoracic junction to the sacrum with sagittal and coronal planar reconstructions. MQ:  CTTLWO_3 CT Radiation dose: Integrated Dose-Length Product (DLP) for this visit =   1503 mGy*cm. CT Dose Reduction Employed: Automated exposure control(AEC) and iterative recon COMPARISON: None. RESULT: Counting reference:  Cervicothoracic and lumbosacral junctions.  Assume first thoracic rib is the T1 level.  For the purposes of this report,   L4-5 is considered the level of the iliac crest and assume there are 5 lumbar-type vertebrae.  Anatomic variant:  None.  (topogram) images:  No additional findings. Alignment:    Preserved thoracic kyphosis and lumbar lordosis.  Mild right convex curvature centered at T5-T6.  Minimal left convex curvature centered at L1-L2 and right convex curvature centered at L3-L4.  Minimal retrolisthesis of L1 on L2, L2 and L3, and L3-L4. Bone marrow / fracture: Multiple thoracic compression fractures as detailed below (level, approximate degree of height loss): -T1, 10% -T3, 20% -T4, 20% -T6, 30% -T9, 20% -T10, 20% -T11, 40% with mild associated retropulsion and canal narrowing No fracture in the lumbar spine.  No aggressive osseous lesions. Canal and foramina:  Multilevel degenerative changes in the thoracolumbar spine most pronounced at L2-L3 where there is severe disc height loss and a posterior disc osteophyte complex which mildly narrows the central canal.  Multilevel facet arthropathy.  No high-grade canal or foraminal narrowing is seen. Paraspinal and thoracoabdominal soft tissues:   The paraspinal soft tissues planes are maintained.  Bilateral nonobstructing calculi measuring up to 0.4 cm on the right.  Diffuse atherosclerotic calcifications, including of the coronary arteries.  Normal caliber aorta.  Aortic valve leaflet calcifications. Pelvic bones:    The visualized sacrum and iliac wings are  within normal limits.  The presacral soft tissues are normal in appearance.  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  Mild bilateral sacroiliac joint osteoarthritis.    IMPRESSION: Multiple age indeterminate thoracic compression fractures, as detailed. Anatomic Thoracic/Lumbar Variant: None.  L4-5 is considered the level of the iliac crest and assume there are 5 lumbar-type vertebrae. : DAGOBERTO   Transcribe Date/Time: Apr 14 2024  4:10P Dictated by : PARISH GUAMAN MD This examination was interpreted and the report reviewed and electronically signed by: PARISH GUAMAN MD on Apr 14 2024  4:23PM  EST    CT thoracic spine wo IV contrast    Result Date: 4/14/2024  * * *Final Report* * * DATE OF EXAM: Apr 14 2024  3:08PM   [facility]   0514  -  CT THORACIC SPINE WO IVCON  / ACCESSION #  [id number] PROCEDURE REASON: Spine fracture, thoracic, traumatic      * * * * Physician Interpretation * * * *  EXAMINATION:  CT THORACIC SPINE WO IVCON, CT LUMBAR SPINE WO IVCON CLINICAL HISTORY:  Fall, concern for spine fracture. TECHNIQUE: Spiral, high resolution axial unenhanced  images were obtained from the cervicothoracic junction to the sacrum with sagittal and coronal planar reconstructions. MQ:  CTTLWO_3 CT Radiation dose: Integrated Dose-Length Product (DLP) for this visit =   1503 mGy*cm. CT Dose Reduction Employed: Automated exposure control(AEC) and iterative recon COMPARISON: None. RESULT: Counting reference:  Cervicothoracic and lumbosacral junctions.  Assume first thoracic rib is the T1 level.  For the purposes of this report,   L4-5 is considered the level of the iliac crest and assume there are 5 lumbar-type vertebrae.  Anatomic variant:  None.  (topogram) images:  No additional findings. Alignment:    Preserved thoracic kyphosis and lumbar lordosis.  Mild right convex curvature centered at T5-T6.  Minimal left convex curvature centered at L1-L2 and right convex curvature  centered at L3-L4.  Minimal retrolisthesis of L1 on L2, L2 and L3, and L3-L4. Bone marrow / fracture: Multiple thoracic compression fractures as detailed below (level, approximate degree of height loss): -T1, 10% -T3, 20% -T4, 20% -T6, 30% -T9, 20% -T10, 20% -T11, 40% with mild associated retropulsion and canal narrowing No fracture in the lumbar spine.  No aggressive osseous lesions. Canal and foramina:  Multilevel degenerative changes in the thoracolumbar spine most pronounced at L2-L3 where there is severe disc height loss and a posterior disc osteophyte complex which mildly narrows the central canal.  Multilevel facet arthropathy.  No high-grade canal or foraminal narrowing is seen. Paraspinal and thoracoabdominal soft tissues:   The paraspinal soft tissues planes are maintained.  Bilateral nonobstructing calculi measuring up to 0.4 cm on the right.  Diffuse atherosclerotic calcifications, including of the coronary arteries.  Normal caliber aorta.  Aortic valve leaflet calcifications. Pelvic bones:    The visualized sacrum and iliac wings are within normal limits.  The presacral soft tissues are normal in appearance.  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  Mild bilateral sacroiliac joint osteoarthritis.    IMPRESSION: Multiple age indeterminate thoracic compression fractures, as detailed. Anatomic Thoracic/Lumbar Variant: None.  L4-5 is considered the level of the iliac crest and assume there are 5 lumbar-type vertebrae. : DAGOBERTO   Transcribe Date/Time: Apr 14 2024  4:10P Dictated by : PARISH GUAMAN MD This examination was interpreted and the report reviewed and electronically signed by: PARISH GUAMAN MD on Apr 14 2024  4:23PM  EST    XR knee left 1-2 views    Result Date: 4/14/2024  * * *Final Report* * * DATE OF EXAM: Apr 14 2024  3:33PM   [facility]   [id number]  -  XR KNEE 2V AP/LAT LT  / ACCESSION #  [id number] PROCEDURE REASON: Trauma      * * * * Physician Interpretation  * * * *  EXAM:XR KNEE 2V AP/LAT LT, XR KNEE 2V AP/LAT RT HISTORY: Trauma COMPARISON:None    IMPRESSION:There is no fracture or dislocation. There are tiny osteophytes at the tibial plateau and patella bilaterally. There is no joint effusion or focal soft tissue swelling. There is mild diffuse osteopenia. : [facility]    Transcribe Date/Time: Apr 14 2024  4:03P Dictated by : LYNDON MOSS MD This examination was interpreted and the report reviewed and electronically signed by: LYNDON MOSS MD on Apr 14 2024  4:04PM  EST    XR knee right 1-2 views    Result Date: 4/14/2024  * * *Final Report* * * DATE OF EXAM: Apr 14 2024  3:33PM   [facility]   [id number]  -  XR KNEE 2V AP/LAT RT  / ACCESSION #  [id number] PROCEDURE REASON: Trauma      * * * * Physician Interpretation * * * *  EXAM:XR KNEE 2V AP/LAT LT, XR KNEE 2V AP/LAT RT HISTORY: Trauma COMPARISON:None    IMPRESSION:There is no fracture or dislocation. There are tiny osteophytes at the tibial plateau and patella bilaterally. There is no joint effusion or focal soft tissue swelling. There is mild diffuse osteopenia. : [facility]    Transcribe Date/Time: Apr 14 2024  4:03P Dictated by : LYNDON MOSS MD This examination was interpreted and the report reviewed and electronically signed by: LYNDON MOSS MD on Apr 14 2024  4:04PM  EST    XR chest 1 view    Result Date: 4/14/2024  * * *Final Report* * * DATE OF EXAM: Apr 14 2024  3:33PM   [facility]   [id number]  -  XR CHEST 1V FRONTAL PORT  / ACCESSION #  [id number] PROCEDURE REASON: Shortness of breath      * * * * Physician Interpretation * * * *  EXAMINATION:  CHEST RADIOGRAPH (PORTABLE SINGLE VIEW AP) Exam Date/Time:  4/14/2024 3:33 PM CLINICAL HISTORY: Shortness of breath MQ:  XCPR_5 Comparison:  None. RESULT: Lines, tubes, and devices:  There is a leadless pacemaker in the left lower chest. Lungs and pleura:  No evidence of infiltrate or edema.  The pleural margins appear normal. Cardiomediastinal silhouette:   Unremarkable cardiomediastinal silhouette. Other:  There are mild degenerative changes involving the shoulder joints with a loose body seen below the right shoulder joint.    IMPRESSION: Chronic changes as described above with no definite acute disease. : [facility]   Transcribe Date/Time: Apr 14 2024  3:45P Dictated by : TRICIA ELLIS MD This examination was interpreted and the report reviewed and electronically signed by: TRICIA ELLIS MD on Apr 14 2024  3:46PM  EST    CT cervical spine wo IV contrast    Result Date: 4/14/2024  * * *Final Report* * * DATE OF EXAM: Apr 14 2024  3:04PM   [facility]   0505  -  CT CERVICAL SPINE WO IVCON  / ACCESSION #  [id number] PROCEDURE REASON: Spine fracture, cervical, traumatic      * * * * Physician Interpretation * * * *  EXAMINATION:  CT BRAIN WO IVCON, CT CERVICAL SPINE WO IVCON CLINICAL HISTORY:  Status post fall TECHNIQUE:  Serial axial unenhanced images were obtained from the  vertex to the foramen magnum.  Spiral, high resolution axial unenhanced images were obtained from the skull base to the cervicothoracic junction with sagittal and coronal planar reconstructions. MQ:  CTBCSWO_3 Dose-Length Product (DLP): 1176 mGy*cm. CT Dose Reduction Employed: Automated exposure control(AEC) and iterative recon COMPARISON:  None. RESULT: BRAIN: Acute change:   No evidence of an acute contusion or other acute parenchymal process. Hemorrhage:    No evidence of acute intracranial hemorrhage. Mass lesion / Mass effect:   There is no evidence of an intracranial mass or extraaxial fluid collection.  No significant mass effect. Chronic change:   Scattered patchy foci of low attenuation are present within supratentorial white matter which is a nonspecific finding but likely represents mild microvascular ischemia. Parenchyma:  There is moderate generalized volume loss. Ventricles:   Ventricular enlargement concordant with the degree of parenchymal volume loss. Paranasal sinuses and  skull base:  The visualized paranasal sinuses are grossly clear.  The skull base and visualized extracranial soft tissues are grossly normal. CERVICAL: Counting reference:  Craniocervical junction.   Anatomic Variants:  None. Alignment:    Alignment is anatomic. Craniocervical junction:    Craniocervical junction is normal. Osseous structures/fracture:    No evidence of a lytic or blastic process in the visualized spine.  No evidence of acute or chronic fracture. Cervical soft tissues:    The paraspinal soft tissues planes are maintained. Degenerative changes: There is moderate to severe anterior osteophytosis, intervertebral disc space loss, endplate sclerosis and uncovertebral hypertrophy of the lower cervical spine, worse at C5-6 and C6-7. Others: Images of the lung apices demonstrate patchy biapical fibrosis.    IMPRESSION: NO CT EVIDENCE OF ACUTE INTRACRANIAL PROCESS. NO CT EVIDENCE OF AN ACUTE CERVICAL SPINE FRACTURE. Anatomic Variant:  None.  Assume 7 cervical vertebrae with counting from the craniocervical junction. : DAGOBERTO   Transcribe Date/Time: Apr 14 2024  3:27P Dictated by : KRISTIN NARVAEZ MD This examination was interpreted and the report reviewed and electronically signed by: KRISTIN NARVAEZ MD on Apr 14 2024  3:33PM  EST    CT head wo IV contrast    Result Date: 4/14/2024  * * *Final Report* * * DATE OF EXAM: Apr 14 2024  3:04PM   [facility]   0504  -  CT BRAIN WO IVCON   / ACCESSION #  [id number] PROCEDURE REASON: Head trauma, moderate-severe      * * * * Physician Interpretation * * * *  EXAMINATION:  CT BRAIN WO IVCON, CT CERVICAL SPINE WO IVCON CLINICAL HISTORY:  Status post fall TECHNIQUE:  Serial axial unenhanced images were obtained from the  vertex to the foramen magnum.  Spiral, high resolution axial unenhanced images were obtained from the skull base to the cervicothoracic junction with sagittal and coronal planar reconstructions. MQ:  CTBCSWO_3 Dose-Length Product (DLP): 1176 mGy*cm.  CT Dose Reduction Employed: Automated exposure control(AEC) and iterative recon COMPARISON:  None.  RESULT: BRAIN: Acute change:   No evidence of an acute contusion or other acute parenchymal process. Hemorrhage:    No evidence of acute intracranial hemorrhage. Mass lesion / Mass effect:   There is no evidence of an intracranial mass or extraaxial fluid collection.  No significant mass effect. Chronic change:   Scattered patchy foci of low attenuation are present within supratentorial white matter which is a nonspecific finding but likely represents mild microvascular ischemia. Parenchyma:  There is moderate generalized volume loss. Ventricles:   Ventricular enlargement concordant with the degree of parenchymal volume loss. Paranasal sinuses and skull base:  The visualized paranasal sinuses are grossly clear.  The skull base and visualized extracranial soft tissues are grossly normal. CERVICAL: Counting reference:  Craniocervical junction.   Anatomic Variants:  None. Alignment:    Alignment is anatomic. Craniocervical junction:    Craniocervical junction is normal. Osseous structures/fracture:    No evidence of a lytic or blastic process in the visualized spine.  No evidence of acute or chronic fracture. Cervical soft tissues:    The paraspinal soft tissues planes are maintained.  Degenerative changes: There is moderate to severe anterior osteophytosis, intervertebral disc space loss, endplate sclerosis and uncovertebral hypertrophy of the lower cervical spine, worse at C5-6 and C6-7. Others: Images of the lung apices demonstrate patchy biapical fibrosis.    IMPRESSION: NO CT EVIDENCE OF ACUTE INTRACRANIAL PROCESS. NO CT EVIDENCE OF AN ACUTE CERVICAL SPINE FRACTURE. Anatomic Variant:  None.  Assume 7 cervical vertebrae with counting from the craniocervical junction. : DAGOBERTO   Transcribe Date/Time: Apr 14 2024  3:27P Dictated by : KRISTIN NARVAEZ MD This examination was interpreted and the report  reviewed and electronically signed by: KRISTIN NARVAEZ MD on Apr 14 2024  3:33PM  EST   Results for orders placed or performed during the hospital encounter of 05/02/24 (from the past 24 hour(s))   Comprehensive metabolic panel   Result Value Ref Range    Glucose 89 74 - 99 mg/dL    Sodium 142 136 - 145 mmol/L    Potassium 3.6 3.5 - 5.3 mmol/L    Chloride 108 (H) 98 - 107 mmol/L    Bicarbonate 27 21 - 32 mmol/L    Anion Gap 11 10 - 20 mmol/L    Urea Nitrogen 26 (H) 6 - 23 mg/dL    Creatinine 0.62 0.50 - 1.05 mg/dL    eGFR >90 >60 mL/min/1.73m*2    Calcium 9.3 8.6 - 10.3 mg/dL    Albumin 3.9 3.4 - 5.0 g/dL    Alkaline Phosphatase 67 33 - 136 U/L    Total Protein 6.6 6.4 - 8.2 g/dL    AST 27 9 - 39 U/L    Bilirubin, Total 0.3 0.0 - 1.2 mg/dL    ALT 21 7 - 45 U/L   TSH with reflex to Free T4 if abnormal   Result Value Ref Range    Thyroid Stimulating Hormone 5.42 (H) 0.44 - 3.98 mIU/L   Ammonia   Result Value Ref Range    Ammonia 11 (L) 16 - 53 umol/L   Troponin I, High Sensitivity   Result Value Ref Range    Troponin I, High Sensitivity 5 0 - 13 ng/L   CBC and Auto Differential   Result Value Ref Range    WBC 3.9 (L) 4.4 - 11.3 x10*3/uL    nRBC 0.0 0.0 - 0.0 /100 WBCs    RBC 3.65 (L) 4.00 - 5.20 x10*6/uL    Hemoglobin 11.8 (L) 12.0 - 16.0 g/dL    Hematocrit 37.0 36.0 - 46.0 %     (H) 80 - 100 fL    MCH 32.3 26.0 - 34.0 pg    MCHC 31.9 (L) 32.0 - 36.0 g/dL    RDW 13.0 11.5 - 14.5 %    Platelets 201 150 - 450 x10*3/uL    Neutrophils % 50.1 40.0 - 80.0 %    Immature Granulocytes %, Automated 0.3 0.0 - 0.9 %    Lymphocytes % 36.7 13.0 - 44.0 %    Monocytes % 10.0 2.0 - 10.0 %    Eosinophils % 2.6 0.0 - 6.0 %    Basophils % 0.3 0.0 - 2.0 %    Neutrophils Absolute 1.96 1.60 - 5.50 x10*3/uL    Immature Granulocytes Absolute, Automated 0.01 0.00 - 0.50 x10*3/uL    Lymphocytes Absolute 1.43 0.80 - 3.00 x10*3/uL    Monocytes Absolute 0.39 0.05 - 0.80 x10*3/uL    Eosinophils Absolute 0.10 0.00 - 0.40 x10*3/uL    Basophils  Absolute 0.01 0.00 - 0.10 x10*3/uL   Creatine Kinase   Result Value Ref Range    Creatine Kinase 25 0 - 215 U/L   Protime-INR   Result Value Ref Range    Protime 10.8 9.8 - 12.8 seconds    INR 1.0 0.9 - 1.1   Thyroxine, Free   Result Value Ref Range    Thyroxine, Free 0.76 0.61 - 1.12 ng/dL          Assessment/Plan   Active Problems:    Anxiety    Weakness    Recurrent major depressive disorder ([facility])    Urinary incontinence      Patient presents to this facility with concern for increased weakness and altered mental status per family.  Patient does not endorse confusion personally, but does report feeling of prolonged sadness and depression as a relates to multiple life stressors.  Recent hospital admission for similar symptoms at which time patient was discharged with home physical therapy, outpatient neurology follow-up.    Currently awaiting results of urinalysis.  Patient endorses urinary incontinence alone which per records described as a chronic issue.  Denies dysuria, increased urinary frequency, or other concerning changes in her urinary habits.  Will be hesitant to treat asymptomatic bacteriuria however should UA appear grossly abnormal, would consider at that time.    Inpatient consult to psychiatry service with recurrent major depressive disorder, signs of active depression with flat affect, lethargy, weight loss.  Multiple life stressors noted including terminal illness of a close friend.    Patient is on at least 3 sedating controlled substances including Xanax, tramadol, and Ambien.  Believe any 1 of these medications, let alone a combination of all 3 will predispose her to weakness, confusion, lethargy, and altered mental status.  These medications should be held indefinitely and less sedating/mood altering alternatives would be recommended.  Patient has been reluctant to discontinue these medications in the past.    Fall precautions pending PT/OT assessment    Normal saline at 75 cc/h x 13  hours    Cardiac/low-salt diet     I spent >75 minutes in the professional and overall care of this patient.      Refugio Guevara, DO

## 2024-05-03 NOTE — PROGRESS NOTES
"Theodora Goel is a 78 y.o. female on day 0 of admission presenting with Weakness.    Subjective   Patient seen and examined at bedside.  Patient's son is also at bedside.  Patient reports she is doing okay today denies acute complaints.  She does report she has a headache.       Objective     Physical Exam    Constitutional: Well developed, no distress, alert and cooperative  Skin: Warm and dry  Eyes: EOMI, clear sclera  ENMT: mucous membranes moist  Respiratory: Patent airways, CTAB  Cardiovascular: Regular, rate and rhythm  Abdominal: Nondistended, soft, non-tender, +BS  MSK: ROM intact  Neuro: alert and oriented x3    Last Recorded Vitals  Blood pressure (!) 191/89, pulse 84, temperature 36 °C (96.8 °F), temperature source Temporal, resp. rate 16, height 1.676 m (5' 5.98\"), weight 72.8 kg (160 lb 7.9 oz), SpO2 95%.  Intake/Output last 3 Shifts:  I/O last 3 completed shifts:  In: 120 (1.6 mL/kg) [P.O.:120]  Out: - (0 mL/kg)   Weight: 72.8 kg     Relevant Results  Scheduled medications  aspirin, 81 mg, oral, Daily  buPROPion XL, 150 mg, oral, BID  cefTRIAXone, 1 g, intravenous, q24h  hydrALAZINE, 5 mg, intravenous, Once  pantoprazole, 40 mg, oral, Daily before breakfast   Or  pantoprazole, 40 mg, intravenous, Daily before breakfast  rosuvastatin, 20 mg, oral, Nightly  valsartan, 80 mg, oral, Daily      Continuous medications     PRN medications  PRN medications: acetaminophen **OR** acetaminophen **OR** acetaminophen, acetaminophen **OR** acetaminophen **OR** acetaminophen, melatonin, ondansetron ODT **OR** ondansetron, polyethylene glycol  Results from last 7 days   Lab Units 05/03/24  0624 05/02/24  1825   WBC AUTO x10*3/uL 3.5* 3.9*   RBC AUTO x10*6/uL 3.11* 3.65*   HEMOGLOBIN g/dL 10.0* 11.8*     Results from last 7 days   Lab Units 05/03/24  0624 05/02/24  1825   SODIUM mmol/L 143 142   POTASSIUM mmol/L 3.2* 3.6   CHLORIDE mmol/L 109* 108*   CO2 mmol/L 26 27   BUN mg/dL 19 26*   CREATININE mg/dL 0.50 " 0.62   CALCIUM mg/dL 8.9 9.3   BILIRUBIN TOTAL mg/dL 0.4 0.3   ALT U/L 17 21   AST U/L 21 27       ECG 12 Lead    Result Date: 5/3/2024  Sinus rhythm with 1st degree AV block Left axis deviation Abnormal ECG When compared with ECG of 26-JUL-2013 10:06, [place] interval has increased    XR chest 1 view    Result Date: 5/2/2024  STUDY: Chest Radiograph;  05/02/2024, 6:55 PM. INDICATION: Altered mental status. COMPARISON: CXR: 01/08/24, 06/24/22, 01/28/22. ACCESSION NUMBER(S): [id number] ORDERING CLINICIAN: WILLIAN MEAD TECHNIQUE:  Frontal chest was obtained at 18:55 hours. FINDINGS: CARDIOMEDIASTINAL SILHOUETTE: Cardiomediastinal silhouette is mildly enlarged but without significant change considering differences in technique and depth of inspiration lower on the current study.  Heart size likely upper limits of recorder device projecting over the left side of the heart.  LUNGS: Relatively low depth of inspiration without focal consolidation or pulmonary edema.  No pleural effusions or pneumothorax.  ABDOMEN: No remarkable upper abdominal findings.  BONES: No acute osseous changes.  Degenerative changes of the glenohumeral joints, and associated ossified loose bodies.  Mild thoracic scoliosis.    No acute pulmonary process. Signed by Shyam Cruz DO    CT head wo IV contrast    Result Date: 5/2/2024  Interpreted By:  Edward Subramanian, STUDY: CT HEAD WO IV CONTRAST;  5/2/2024 7:02 pm   INDICATION: Signs/Symptoms:frequent falls generalized weakness.   COMPARISON: None.   ACCESSION NUMBER(S): [id number]   ORDERING CLINICIAN: ALEJANDRA CROSS   TECHNIQUE: Noncontrast axial CT images of head were obtained with coronal and sagittal reconstructed images.   FINDINGS: BRAIN PARENCHYMA:  No acute intraparenchymal hemorrhage or parenchymal evidence of acute large territory ischemic infarct. No mass-effect. Gray-white matter distinction is preserved.   VENTRICLES and EXTRA-AXIAL SPACES:  No acute extra-axial or intraventricular  hemorrhage. No effacement of cerebral sulci. Ventricles and sulci are age-concordant.   PARANASAL SINUSES/MASTOIDS:  No hemorrhage or air-fluid levels within the visualized paranasal sinuses. The mastoids are well aerated.   CALVARIUM/ORBITS:  No skull fracture.  The orbits and globes are intact to the extent visualized.   EXTRACRANIAL SOFT TISSUES: No discernible abnormality.       1. No acute intracranial abnormality.         MACRO: None.   Signed by: Edward Subramanian 5/2/2024 7:25 PM Dictation workstation:   [id number]      Assessment/Plan   Principal Problem:    Weakness  Active Problems:    Anxiety    Recurrent major depressive disorder (CMS-HCC)    Urinary incontinence    -wbc 3.5  -UA +   -f/u Ucx   -start rocephin  -oriented x3, family concerned for worsening depression, ?underlying dementia   -psych consulted   -palliative consulted   -holding home tramadol and ambien for now   -PT/OT  -continue home meds     FENGI   -encourage oral hydration   -replete electrolytes PRN  -cardiac diet   -GI ppx: ppi  -DVT ppx: SCDs    Dispo: This is a 78-year-old female who presented with reported confusion and concern for urinary tract infection.  UA was mildly positive and patient was started on antibiotics.  Family also reports concern for depression.  Psychiatry and palliative care consulted.  PT OT.  Will likely stay greater than 2 midnights.    This note is created using voice recognition software. All efforts are made to minimize errors, if there are errors there due to transcription.    Shraddha Gupta,   Hospitalist

## 2024-05-03 NOTE — PROGRESS NOTES
05/03/24 1531   Discharge Planning   Living Arrangements Alone   Support Systems Children;Family members   Type of Residence Private residence   Number of Stairs to Enter Residence 0   Number of Stairs Within Residence 15  (has chair lift to bedrooms)   Home or Post Acute Services In home services     Met with pt with son and daughter-in-law at bedside.  Pt gave permission to speak with them present. Pt is declining SNF/Rehab placement.  This worker discussed Passport programs however, pt does not qualify for Medicaid program.  Discussed private duty health care services and provided list of Private Home Care Agencies.  Also discussed benefits of a personal emergency response unit for home setting as well.      Pt shared that the home is handicapped equipped as it was her parents home and there is a chair lift, walk in shower with grab bars and tub bench. Pt has a chair climber on steps to get upstairs to bedrooms.  Pt son and daughter-in-law appear very supportive and involved.     4:50pm  Met with pt son Yoav again and he states the pt has changed her mind and now wants SNF/Rehab placement.  Wife has list at home however, he does know that their first choice would be [facility].  Referral sent to [facility] to la and send.

## 2024-05-03 NOTE — PROGRESS NOTES
Physical Therapy    Physical Therapy Evaluation    Patient Name: Theodora Goel  MRN: [id number]  Today's Date: 5/3/2024   Time Calculation  Start Time: 1139  Stop Time: 1204  Time Calculation (min): 25 min  3133  Assessment/Plan   PT Assessment  PT Assessment Results: Decreased strength, Decreased range of motion, Decreased endurance, Impaired balance, Decreased mobility, Decreased coordination, Impaired judgement, Pain  Rehab Prognosis: Fair  End of Session Communication: Bedside nurse  End of Session Patient Position: Up in chair, Alarm on  IP OR SWING BED PT PLAN  Inpatient or Swing Bed: Inpatient  PT Plan  Treatment/Interventions: Bed mobility, Transfer training, Gait training, Stair training, Balance training, Neuromuscular re-education, Strengthening, Endurance training, Range of motion, Therapeutic exercise, Therapeutic activity, Home exercise program  PT Plan: Skilled PT  PT Frequency: 3 times per week  PT Discharge Recommendations: Moderate intensity level of continued care  Equipment Recommended upon Discharge: Wheeled walker  PT Recommended Transfer Status: Assist x2, Assistive device  PT - OK to Discharge: Yes - To next level of care when cleared by medical team    Pt demonstrates mod A for bed mobility, transfers, and side stepping to chair/bedside commode.  At this time, pt appears to be below their baseline function and would benefit from skilled therapy while still in hospital to ensure safety, improvements in strength/mobility, and decrease risk of falls. Once stable enough for hospital discharge, pt would benefit from moderate intensity skilled PT and use of a FWW.       Subjective   Per EMR:  Theodora Goel is a 78 y.o. female presenting with family members at bedside who endorse increasing fall frequency as well as increased confusion.  Of note, patient was recently admitted and discharged to an outside facility for similar symptoms.  At that time, it was believed that patient's  symptoms were related to dehydration, transient hypotension, as well as the use of multiple sedating medications including tramadol, Xanax, as well as Ambien.  It was recommended that patient discontinue these medications however per report, they have not been discontinued.  Patient has PT/OT set up following hospital discharge.  Per report, had outpatient neurology appointment scheduled but declined going.      She continues to endorse weakness and reports episodes of urinary incontinence.  Denies fecal incontinence.  Denies any other associated symptoms.  Does endorse feeling sad and overwhelmed with a friend's terminal illness.  No suicidal thoughts.  Additional laboratory studies unremarkable.  Urinalysis currently pending.    On arrival, pt is supine in bed. She is eager to try to stand per son and daughter in law. Pt is in no apparent distress and is agreeable to therapy.     Current Problem:  Patient Active Problem List   Diagnosis    Abnormal CBC    Abnormal ECG    Anxiety    Asthma ([facility])    Atypical chest pain    Atypical mole    Balance problem    Coronary artery disease    Weakness    Eczema    Seborrheic dermatitis    Essential hypertension    Frequent falls    Gallstones    GERD (gastroesophageal reflux disease)    Hyperlipidemia    Hypomagnesemia    Hypothyroidism, adult    Insomnia    Other dysphagia    Osteopenia of left femoral neck    Left lumbar radiculopathy    Low back pain    Low hemoglobin    Magnesium deficiency    Mood disorder ([facility])    Palpitations    Paroxysmal atrial fibrillation (Multi)    Postural dizziness    Primary osteoarthritis of knees, bilateral    Raynaud's phenomenon    Recurrent major depressive disorder (CMS-HCC)    VARGAS (dyspnea on exertion)    Shortness of breath    Spine degeneration    T11 vertebral fracture (Multi)    Tinnitus    Vitamin B12 deficiency    Vitamin D deficiency    Weight loss    Long term (current) use of opiate analgesic    Medication management     Dizziness    Unsteady    TIA (transient ischemic attack)    Paroxysmal SVT (supraventricular tachycardia) (CMS-HCC)    Orthostatic hypotension    Urinary incontinence       General Visit Information:  General  Reason for Referral: impaired mobility  Referred By: Dr. Chrissy Gupta  Past Medical History Relevant to Rehab: HTN, AFib, frequent falls, OA  Family/Caregiver Present: Yes  Co-Treatment: OT  Prior to Session Communication: Bedside nurse  Patient Position Received: Bed, 3 rail up, Alarm on    Home Living/PLOF: obtained per EMR and family at bedside  Patient lives in a house alone. She has no steps to enter. She has 6 steps then a landing and another 6 steps to bedroom and bath. She has a walk in shower with grab bars and a seat. She does have a lift chair feature on the flight of stairs to the second level. She does not use an assistive device normally but has been since the middle of April when she was DC from [facility]. Son or home health services help with laundry, shopping, cleaning. Her family states she had a home health agency coming to her home for therapy.       Precautions:  Precautions  Medical Precautions: Fall precautions       Objective     Pain:  Pain Assessment  Pain Assessment: 0-10  Pain Score:  (does not rate but states she has chronic L knee pain)  Pain Type: Chronic pain  Pain Location: Knee  Pain Orientation: Left  Pain Interventions: Ambulation/increased activity, Repositioned    Cognition:  Cognition  Orientation Level:  (did not ask orientation questions as once in room, family and her stated that she wants to get out of bed; appears WFL at this point as evidenced by engaging in conversation without obvious misunderstanding)    General Assessments:      Activity Tolerance  Endurance: Tolerates 10 - 20 min exercise with multiple rests                 Static Sitting Balance  Static Sitting-Comment/Number of Minutes: good  Dynamic Sitting Balance  Dynamic Sitting-Comments:  fair plus  Static Standing Balance  Static Standing-Comment/Number of Minutes: fair  Dynamic Standing Balance  Dynamic Standing-Comments: fair minus    Functional Assessments:  Bed mobility  Supine to sit: mod A x2; able to move BLE toward EOB but needed assistance with bringing them fully off of the bed; unable to use UE to push and required HHA as railing with additional assistance to trunk to bring to seated; verbally cued to engage trunk muscles to remain upright   Sit to supine: NA as patient was seated in bedside chair at end of session    Transfers  Sit to stand: from normal bed height, cued to push up from bed but continued to reach for FWW; mod A x2 with retro/R trunk lean with most WB through B heels, cued to bring trunk/chest forward for better balance   Stand to sit: onto bedside commode, mod A x2 with cues for side stepping, required increased time and effort with cues to reach for rails and did so with R hand only   Bedside commmode to chair: mod A x2 for side stepping/pivoting, continued to use FWW for stability rather than pushing up from rails; once in seated, difficulty scooting buttocks fully back, required dependent scoot with sheet on chair   Toilet tx: dependent pericare on C    Ambulation   Ambulation held secondary to decreased ability to side step without significant assistance and poor dynamic standing balance       Extremity/Trunk Assessments:  RLE strength grossly 4/5  LLE strength grossly 4-/5, decreased ability to hold resistance with knee extension   B ankles ROM WFL    Outcome Measures:  [facility] Basic Mobility  Turning from your back to your side while in a flat bed without using bedrails: A lot  Moving from lying on your back to sitting on the side of a flat bed without using bedrails: A lot  Moving to and from bed to chair (including a wheelchair): A lot  Standing up from a chair using your arms (e.g. wheelchair or bedside chair): A lot  To walk in hospital room: A lot  Climbing 3-5  steps with railing: A lot  Basic Mobility - Total Score: 12                            Goals:  Encounter Problems       Encounter Problems (Active)       PT Problem       Pt will demonstrate ability to perform all bed mobility tasks with SBA.  (Progressing)       Start:  05/03/24    Expected End:  05/17/24            Pt will be able to perform sit to stand transfer with CGA and min verbal and/or tactile cueing for increased functional independence.   (Progressing)       Start:  05/03/24    Expected End:  05/17/24            Pt will be able to ambulate 50 ft with LRAD and min A for increased functional mobility.   (Progressing)       Start:  05/03/24    Expected End:  05/17/24            Pt will demonstrate fair plus static and dynamic standing balance with CGA for increased ability to complete functional tasks and ADLs.  (Progressing)       Start:  05/03/24    Expected End:  05/17/24                   Education Documentation  Body Mechanics, taught by GEORGINA Suh at 5/3/2024 12:51 PM.  Learner: Family, Patient  Readiness: Acceptance  Method: Explanation  Response: Needs Reinforcement, Verbalizes Understanding    Home Exercise Program, taught by GEORGINA Suh at 5/3/2024 12:51 PM.  Learner: Family, Patient  Readiness: Acceptance  Method: Explanation  Response: Needs Reinforcement, Verbalizes Understanding    Mobility Training, taught by GEORGINA Suh at 5/3/2024 12:51 PM.  Learner: Family, Patient  Readiness: Acceptance  Method: Explanation  Response: Needs Reinforcement, Verbalizes Understanding    Education Comments  No comments found.

## 2024-05-03 NOTE — CONSULTS
Reason For Consult  Goals of care   History Of Present Illness  Theodora Goel is a 78 y.o. female presenting with increasing fall frequency as well as increased confusion.      Past Medical History  She has a past medical history of Asymptomatic menopausal state, Encounter for other screening for malignant neoplasm of breast (06/24/2022), Encounter for screening for malignant neoplasm of colon (06/24/2022), Essential (primary) hypertension, History of falling (06/24/2022), Old myocardial infarction, Other conditions influencing health status, Other dysphagia (01/28/2022), Palpitations (06/02/2022), Personal history of diseases of the skin and subcutaneous tissue, Personal history of diseases of the skin and subcutaneous tissue (06/18/2021), Personal history of other diseases of the circulatory system (11/03/2015), Personal history of other diseases of the musculoskeletal system and connective tissue, Personal history of other diseases of urinary system, Personal history of other endocrine, nutritional and metabolic disease, Personal history of other mental and behavioral disorders (06/18/2021), Personal history of other specified conditions, Personal history of other specified conditions, Personal history of other specified conditions, Polyp of corpus uteri, Tachycardia, unspecified, and Unspecified asthma with (acute) exacerbation ([facility]).       Assessment/Plan   Pt asleep, resting comfortably in bed. SonYoav at bedside. Introduced self and palliative care. Yoav states that the goal is for the pt to return home, however pt can no longer live alone and family is interested in options to assist. We discussed C with palliative care services vs Hospice care in the home setting. Pt does not seem to have hospice diagnosis code at this time, however discussed with attending that psych is consulted to weigh in and advanced dementia may be determined. Discussed plan for psych to evaluate pt and palliative to  follow up with decision for either HHC and palliative v hospice. Attending at bedside and in agreement with plan of care.    Will follow        Mavis Brooks RN

## 2024-05-03 NOTE — PROGRESS NOTES
Nutrition Initial Assessment:   Nutrition Assessment    Reason for Assessment: Admission nursing screening    Patient is a 78 y.o. female presenting with family members at bedside who endorse increasing fall frequency as well as increased confusion.     Past Medical History:   Diagnosis Date    Asymptomatic menopausal state     Menopause    Encounter for other screening for malignant neoplasm of breast 06/24/2022    Breast screening    Encounter for screening for malignant neoplasm of colon 06/24/2022    Colon cancer screening    Essential (primary) hypertension     Hypertension, benign    History of falling 06/24/2022    H/O fall    Old myocardial infarction     History of myocardial infarction    Other conditions influencing health status     No significant past surgical history    Other dysphagia 01/28/2022    Other dysphagia    Palpitations 06/02/2022    Palpitations    Personal history of diseases of the skin and subcutaneous tissue     History of alopecia    Personal history of diseases of the skin and subcutaneous tissue 06/18/2021    History of eczema    Personal history of other diseases of the circulatory system 11/03/2015    History of supraventricular tachycardia    Personal history of other diseases of the musculoskeletal system and connective tissue     History of osteoarthritis    Personal history of other diseases of urinary system     History of renal failure    Personal history of other endocrine, nutritional and metabolic disease     History of hypothyroidism    Personal history of other mental and behavioral disorders 06/18/2021    History of anxiety    Personal history of other specified conditions     History of prolonged Q-T interval on ECG    Personal history of other specified conditions     History of shortness of breath    Personal history of other specified conditions     History of fatigue    Polyp of corpus uteri     Uterine polyp    Tachycardia, unspecified     Wide-complex tachycardia  "   Unspecified asthma with (acute) exacerbation ([facility])     Asthma exacerbation     Past Surgical History:   Procedure Laterality Date    NOSE SURGERY  11/02/2018    Nose Surgery    OTHER SURGICAL HISTORY  11/19/2021    Loop recorder insertion    OTHER SURGICAL HISTORY  01/28/2022    Cardioverter defibrillator insertion         Nutrition History:  Energy Intake:  (Pt has not eaten anything since admit.)  Food and Nutrient History: Son and his spouse at bedside and explain that pt is typically very independent. She prefers to eat convenient and easy to prepare foods - things she can \"grab and go\". Her son has been visiting more often to help her. He says she has a great appetite generally and is not a picky eater. She has boost/ensure at the house and is not against drinking it, but they are not sure they have ever seen her drink it. She seems to just not know to eat and get lose track and not eat.  Vitamin/Herbal Supplement Use: None in chart.  Food Allergies/Intolerances:  None  GI Symptoms: None  Oral Problems: None   Pain Score: 0 - No pain      Anthropometrics:  Height: 167.6 cm (5' 5.98\")   Weight: 72.8 kg (160 lb 7.9 oz)   BMI (Calculated): 25.92  IBW/kg (Dietitian Calculated): 58.93 kg  Percent of IBW: 123.53 %       Weight History:   Wt Readings from Last 10 Encounters:   05/02/24 72.8 kg (160 lb 7.9 oz)   12/04/23 70.9 kg (156 lb 6.4 oz)   01/08/24 70.8 kg (156 lb)   11/14/23 64.9 kg (143 lb)   08/16/23 73 kg (161 lb)   07/20/23 70.8 kg (156 lb)   04/28/23 70.3 kg (155 lb)   03/17/23 70.8 kg (156 lb)   12/15/22 74.8 kg (165 lb)   08/31/22 70.3 kg (155 lb)       Weight Change %:  Significant Weight Loss: No    Nutrition Focused Physical Exam Findings:  defer: pt sleeping  Subcutaneous Fat Loss:      Muscle Wasting:     Edema:  Edema: +1 trace  Edema Location: bilateral lowerextremities  Physical Findings:  Skin: Negative (Skin intact.)    Nutrition Significant Labs:  CBC Trend:   Results from last 7 days "   Lab Units 05/03/24  0624 05/02/24  1825   WBC AUTO x10*3/uL 3.5* 3.9*   RBC AUTO x10*6/uL 3.11* 3.65*   HEMOGLOBIN g/dL 10.0* 11.8*   HEMATOCRIT % 32.0* 37.0   MCV fL 103* 101*   PLATELETS AUTO x10*3/uL 175 201    , BMP Trend:   Results from last 7 days   Lab Units 05/03/24  0624 05/02/24  1825   GLUCOSE mg/dL 104* 89   CALCIUM mg/dL 8.9 9.3   SODIUM mmol/L 143 142   POTASSIUM mmol/L 3.2* 3.6   CO2 mmol/L 26 27   CHLORIDE mmol/L 109* 108*   BUN mg/dL 19 26*   CREATININE mg/dL 0.50 0.62        Nutrition Specific Medications:  Reviewed.    I/O:   Last BM Date: 05/02/24;      Dietary Orders (From admission, onward)       Start     Ordered    05/02/24 2307  Adult diet 2-3 grams sodium  Diet effective now        Question:  Diet type  Answer:  2-3 grams sodium    05/02/24 2306                     Estimated Needs:   Total Energy Estimated Needs (kCal):  (1591 kcal (MSJ x 1.3 x 1.0) or 22kcal/kg)     Total Protein Estimated Needs (g):  (73g protein (1.0g/kg))     Total Fluid Estimated Needs (mL):  (1591mL (1mL/kcal))           Nutrition Diagnosis   Malnutrition Diagnosis  Patient has Malnutrition Diagnosis: No    Nutrition Diagnosis  Patient has Nutrition Diagnosis: Yes  Diagnosis Status (1): New  Nutrition Diagnosis 1: Inadequate oral intake  Related to (1): disinterest/distraction - altered mental status  As Evidenced by (1): pt not eating on a consistent basis and suspected to be consuming <75% of estimated energy needs.       Nutrition Interventions/Recommendations         Nutrition Prescription:  Individualized Nutrition Prescription Provided for : Consider liberalizing diet to REGULAR (discontinue low sodium restrictions)        Nutrition Interventions:   Interventions: Meals and snacks, Medical food supplement  Meals and Snacks: General healthful diet  Goal: Pt to receive 3 meals daily with snacks as requested.  Medical Food Supplement: Commercial beverage  Goal: Supplement with Ensure Plus HP at meals as tolerated  (350kcal/20g protein per 8-ounces)         Nutrition Education:   Pt not appropriate for education at this time. Explained A[facility] menu ordering procedures with family at bedside and encouraged them to assist in meal planning.        Nutrition Monitoring and Evaluation   Food/Nutrient Related History Monitoring  Monitoring and Evaluation Plan: Amount of food  Amount of Food: Estimated amout of food  Criteria: Pt to consume >50% of 3 meals daily to provide >1500 kcal and >70g protein per day to meet estimated nutritional needs.    Body Composition/Growth/Weight History  Monitoring and Evaluation Plan: Weight  Weight: Measured weight  Criteria: Maintain weight                 Time Spent/Follow-up Reminder:   Time Spent (min): 30 minutes  Last Date of Nutrition Visit: 05/03/24  Nutrition Follow-Up Needed?: 3-8 days  Follow up Comment: DONNA

## 2024-05-03 NOTE — PROGRESS NOTES
Occupational Therapy    Occupational Therapy    Evaluation    Patient Name: Theodora Goel  MRN: [id number]  Today's Date: 5/3/2024  Time Calculation  Start Time: 1145  Stop Time: 1207  Time Calculation (min): 22 min  Rm: 3135    Assessment  IP OT Assessment  OT Assessment: Pt with flat affect but cooperative with therapy.  Prognosis: Good  End of Session Communication: Bedside nurse  End of Session Patient Position: Up in chair, Alarm on    Plan:  Treatment Interventions: ADL retraining, UE strengthening/ROM, Endurance training, Neuromuscular reeducation, Functional transfer training  OT Frequency: 3 times per week  OT Discharge Recommendations: Moderate intensity level of continued care (SNF)  Equipment Recommended upon Discharge: Wheeled walker  OT Recommended Transfer Status: Moderate assist, Assist of 2  OT - OK to Discharge: Yes (to next level of care)    Subjective     Current Problem:  1. Generalized weakness            General:  General  Reason for Referral: ADL's; Safety Assessment  Referred By: Dr. Chrissy Gutpa  Past Medical History Relevant to Rehab: HTN, AFib, frequent falls, OA  Family/Caregiver Present: Yes  Co-Treatment: OT  Prior to Session Communication: Bedside nurse  Patient Position Received: Bed, 3 rail up, Alarm on    Per EMR:  Theodora Goel is a 78 y.o. female presenting with family members at bedside who endorse increasing fall frequency as well as increased confusion.  Of note, patient was recently admitted and discharged to an outside facility for similar symptoms.  At that time, it was believed that patient's symptoms were related to dehydration, transient hypotension, as well as the use of multiple sedating medications including tramadol, Xanax, as well as Ambien.  It was recommended that patient discontinue these medications however per report, they have not been discontinued.  Patient has PT/OT set up following hospital discharge.  Per report, had outpatient neurology  appointment scheduled but declined going.      She continues to endorse weakness and reports episodes of urinary incontinence.  Denies fecal incontinence.  Denies any other associated symptoms.  Does endorse feeling sad and overwhelmed with a friend's terminal illness.  No suicidal thoughts.  Additional laboratory studies unremarkable.  Urinalysis currently pending.    Precautions:  Medical Precautions: Fall precautions    Pain:  Pain Assessment  Pain Assessment: 0-10  Pain Score:  (Pt did not rate pain, but complains of chronic L knee pain)    Objective     Cognition:  Orientation Level:  (did not ask orientation questions as once in room, family and her stated that she wants to get out of bed; appears WFL at this point as evidenced by engaging in conversation without obvious misunderstanding)    Home Living/PLOF: obtained per EMR and family at bedside  Patient lives in a house alone. She has no steps to enter. She has 6 steps then a landing and another 6 steps to bedroom and bath. She has a walk in shower with grab bars and a seat. She does have a lift chair feature on the flight of stairs to the second level. She does not use an assistive device normally but has been since the middle of April when she was DC from [facility]. Son or home health services help with laundry, shopping, cleaning. Her family states she had a home health agency coming to her home for therapy.    ADL:  Eating Assistance: Independent  Grooming Assistance: Stand by  Bathing Assistance: Moderate  UE Dressing Assistance: Minimal  LE Dressing Assistance: Moderate  Toileting Assistance with Device: Maximal    Activity Tolerance:  Endurance: Tolerates 10 - 20 min exercise with multiple rests    Functional Assessments:  Bed mobility  Supine to sit: mod A x2; able to move BLE toward EOB but needed assistance with bringing them fully off of the bed; unable to use UE to push and required HHA as railing with additional assistance to  trunk to bring to seated; verbally cued to engage trunk muscles to remain upright   Sit to supine: NA as patient was seated in bedside chair at end of session     Transfers  Sit to stand: from normal bed height, cued to push up from bed but continued to reach for FWW; mod A x2 with retro/R trunk lean with most WB through B heels, cued to bring trunk/chest forward for better balance   Stand to sit: onto bedside commode, mod A x2 with cues for side stepping, required increased time and effort with cues to reach for rails and did so with R hand only   Bedside commmode to chair: mod A x2 for side stepping/pivoting, continued to use FWW for stability rather than pushing up from rails; once in seated, difficulty scooting buttocks fully back, required dependent scoot with sheet on chair   Toilet tx: dependent pericare on BSC     Ambulation   Ambulation held secondary to decreased ability to side step without significant assistance and poor dynamic standing balance     Sitting Balance:  Static Sitting Balance  Static Sitting-Comment/Number of Minutes: fair  Dynamic Sitting Balance  Dynamic Sitting-Comments: fair-    Standing Balance:  Static Standing Balance  Static Standing-Comment/Number of Minutes: poor  Dynamic Standing Balance  Dynamic Standing-Comments: poor    Strength:  Strength Comments: ALVIN UE's WFL    Extremities: RUE   RUE : Within Functional Limits and LUE   LUE: Within Functional Limits    Outcome Measures: [facility] Daily Activity  Putting on and taking off regular lower body clothing: A lot  Bathing (including washing, rinsing, drying): A lot  Putting on and taking off regular upper body clothing: A lot  Toileting, which includes using toilet, bedpan or urinal: A lot  Taking care of personal grooming such as brushing teeth: A little  Eating Meals: A little  Daily Activity - Total Score: 14     EDUCATION:  Education  Individual(s) Educated: Patient  Education Provided:  (safety)  Education Documentation  Body  Mechanics, taught by Radha Putnam OT at 5/3/2024  3:28 PM.  Learner: Patient  Readiness: Acceptance  Method: Explanation  Response: Verbalizes Understanding, Needs Reinforcement    Precautions, taught by Radha Putnam OT at 5/3/2024  3:28 PM.  Learner: Patient  Readiness: Acceptance  Method: Explanation  Response: Verbalizes Understanding, Needs Reinforcement    Education Comments  No comments found.        Goals:   Encounter Problems       Encounter Problems (Active)       OT Goals       OT Goal 1 (Progressing)       Start:  05/03/24    Expected End:  05/17/24       Pt will complete all bed mobility with CGA safely            OT Goal 2 (Progressing)       Start:  05/03/24    Expected End:  05/17/24       Pt will complete ADL's and mobility with good sit balance and fair+ stand balance            OT Goal 3 (Progressing)       Start:  05/03/24    Expected End:  05/17/24       Pt with complete all transfers safely with Min A safely            OT Goal 4 (Progressing)       Start:  05/03/24    Expected End:  05/17/24       Pt will complete UB dressing ADL's with CGA using adaptive device(s) as needed           OT Goal 5 (Progressing)       Start:  05/03/24    Expected End:  05/17/24       Pt with complete grooming ADL's with supervision after setup                 Treatment: Pt was able to complete bed mobility with Mod A of 2. Pt with fair sit balance on EOB. Pt required Mod A of 2 to complete sit-stand to wheeled walker. Pt with decreased stand balance and increased posterior lean. Pt required Mod A to regain stand balance. Pt ambulated a few steps bed to BSC using wheeled walker with Mod A to complete transfer. Pt required Max A to complete toileting ADL's. Pt required Mod A to transfer off BSC. Pt ambulated a few steps bed to chair using walker with Mod A. Pt remained in chair with chair alarm on and call light within reach.

## 2024-05-03 NOTE — PROGRESS NOTES
05/03/24 1455   Discharge Planning   Living Arrangements Alone   Support Systems Family members   Assistance Needed yes   Type of Residence Private residence   Number of Stairs to Enter Residence 0   Number of Stairs Within Residence 15   Do you have animals or pets at home? No   Home or Post Acute Services In home services   Type of Home Care Services Home OT;Home PT;Home nursing visits;Home health aide   Patient expects to be discharged to: home [facility]   Does the patient need discharge transport arranged? No   Financial Resource Strain   How hard is it for you to pay for the very basics like food, housing, medical care, and heating? Not hard   Housing Stability   In the last 12 months, was there a time when you were not able to pay the mortgage or rent on time? N   In the last 12 months, how many places have you lived? 1   In the last 12 months, was there a time when you did not have a steady place to sleep or slept in a shelter (including now)? N   Transportation Needs   In the past 12 months, has lack of transportation kept you from medical appointments or from getting medications? no   In the past 12 months, has lack of transportation kept you from meetings, work, or from getting things needed for daily living? No     Met with patient and family at the bedside, verified demographics, pcp, and insurance. She uses a walker and stair lift in the home. She was driving up until a few weeks ago. She is able to purchase her medications, received education, and states she takes on time. Therapy's evaluation and recommendation is for a moderate intensity therapy level rehab at discharge with an ampac of 12. Patient is refusing to go for additional therapy and would prefer to go home at discharge with [facility] . Dr. Kim to follow for home care orders. The family is attempting to arrange for additional care givers in the home.  Spoke with the son outside the room and he is the POA . I provided a list of skilled  facilities to review.  Will continue to follow for discharge needs.     1500: Social Work consulted for additional resources.

## 2024-05-03 NOTE — CARE PLAN
The patient's goals for the shift include Remain hemodynamically stable    The clinical goals for the shift include r/o UTI    Problem: Pain  Goal: My pain/discomfort is manageable  Outcome: Progressing     Problem: Safety  Goal: Patient will be injury free during hospitalization  Outcome: Progressing  Goal: I will remain free of falls  Outcome: Progressing     Problem: Daily Care  Goal: Daily care needs are met  Outcome: Progressing     Problem: Psychosocial Needs  Goal: Demonstrates ability to cope with hospitalization/illness  Outcome: Progressing  Goal: Collaborate with me, my family, and caregiver to identify my specific goals  Outcome: Progressing     Problem: Discharge Barriers  Goal: My discharge needs are met  Outcome: Progressing     Problem: Fall/Injury  Goal: Not fall by end of shift  Outcome: Progressing  Goal: Be free from injury by end of the shift  Outcome: Progressing  Goal: Verbalize understanding of personal risk factors for fall in the hospital  Outcome: Progressing  Goal: Verbalize understanding of risk factor reduction measures to prevent injury from fall in the home  Outcome: Progressing  Goal: Use assistive devices by end of the shift  Outcome: Progressing  Goal: Pace activities to prevent fatigue by end of the shift  Outcome: Progressing     Problem: Skin  Goal: Participates in plan/prevention/treatment measures  Outcome: Progressing  Goal: Prevent/manage excess moisture  Outcome: Progressing  Goal: Prevent/minimize sheer/friction injuries  Outcome: Progressing  Goal: Promote/optimize nutrition  Outcome: Progressing  Goal: Promote skin healing  Outcome: Progressing     Problem: Urinary Incontinence  Goal: I will maintain optimum level of urinary continence  Outcome: Progressing

## 2024-05-04 ENCOUNTER — APPOINTMENT (OUTPATIENT)
Dept: CARDIOLOGY | Facility: HOSPITAL | Age: 79
DRG: 689 | End: 2024-05-04
Payer: MEDICARE

## 2024-05-04 LAB
ANION GAP SERPL CALC-SCNC: 9 MMOL/L (ref 10–20)
ATRIAL RATE: 80 BPM
BUN SERPL-MCNC: 19 MG/DL (ref 6–23)
CALCIUM SERPL-MCNC: 9.1 MG/DL (ref 8.6–10.3)
CHLORIDE SERPL-SCNC: 109 MMOL/L (ref 98–107)
CO2 SERPL-SCNC: 26 MMOL/L (ref 21–32)
CREAT SERPL-MCNC: 0.63 MG/DL (ref 0.5–1.05)
EGFRCR SERPLBLD CKD-EPI 2021: >90 ML/MIN/1.73M*2
ERYTHROCYTE [DISTWIDTH] IN BLOOD BY AUTOMATED COUNT: 12.9 % (ref 11.5–14.5)
GLUCOSE SERPL-MCNC: 93 MG/DL (ref 74–99)
HCT VFR BLD AUTO: 32.8 % (ref 36–46)
HGB BLD-MCNC: 10.3 G/DL (ref 12–16)
MAGNESIUM SERPL-MCNC: 1.59 MG/DL (ref 1.6–2.4)
MCH RBC QN AUTO: 32.1 PG (ref 26–34)
MCHC RBC AUTO-ENTMCNC: 31.4 G/DL (ref 32–36)
MCV RBC AUTO: 102 FL (ref 80–100)
NRBC BLD-RTO: 0 /100 WBCS (ref 0–0)
PHOSPHATE SERPL-MCNC: 4.4 MG/DL (ref 2.5–4.9)
PLATELET # BLD AUTO: 184 X10*3/UL (ref 150–450)
POTASSIUM SERPL-SCNC: 3.4 MMOL/L (ref 3.5–5.3)
Q ONSET: 218 MS
QRS COUNT: 25 BEATS
QRS DURATION: 78 MS
QT INTERVAL: 318 MS
QTC CALCULATION(BAZETT): 509 MS
QTC FREDERICIA: 435 MS
R AXIS: -36 DEGREES
RBC # BLD AUTO: 3.21 X10*6/UL (ref 4–5.2)
SODIUM SERPL-SCNC: 141 MMOL/L (ref 136–145)
T AXIS: 72 DEGREES
T OFFSET: 377 MS
VENTRICULAR RATE: 154 BPM
WBC # BLD AUTO: 4.2 X10*3/UL (ref 4.4–11.3)

## 2024-05-04 PROCEDURE — 80048 BASIC METABOLIC PNL TOTAL CA: CPT | Performed by: STUDENT IN AN ORGANIZED HEALTH CARE EDUCATION/TRAINING PROGRAM

## 2024-05-04 PROCEDURE — 93970 EXTREMITY STUDY: CPT

## 2024-05-04 PROCEDURE — 83735 ASSAY OF MAGNESIUM: CPT | Performed by: STUDENT IN AN ORGANIZED HEALTH CARE EDUCATION/TRAINING PROGRAM

## 2024-05-04 PROCEDURE — 2500000001 HC RX 250 WO HCPCS SELF ADMINISTERED DRUGS (ALT 637 FOR MEDICARE OP): Performed by: INTERNAL MEDICINE

## 2024-05-04 PROCEDURE — 36415 COLL VENOUS BLD VENIPUNCTURE: CPT | Performed by: STUDENT IN AN ORGANIZED HEALTH CARE EDUCATION/TRAINING PROGRAM

## 2024-05-04 PROCEDURE — 93005 ELECTROCARDIOGRAM TRACING: CPT

## 2024-05-04 PROCEDURE — 93970 EXTREMITY STUDY: CPT | Performed by: SURGERY

## 2024-05-04 PROCEDURE — 2500000006 HC RX 250 W HCPCS SELF ADMINISTERED DRUGS (ALT 637 FOR ALL PAYERS): Mod: MUE | Performed by: STUDENT IN AN ORGANIZED HEALTH CARE EDUCATION/TRAINING PROGRAM

## 2024-05-04 PROCEDURE — 99222 1ST HOSP IP/OBS MODERATE 55: CPT | Performed by: INTERNAL MEDICINE

## 2024-05-04 PROCEDURE — 99232 SBSQ HOSP IP/OBS MODERATE 35: CPT | Performed by: STUDENT IN AN ORGANIZED HEALTH CARE EDUCATION/TRAINING PROGRAM

## 2024-05-04 PROCEDURE — 2500000001 HC RX 250 WO HCPCS SELF ADMINISTERED DRUGS (ALT 637 FOR MEDICARE OP): Performed by: PSYCHIATRY & NEUROLOGY

## 2024-05-04 PROCEDURE — 2500000001 HC RX 250 WO HCPCS SELF ADMINISTERED DRUGS (ALT 637 FOR MEDICARE OP): Performed by: STUDENT IN AN ORGANIZED HEALTH CARE EDUCATION/TRAINING PROGRAM

## 2024-05-04 PROCEDURE — 84100 ASSAY OF PHOSPHORUS: CPT | Performed by: STUDENT IN AN ORGANIZED HEALTH CARE EDUCATION/TRAINING PROGRAM

## 2024-05-04 PROCEDURE — 2500000004 HC RX 250 GENERAL PHARMACY W/ HCPCS (ALT 636 FOR OP/ED): Performed by: STUDENT IN AN ORGANIZED HEALTH CARE EDUCATION/TRAINING PROGRAM

## 2024-05-04 PROCEDURE — 85027 COMPLETE CBC AUTOMATED: CPT | Performed by: STUDENT IN AN ORGANIZED HEALTH CARE EDUCATION/TRAINING PROGRAM

## 2024-05-04 PROCEDURE — 2500000001 HC RX 250 WO HCPCS SELF ADMINISTERED DRUGS (ALT 637 FOR MEDICARE OP): Performed by: REGISTERED NURSE

## 2024-05-04 PROCEDURE — G0378 HOSPITAL OBSERVATION PER HR: HCPCS

## 2024-05-04 PROCEDURE — 2500000005 HC RX 250 GENERAL PHARMACY W/O HCPCS: Performed by: STUDENT IN AN ORGANIZED HEALTH CARE EDUCATION/TRAINING PROGRAM

## 2024-05-04 RX ORDER — METOPROLOL TARTRATE 1 MG/ML
5 INJECTION, SOLUTION INTRAVENOUS EVERY 6 HOURS PRN
Status: DISCONTINUED | OUTPATIENT
Start: 2024-05-04 | End: 2024-05-08 | Stop reason: HOSPADM

## 2024-05-04 RX ORDER — FLECAINIDE ACETATE 50 MG/1
50 TABLET ORAL EVERY 12 HOURS SCHEDULED
Status: DISCONTINUED | OUTPATIENT
Start: 2024-05-04 | End: 2024-05-08 | Stop reason: HOSPADM

## 2024-05-04 RX ORDER — TRAMADOL HYDROCHLORIDE 50 MG/1
50 TABLET ORAL EVERY 12 HOURS PRN
Status: DISCONTINUED | OUTPATIENT
Start: 2024-05-04 | End: 2024-05-05

## 2024-05-04 RX ORDER — POTASSIUM CHLORIDE 20 MEQ/1
40 TABLET, EXTENDED RELEASE ORAL ONCE
Status: COMPLETED | OUTPATIENT
Start: 2024-05-04 | End: 2024-05-04

## 2024-05-04 RX ORDER — TALC
6 POWDER (GRAM) TOPICAL NIGHTLY PRN
Status: DISCONTINUED | OUTPATIENT
Start: 2024-05-04 | End: 2024-05-08 | Stop reason: HOSPADM

## 2024-05-04 RX ORDER — MAGNESIUM SULFATE HEPTAHYDRATE 40 MG/ML
2 INJECTION, SOLUTION INTRAVENOUS ONCE
Status: COMPLETED | OUTPATIENT
Start: 2024-05-04 | End: 2024-05-04

## 2024-05-04 RX ORDER — METOPROLOL TARTRATE 25 MG/1
25 TABLET, FILM COATED ORAL 2 TIMES DAILY
Status: DISCONTINUED | OUTPATIENT
Start: 2024-05-04 | End: 2024-05-08 | Stop reason: HOSPADM

## 2024-05-04 RX ORDER — ACETAMINOPHEN 650 MG/1
650 SUPPOSITORY RECTAL EVERY 4 HOURS PRN
Status: DISCONTINUED | OUTPATIENT
Start: 2024-05-04 | End: 2024-05-04

## 2024-05-04 RX ORDER — ACETAMINOPHEN 325 MG/1
650 TABLET ORAL EVERY 4 HOURS PRN
Status: DISCONTINUED | OUTPATIENT
Start: 2024-05-04 | End: 2024-05-08 | Stop reason: HOSPADM

## 2024-05-04 RX ORDER — ACETAMINOPHEN 160 MG/5ML
650 SOLUTION ORAL EVERY 4 HOURS PRN
Status: DISCONTINUED | OUTPATIENT
Start: 2024-05-04 | End: 2024-05-04

## 2024-05-04 RX ORDER — LIDOCAINE 560 MG/1
1 PATCH PERCUTANEOUS; TOPICAL; TRANSDERMAL DAILY
Status: DISCONTINUED | OUTPATIENT
Start: 2024-05-04 | End: 2024-05-08 | Stop reason: HOSPADM

## 2024-05-04 RX ADMIN — Medication 6 MG: at 20:12

## 2024-05-04 RX ADMIN — MAGNESIUM SULFATE HEPTAHYDRATE 2 G: 40 INJECTION, SOLUTION INTRAVENOUS at 10:19

## 2024-05-04 RX ADMIN — MIRTAZAPINE 7.5 MG: 15 TABLET, FILM COATED ORAL at 20:12

## 2024-05-04 RX ADMIN — ROSUVASTATIN CALCIUM 20 MG: 20 TABLET, FILM COATED ORAL at 20:12

## 2024-05-04 RX ADMIN — ACETAMINOPHEN 650 MG: 325 TABLET ORAL at 01:14

## 2024-05-04 RX ADMIN — VALSARTAN 80 MG: 80 TABLET, FILM COATED ORAL at 09:34

## 2024-05-04 RX ADMIN — METOPROLOL TARTRATE 25 MG: 25 TABLET, FILM COATED ORAL at 22:06

## 2024-05-04 RX ADMIN — FLECAINIDE ACETATE 50 MG: 50 TABLET ORAL at 22:07

## 2024-05-04 RX ADMIN — LIDOCAINE 4% 1 PATCH: 40 PATCH TOPICAL at 15:01

## 2024-05-04 RX ADMIN — PANTOPRAZOLE SODIUM 40 MG: 40 TABLET, DELAYED RELEASE ORAL at 06:11

## 2024-05-04 RX ADMIN — CEFTRIAXONE SODIUM 1 G: 1 INJECTION, SOLUTION INTRAVENOUS at 11:43

## 2024-05-04 RX ADMIN — ACETAMINOPHEN 650 MG: 325 TABLET ORAL at 01:12

## 2024-05-04 RX ADMIN — POTASSIUM CHLORIDE 40 MEQ: 1500 TABLET, EXTENDED RELEASE ORAL at 11:43

## 2024-05-04 RX ADMIN — ACETAMINOPHEN 650 MG: 325 TABLET ORAL at 09:40

## 2024-05-04 RX ADMIN — ASPIRIN 81 MG: 81 TABLET, COATED ORAL at 09:34

## 2024-05-04 ASSESSMENT — PAIN SCALES - GENERAL
PAINLEVEL_OUTOF10: 8
PAINLEVEL_OUTOF10: 0 - NO PAIN
PAINLEVEL_OUTOF10: 3
PAINLEVEL_OUTOF10: 4
PAINLEVEL_OUTOF10: 8
PAINLEVEL_OUTOF10: 3
PAINLEVEL_OUTOF10: 8

## 2024-05-04 ASSESSMENT — COGNITIVE AND FUNCTIONAL STATUS - GENERAL
HELP NEEDED FOR BATHING: TOTAL
DRESSING REGULAR UPPER BODY CLOTHING: A LOT
MOVING FROM LYING ON BACK TO SITTING ON SIDE OF FLAT BED WITH BEDRAILS: A LOT
MOVING TO AND FROM BED TO CHAIR: A LOT
PERSONAL GROOMING: A LOT
WALKING IN HOSPITAL ROOM: A LOT
DRESSING REGULAR LOWER BODY CLOTHING: A LOT
STANDING UP FROM CHAIR USING ARMS: A LOT
TOILETING: A LOT
DAILY ACTIVITIY SCORE: 12
TURNING FROM BACK TO SIDE WHILE IN FLAT BAD: A LOT
CLIMB 3 TO 5 STEPS WITH RAILING: A LOT
MOBILITY SCORE: 12
EATING MEALS: A LITTLE

## 2024-05-04 ASSESSMENT — PAIN DESCRIPTION - LOCATION
LOCATION: KNEE
LOCATION: HEAD
LOCATION: KNEE
LOCATION: HEAD

## 2024-05-04 ASSESSMENT — PAIN DESCRIPTION - ORIENTATION
ORIENTATION: LEFT
ORIENTATION: LEFT

## 2024-05-04 ASSESSMENT — PAIN - FUNCTIONAL ASSESSMENT: PAIN_FUNCTIONAL_ASSESSMENT: 0-10

## 2024-05-04 NOTE — CARE PLAN
The patient's goals for the shift include Remain hemodynamically stable    The clinical goals for the shift include PT WILL USE THE CALL LIGHT WHEN ASSSITANCE IS NEEDED TO PREVENT FALLS

## 2024-05-04 NOTE — CARE PLAN
The patient's goals for the shift include Remain hemodynamically stable    The clinical goals for the shift include safety maintained    Safety maintained. No falls this shift.  Problem: Pain  Goal: My pain/discomfort is manageable  Outcome: Progressing     Problem: Safety  Goal: Patient will be injury free during hospitalization  Outcome: Progressing  Goal: I will remain free of falls  Outcome: Progressing     Problem: Daily Care  Goal: Daily care needs are met  Outcome: Progressing     Problem: Psychosocial Needs  Goal: Demonstrates ability to cope with hospitalization/illness  Outcome: Progressing  Goal: Collaborate with me, my family, and caregiver to identify my specific goals  Outcome: Progressing

## 2024-05-04 NOTE — PROGRESS NOTES
"Theodora Goel is a 78 y.o. female on day 0 of admission presenting with Weakness.    Subjective   Patient seen and examined at bedside.  Pt reports she is doing ok today, she is having some L knee pain today that she reports is acute on chronic. She also reports she did not sleep well last night and would like to something to help her sleep.     Yesterday afternoon patient was tachycardic 150s, EKG showed SVT and it resolved with 5mg IV metoprolol. She was asymptomatic at the time. Pt has hx of SVT and loop recorder. It is reported she has been off atenolol since prior hospitalization. Will consult EP.        Objective     Physical Exam    Constitutional: Well developed, no distress, alert and cooperative  Skin: Warm and dry, scattered RLE bruising   Eyes: EOMI, clear sclera  ENMT: mucous membranes moist  Respiratory: Patent airways, CTAB  Cardiovascular: Regular, rate and rhythm  Abdominal: Nondistended, soft, non-tender, +BS  MSK: ROM intact, mild LLE swelling no tenderness to palpation/squeeze  Neuro: alert and oriented x3    Last Recorded Vitals  Blood pressure 160/72, pulse 75, temperature 36.3 °C (97.3 °F), temperature source Temporal, resp. rate 15, height 1.676 m (5' 5.98\"), weight 72.8 kg (160 lb 7.9 oz), SpO2 95%.  Intake/Output last 3 Shifts:  I/O last 3 completed shifts:  In: 755 (10.4 mL/kg) [P.O.:120; I.V.:585 (8 mL/kg); IV Piggyback:50]  Out: 1300 (17.9 mL/kg) [Urine:1300 (0.5 mL/kg/hr)]  Weight: 72.8 kg     Relevant Results  Scheduled medications  aspirin, 81 mg, oral, Daily  cefTRIAXone, 1 g, intravenous, q24h  mirtazapine, 7.5 mg, oral, Nightly  pantoprazole, 40 mg, oral, Daily before breakfast   Or  pantoprazole, 40 mg, intravenous, Daily before breakfast  rosuvastatin, 20 mg, oral, Nightly  valsartan, 80 mg, oral, Daily      Continuous medications     PRN medications  PRN medications: acetaminophen **OR** acetaminophen **OR** acetaminophen, acetaminophen **OR** acetaminophen **OR** " acetaminophen, ALPRAZolam, melatonin, ondansetron ODT **OR** ondansetron, polyethylene glycol  Results from last 7 days   Lab Units 05/04/24 0518 05/03/24  0624 05/02/24  1825   WBC AUTO x10*3/uL 4.2* 3.5* 3.9*   RBC AUTO x10*6/uL 3.21* 3.11* 3.65*   HEMOGLOBIN g/dL 10.3* 10.0* 11.8*     Results from last 7 days   Lab Units 05/04/24  0518 05/03/24  0624 05/02/24  1825   SODIUM mmol/L 141 143 142   POTASSIUM mmol/L 3.4* 3.2* 3.6   CHLORIDE mmol/L 109* 109* 108*   CO2 mmol/L 26 26 27   BUN mg/dL 19 19 26*   CREATININE mg/dL 0.63 0.50 0.62   CALCIUM mg/dL 9.1 8.9 9.3   PHOSPHORUS mg/dL 4.4  --   --    MAGNESIUM mg/dL 1.59*  --   --    BILIRUBIN TOTAL mg/dL  --  0.4 0.3   ALT U/L  --  17 21   AST U/L  --  21 27       Electrocardiogram, 12-lead PRN ACS symptoms    Result Date: 5/3/2024  Supraventricular tachycardia Left axis deviation Septal infarct , age undetermined Abnormal ECG When compared with ECG of 02-MAY-2024 19:58, (unconfirmed) MD interval has decreased Vent. rate has increased BY  91 BPM    ECG 12 Lead    Result Date: 5/3/2024  Sinus rhythm with 1st degree AV block Left axis deviation Abnormal ECG When compared with ECG of 26-JUL-2013 10:06, MD interval has increased    XR chest 1 view    Result Date: 5/2/2024  STUDY: Chest Radiograph;  05/02/2024, 6:55 PM. INDICATION: Altered mental status. COMPARISON: CXR: 01/08/24, 06/24/22, 01/28/22. ACCESSION NUMBER(S): [id number] ORDERING CLINICIAN: WILLIAN MEAD TECHNIQUE:  Frontal chest was obtained at 18:55 hours. FINDINGS: CARDIOMEDIASTINAL SILHOUETTE: Cardiomediastinal silhouette is mildly enlarged but without significant change considering differences in technique and depth of inspiration lower on the current study.  Heart size likely upper limits of recorder device projecting over the left side of the heart.  LUNGS: Relatively low depth of inspiration without focal consolidation or pulmonary edema.  No pleural effusions or pneumothorax.  ABDOMEN: No remarkable  upper abdominal findings.  BONES: No acute osseous changes.  Degenerative changes of the glenohumeral joints, and associated ossified loose bodies.  Mild thoracic scoliosis.    No acute pulmonary process. Signed by Shyam Cruz DO    CT head wo IV contrast    Result Date: 5/2/2024  Interpreted By:  Edward Subramanian, STUDY: CT HEAD WO IV CONTRAST;  5/2/2024 7:02 pm   INDICATION: Signs/Symptoms:frequent falls generalized weakness.   COMPARISON: None.   ACCESSION NUMBER(S): [id number]   ORDERING CLINICIAN: ALEJANDRA CROSS   TECHNIQUE: Noncontrast axial CT images of head were obtained with coronal and sagittal reconstructed images.   FINDINGS: BRAIN PARENCHYMA:  No acute intraparenchymal hemorrhage or parenchymal evidence of acute large territory ischemic infarct. No mass-effect. Gray-white matter distinction is preserved.   VENTRICLES and EXTRA-AXIAL SPACES:  No acute extra-axial or intraventricular hemorrhage. No effacement of cerebral sulci. Ventricles and sulci are age-concordant.   PARANASAL SINUSES/MASTOIDS:  No hemorrhage or air-fluid levels within the visualized paranasal sinuses. The mastoids are well aerated.   CALVARIUM/ORBITS:  No skull fracture.  The orbits and globes are intact to the extent visualized.   EXTRACRANIAL SOFT TISSUES: No discernible abnormality.       1. No acute intracranial abnormality.         MACRO: None.   Signed by: Edward Subramanian 5/2/2024 7:25 PM Dictation workstation:   [id number]      Assessment/Plan   Principal Problem:    Weakness  Active Problems:    Anxiety    Recurrent major depressive disorder (CMS-[facility])    Urinary incontinence    -wbc 4.2  -UA +   -f/u Ucx - enteric bacilli    -continue rocephin  -oriented x3, family concerned for worsening depression, ?underlying dementia   -psych consulted   -palliative consulted   -holding home tramadol   -d/c xanax/ambien   -started on remeron and melatonin   -long run SVT yesterday, resolved with 5mg IV metop; does have Hx of  Afib/SVT with loop recorder  -continue PRN IV metop  -recently had atenolol d/c'd   -EP consulted   -echo from Jan 2024 reviewed   -PT/OT, plan for SNF   -f/u LE dopplers   -lidocaine patch for L knee   -PRN tylenol and home ultram   -continue home meds     FENGI   -encourage oral hydration   -replete electrolytes PRN  -cardiac diet   -GI ppx: ppi  -DVT ppx: SCDs    Dispo: This is a 78-year-old female who presented with reported confusion and concern for urinary tract infection.  UA was mildly positive and patient was started on antibiotics.  Pt's mentation is currently A&Ox3. Family also reported concern for depression.  Psychiatry and palliative care consulted.  PT OT. Plan for SNF on d/c, precert pending. EP consulted for SVT.  Will likely stay greater than 2 midnights.    This note is created using voice recognition software. All efforts are made to minimize errors, if there are errors there due to transcription.    Shraddha Gupta, DO  Hospitalist

## 2024-05-04 NOTE — CARE PLAN
The patient's goals for the shift include Remain hemodynamically stable    The clinical goals for the shift include safety maintained

## 2024-05-04 NOTE — PROGRESS NOTES
"Theodora Goel is a 78 y.o. female on day 0 of admission presenting with Weakness.    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  Is aware of mental health but remains sad regarding the upcoming loss of her friend due to cancer  Pt states though she is grieving she has significant support at home, close relationship with her children  Poor sleep last night, states did not fall asleep until 5am slept until appx 9am  Denies side effects from Remeron  Denies Anxiety SI HI AVH; devoid of any delusions   Endorses goals in future, love for family and desire to get well    Exam:  Vital Signs: /72 (BP Location: Right arm, Patient Position: Lying)   Pulse 75   Temp 36.3 °C (97.3 °F) (Temporal)   Resp 15   Ht 1.676 m (5' 5.98\")   Wt 72.8 kg (160 lb 7.9 oz)   SpO2 95%   BMI 25.92 kg/m²   Musculoskeletal:  normal  Gait/Station:  in bed      Mental Status Exam:  General Appearance: {BH Psych Appearance:[id number]  Speech: normal pitch and normal volume  Mood: Sad  Affect: tearful  Thought Process: Linear, goal directed  Thought Associations: No loosening of associations  Thought Content: normal  Perception: No perceptual abnormalities noted  Level of Consciousness: Alert  Orientation: Alert and oriented to person, place, time and situation  Attention and Concentration: Intact  Cognition:intact  Insight: good  Judgment: good     Results for orders placed or performed during the hospital encounter of 05/02/24 (from the past 96 hour(s))   Comprehensive metabolic panel   Result Value Ref Range    Glucose 89 74 - 99 mg/dL    Sodium 142 136 - 145 mmol/L    Potassium 3.6 3.5 - 5.3 mmol/L    Chloride 108 (H) 98 - 107 mmol/L    Bicarbonate 27 21 - 32 mmol/L    Anion Gap 11 10 - 20 mmol/L    Urea Nitrogen 26 (H) 6 - 23 mg/dL    Creatinine 0.62 0.50 - 1.05 mg/dL    eGFR >90 >60 mL/min/1.73m*2    Calcium 9.3 8.6 - 10.3 mg/dL    Albumin 3.9 3.4 - 5.0 g/dL    Alkaline " Phosphatase 67 33 - 136 U/L    Total Protein 6.6 6.4 - 8.2 g/dL    AST 27 9 - 39 U/L    Bilirubin, Total 0.3 0.0 - 1.2 mg/dL    ALT 21 7 - 45 U/L   TSH with reflex to Free T4 if abnormal   Result Value Ref Range    Thyroid Stimulating Hormone 5.42 (H) 0.44 - 3.98 mIU/L   Folate   Result Value Ref Range    Folate, Serum 13.8 >5.0 ng/mL   Ammonia   Result Value Ref Range    Ammonia 11 (L) 16 - 53 umol/L   Troponin I, High Sensitivity   Result Value Ref Range    Troponin I, High Sensitivity 5 0 - 13 ng/L   CBC and Auto Differential   Result Value Ref Range    WBC 3.9 (L) 4.4 - 11.3 x10*3/uL    nRBC 0.0 0.0 - 0.0 /100 WBCs    RBC 3.65 (L) 4.00 - 5.20 x10*6/uL    Hemoglobin 11.8 (L) 12.0 - 16.0 g/dL    Hematocrit 37.0 36.0 - 46.0 %     (H) 80 - 100 fL    MCH 32.3 26.0 - 34.0 pg    MCHC 31.9 (L) 32.0 - 36.0 g/dL    RDW 13.0 11.5 - 14.5 %    Platelets 201 150 - 450 x10*3/uL    Neutrophils % 50.1 40.0 - 80.0 %    Immature Granulocytes %, Automated 0.3 0.0 - 0.9 %    Lymphocytes % 36.7 13.0 - 44.0 %    Monocytes % 10.0 2.0 - 10.0 %    Eosinophils % 2.6 0.0 - 6.0 %    Basophils % 0.3 0.0 - 2.0 %    Neutrophils Absolute 1.96 1.60 - 5.50 x10*3/uL    Immature Granulocytes Absolute, Automated 0.01 0.00 - 0.50 x10*3/uL    Lymphocytes Absolute 1.43 0.80 - 3.00 x10*3/uL    Monocytes Absolute 0.39 0.05 - 0.80 x10*3/uL    Eosinophils Absolute 0.10 0.00 - 0.40 x10*3/uL    Basophils Absolute 0.01 0.00 - 0.10 x10*3/uL   Creatine Kinase   Result Value Ref Range    Creatine Kinase 25 0 - 215 U/L   Protime-INR   Result Value Ref Range    Protime 10.8 9.8 - 12.8 seconds    INR 1.0 0.9 - 1.1   Thyroxine, Free   Result Value Ref Range    Thyroxine, Free 0.76 0.61 - 1.12 ng/dL   ECG 12 Lead   Result Value Ref Range    Ventricular Rate 63 BPM    Atrial Rate 63 BPM    OR Interval 216 ms    QRS Duration 94 ms    QT Interval 432 ms    QTC Calculation(Bazett) 442 ms    P Axis 51 degrees    R Axis -32 degrees    T Axis 22 degrees    QRS Count  10 beats    Q Onset 214 ms    P Onset 106 ms    P Offset 173 ms    T Offset 430 ms    QTC Fredericia 439 ms   Sars-CoV-2 PCR   Result Value Ref Range    Coronavirus 2019, PCR Not Detected Not Detected   Influenza A, and B PCR   Result Value Ref Range    Flu A Result Not Detected Not Detected    Flu B Result Not Detected Not Detected   CBC   Result Value Ref Range    WBC 3.5 (L) 4.4 - 11.3 x10*3/uL    nRBC 0.0 0.0 - 0.0 /100 WBCs    RBC 3.11 (L) 4.00 - 5.20 x10*6/uL    Hemoglobin 10.0 (L) 12.0 - 16.0 g/dL    Hematocrit 32.0 (L) 36.0 - 46.0 %     (H) 80 - 100 fL    MCH 32.2 26.0 - 34.0 pg    MCHC 31.3 (L) 32.0 - 36.0 g/dL    RDW 12.9 11.5 - 14.5 %    Platelets 175 150 - 450 x10*3/uL   Comprehensive metabolic panel   Result Value Ref Range    Glucose 104 (H) 74 - 99 mg/dL    Sodium 143 136 - 145 mmol/L    Potassium 3.2 (L) 3.5 - 5.3 mmol/L    Chloride 109 (H) 98 - 107 mmol/L    Bicarbonate 26 21 - 32 mmol/L    Anion Gap 11 10 - 20 mmol/L    Urea Nitrogen 19 6 - 23 mg/dL    Creatinine 0.50 0.50 - 1.05 mg/dL    eGFR >90 >60 mL/min/1.73m*2    Calcium 8.9 8.6 - 10.3 mg/dL    Albumin 3.3 (L) 3.4 - 5.0 g/dL    Alkaline Phosphatase 54 33 - 136 U/L    Total Protein 5.6 (L) 6.4 - 8.2 g/dL    AST 21 9 - 39 U/L    Bilirubin, Total 0.4 0.0 - 1.2 mg/dL    ALT 17 7 - 45 U/L   Urinalysis with Reflex Culture and Microscopic   Result Value Ref Range    Color, Urine Light-Yellow Light-Yellow, Yellow, Dark-Yellow    Appearance, Urine Turbid (N) Clear    Specific Gravity, Urine 1.018 1.005 - 1.035    pH, Urine 5.5 5.0, 5.5, 6.0, 6.5, 7.0, 7.5, 8.0    Protein, Urine NEGATIVE NEGATIVE, 10 (TRACE), 20 (TRACE) mg/dL    Glucose, Urine Normal Normal mg/dL    Blood, Urine NEGATIVE NEGATIVE    Ketones, Urine NEGATIVE NEGATIVE mg/dL    Bilirubin, Urine NEGATIVE NEGATIVE    Urobilinogen, Urine Normal Normal mg/dL    Nitrite, Urine NEGATIVE NEGATIVE    Leukocyte Esterase, Urine 250 Irena/µL (A) NEGATIVE   Extra Urine Gray Tube   Result Value  Ref Range    Extra Tube Hold for add-ons.    Microscopic Only, Urine   Result Value Ref Range    WBC, Urine 11-20 (A) 1-5, NONE /HPF    RBC, Urine 1-2 NONE, 1-2, 3-5 /HPF   Urine Culture    Specimen: Straight Catheter; Urine   Result Value Ref Range    Urine Culture >100,000 Enteric bacilli (A)    Electrocardiogram, 12-lead PRN ACS symptoms   Result Value Ref Range    Ventricular Rate 154 BPM    Atrial Rate 80 BPM    QRS Duration 78 ms    QT Interval 318 ms    QTC Calculation(Bazett) 509 ms    R Axis -36 degrees    T Axis 72 degrees    QRS Count 25 beats    Q Onset 218 ms    T Offset 377 ms    QTC Fredericia 435 ms   CBC   Result Value Ref Range    WBC 4.2 (L) 4.4 - 11.3 x10*3/uL    nRBC 0.0 0.0 - 0.0 /100 WBCs    RBC 3.21 (L) 4.00 - 5.20 x10*6/uL    Hemoglobin 10.3 (L) 12.0 - 16.0 g/dL    Hematocrit 32.8 (L) 36.0 - 46.0 %     (H) 80 - 100 fL    MCH 32.1 26.0 - 34.0 pg    MCHC 31.4 (L) 32.0 - 36.0 g/dL    RDW 12.9 11.5 - 14.5 %    Platelets 184 150 - 450 x10*3/uL   Basic Metabolic Panel   Result Value Ref Range    Glucose 93 74 - 99 mg/dL    Sodium 141 136 - 145 mmol/L    Potassium 3.4 (L) 3.5 - 5.3 mmol/L    Chloride 109 (H) 98 - 107 mmol/L    Bicarbonate 26 21 - 32 mmol/L    Anion Gap 9 (L) 10 - 20 mmol/L    Urea Nitrogen 19 6 - 23 mg/dL    Creatinine 0.63 0.50 - 1.05 mg/dL    eGFR >90 >60 mL/min/1.73m*2    Calcium 9.1 8.6 - 10.3 mg/dL   Magnesium   Result Value Ref Range    Magnesium 1.59 (L) 1.60 - 2.40 mg/dL   Phosphorus   Result Value Ref Range    Phosphorus 4.4 2.5 - 4.9 mg/dL         Risk Assessment:  Suicide low    Impression:    MDD, recurrent moderate    Recommendations:      Monitor for any benzo withdrawal  Increase Melatonin 6mg QHS  Discussed with patient and the family about starting her on Remeron 7.5 mg for 1 week increase to 15 mg from second week if tolerating well.  They are agreeable to the treatment plan  Patient is not at imminent risk of suicide.  She would benefit from counseling and  follow-up care  Will continue to follow during her stay in the hospital      Thank you for allowing us to participate in the care of this patient.       ILDA Castorena-CNP  5/4/2024  2:08 PM

## 2024-05-05 ENCOUNTER — APPOINTMENT (OUTPATIENT)
Dept: CARDIOLOGY | Facility: HOSPITAL | Age: 79
DRG: 689 | End: 2024-05-05
Payer: MEDICARE

## 2024-05-05 PROBLEM — R53.1 GENERALIZED WEAKNESS: Status: ACTIVE | Noted: 2024-05-05

## 2024-05-05 LAB
25(OH)D3 SERPL-MCNC: 26 NG/ML (ref 30–100)
ANION GAP SERPL CALC-SCNC: 11 MMOL/L (ref 10–20)
BACTERIA UR CULT: ABNORMAL
BUN SERPL-MCNC: 26 MG/DL (ref 6–23)
CALCIUM SERPL-MCNC: 9.2 MG/DL (ref 8.6–10.3)
CHLORIDE SERPL-SCNC: 107 MMOL/L (ref 98–107)
CO2 SERPL-SCNC: 27 MMOL/L (ref 21–32)
CREAT SERPL-MCNC: 0.64 MG/DL (ref 0.5–1.05)
EGFRCR SERPLBLD CKD-EPI 2021: >90 ML/MIN/1.73M*2
ERYTHROCYTE [DISTWIDTH] IN BLOOD BY AUTOMATED COUNT: 13 % (ref 11.5–14.5)
FOLATE SERPL-MCNC: 6 NG/ML
GLUCOSE SERPL-MCNC: 96 MG/DL (ref 74–99)
HCT VFR BLD AUTO: 35 % (ref 36–46)
HGB BLD-MCNC: 10.9 G/DL (ref 12–16)
MAGNESIUM SERPL-MCNC: 1.76 MG/DL (ref 1.6–2.4)
MCH RBC QN AUTO: 31.7 PG (ref 26–34)
MCHC RBC AUTO-ENTMCNC: 31.1 G/DL (ref 32–36)
MCV RBC AUTO: 102 FL (ref 80–100)
NRBC BLD-RTO: 0 /100 WBCS (ref 0–0)
PHOSPHATE SERPL-MCNC: 4.4 MG/DL (ref 2.5–4.9)
PLATELET # BLD AUTO: 197 X10*3/UL (ref 150–450)
POTASSIUM SERPL-SCNC: 3.8 MMOL/L (ref 3.5–5.3)
RBC # BLD AUTO: 3.44 X10*6/UL (ref 4–5.2)
SODIUM SERPL-SCNC: 141 MMOL/L (ref 136–145)
VIT B12 SERPL-MCNC: 194 PG/ML (ref 211–911)
WBC # BLD AUTO: 5.4 X10*3/UL (ref 4.4–11.3)

## 2024-05-05 PROCEDURE — 82306 VITAMIN D 25 HYDROXY: CPT | Performed by: STUDENT IN AN ORGANIZED HEALTH CARE EDUCATION/TRAINING PROGRAM

## 2024-05-05 PROCEDURE — 2500000001 HC RX 250 WO HCPCS SELF ADMINISTERED DRUGS (ALT 637 FOR MEDICARE OP): Performed by: INTERNAL MEDICINE

## 2024-05-05 PROCEDURE — 1200000002 HC GENERAL ROOM WITH TELEMETRY DAILY

## 2024-05-05 PROCEDURE — 2500000001 HC RX 250 WO HCPCS SELF ADMINISTERED DRUGS (ALT 637 FOR MEDICARE OP): Performed by: STUDENT IN AN ORGANIZED HEALTH CARE EDUCATION/TRAINING PROGRAM

## 2024-05-05 PROCEDURE — 2500000001 HC RX 250 WO HCPCS SELF ADMINISTERED DRUGS (ALT 637 FOR MEDICARE OP): Performed by: REGISTERED NURSE

## 2024-05-05 PROCEDURE — 80048 BASIC METABOLIC PNL TOTAL CA: CPT | Performed by: STUDENT IN AN ORGANIZED HEALTH CARE EDUCATION/TRAINING PROGRAM

## 2024-05-05 PROCEDURE — 99232 SBSQ HOSP IP/OBS MODERATE 35: CPT | Performed by: INTERNAL MEDICINE

## 2024-05-05 PROCEDURE — 82746 ASSAY OF FOLIC ACID SERUM: CPT | Performed by: STUDENT IN AN ORGANIZED HEALTH CARE EDUCATION/TRAINING PROGRAM

## 2024-05-05 PROCEDURE — 84100 ASSAY OF PHOSPHORUS: CPT | Performed by: STUDENT IN AN ORGANIZED HEALTH CARE EDUCATION/TRAINING PROGRAM

## 2024-05-05 PROCEDURE — 93005 ELECTROCARDIOGRAM TRACING: CPT

## 2024-05-05 PROCEDURE — 83735 ASSAY OF MAGNESIUM: CPT | Performed by: STUDENT IN AN ORGANIZED HEALTH CARE EDUCATION/TRAINING PROGRAM

## 2024-05-05 PROCEDURE — 85027 COMPLETE CBC AUTOMATED: CPT | Performed by: STUDENT IN AN ORGANIZED HEALTH CARE EDUCATION/TRAINING PROGRAM

## 2024-05-05 PROCEDURE — 2500000004 HC RX 250 GENERAL PHARMACY W/ HCPCS (ALT 636 FOR OP/ED): Performed by: STUDENT IN AN ORGANIZED HEALTH CARE EDUCATION/TRAINING PROGRAM

## 2024-05-05 PROCEDURE — 2500000006 HC RX 250 W HCPCS SELF ADMINISTERED DRUGS (ALT 637 FOR ALL PAYERS): Performed by: STUDENT IN AN ORGANIZED HEALTH CARE EDUCATION/TRAINING PROGRAM

## 2024-05-05 PROCEDURE — 82607 VITAMIN B-12: CPT | Performed by: STUDENT IN AN ORGANIZED HEALTH CARE EDUCATION/TRAINING PROGRAM

## 2024-05-05 PROCEDURE — 36415 COLL VENOUS BLD VENIPUNCTURE: CPT | Performed by: STUDENT IN AN ORGANIZED HEALTH CARE EDUCATION/TRAINING PROGRAM

## 2024-05-05 PROCEDURE — 99233 SBSQ HOSP IP/OBS HIGH 50: CPT | Performed by: STUDENT IN AN ORGANIZED HEALTH CARE EDUCATION/TRAINING PROGRAM

## 2024-05-05 PROCEDURE — 2500000001 HC RX 250 WO HCPCS SELF ADMINISTERED DRUGS (ALT 637 FOR MEDICARE OP): Performed by: PSYCHIATRY & NEUROLOGY

## 2024-05-05 RX ORDER — TRAZODONE HYDROCHLORIDE 50 MG/1
25 TABLET ORAL NIGHTLY PRN
Status: DISCONTINUED | OUTPATIENT
Start: 2024-05-05 | End: 2024-05-08 | Stop reason: HOSPADM

## 2024-05-05 RX ORDER — NITROFURANTOIN 25; 75 MG/1; MG/1
100 CAPSULE ORAL EVERY 12 HOURS SCHEDULED
Status: COMPLETED | OUTPATIENT
Start: 2024-05-06 | End: 2024-05-07

## 2024-05-05 RX ORDER — ARIPIPRAZOLE 5 MG/1
5 TABLET ORAL DAILY
Status: DISCONTINUED | OUTPATIENT
Start: 2024-05-05 | End: 2024-05-08 | Stop reason: HOSPADM

## 2024-05-05 RX ORDER — POTASSIUM CHLORIDE 20 MEQ/1
20 TABLET, EXTENDED RELEASE ORAL ONCE
Status: COMPLETED | OUTPATIENT
Start: 2024-05-05 | End: 2024-05-05

## 2024-05-05 RX ADMIN — ACETAMINOPHEN 650 MG: 325 TABLET ORAL at 03:32

## 2024-05-05 RX ADMIN — VALSARTAN 80 MG: 80 TABLET, FILM COATED ORAL at 08:22

## 2024-05-05 RX ADMIN — ROSUVASTATIN CALCIUM 20 MG: 20 TABLET, FILM COATED ORAL at 22:00

## 2024-05-05 RX ADMIN — METOPROLOL TARTRATE 25 MG: 25 TABLET, FILM COATED ORAL at 08:22

## 2024-05-05 RX ADMIN — ACETAMINOPHEN 650 MG: 325 TABLET ORAL at 22:01

## 2024-05-05 RX ADMIN — ARIPIPRAZOLE 5 MG: 5 TABLET ORAL at 21:59

## 2024-05-05 RX ADMIN — FLECAINIDE ACETATE 50 MG: 50 TABLET ORAL at 22:25

## 2024-05-05 RX ADMIN — MIRTAZAPINE 7.5 MG: 15 TABLET, FILM COATED ORAL at 22:00

## 2024-05-05 RX ADMIN — TRAMADOL HYDROCHLORIDE 50 MG: 50 TABLET, COATED ORAL at 08:31

## 2024-05-05 RX ADMIN — PANTOPRAZOLE SODIUM 40 MG: 40 TABLET, DELAYED RELEASE ORAL at 06:02

## 2024-05-05 RX ADMIN — METOPROLOL TARTRATE 25 MG: 25 TABLET, FILM COATED ORAL at 22:00

## 2024-05-05 RX ADMIN — POTASSIUM CHLORIDE 20 MEQ: 1500 TABLET, EXTENDED RELEASE ORAL at 14:26

## 2024-05-05 RX ADMIN — FLECAINIDE ACETATE 50 MG: 50 TABLET ORAL at 08:22

## 2024-05-05 RX ADMIN — CEFTRIAXONE SODIUM 1 G: 1 INJECTION, SOLUTION INTRAVENOUS at 11:03

## 2024-05-05 RX ADMIN — Medication 6 MG: at 22:00

## 2024-05-05 RX ADMIN — ASPIRIN 81 MG: 81 TABLET, COATED ORAL at 08:22

## 2024-05-05 ASSESSMENT — PAIN SCALES - GENERAL
PAINLEVEL_OUTOF10: 2
PAINLEVEL_OUTOF10: 3
PAINLEVEL_OUTOF10: 5 - MODERATE PAIN

## 2024-05-05 ASSESSMENT — COGNITIVE AND FUNCTIONAL STATUS - GENERAL
TOILETING: A LITTLE
MOBILITY SCORE: 17
MOVING FROM LYING ON BACK TO SITTING ON SIDE OF FLAT BED WITH BEDRAILS: A LITTLE
MOVING TO AND FROM BED TO CHAIR: A LITTLE
CLIMB 3 TO 5 STEPS WITH RAILING: A LOT
MOVING TO AND FROM BED TO CHAIR: A LITTLE
MOVING FROM LYING ON BACK TO SITTING ON SIDE OF FLAT BED WITH BEDRAILS: A LITTLE
DRESSING REGULAR LOWER BODY CLOTHING: A LITTLE
WALKING IN HOSPITAL ROOM: A LITTLE
HELP NEEDED FOR BATHING: A LITTLE
STANDING UP FROM CHAIR USING ARMS: A LITTLE
TURNING FROM BACK TO SIDE WHILE IN FLAT BAD: A LITTLE
HELP NEEDED FOR BATHING: A LITTLE
DAILY ACTIVITIY SCORE: 19
CLIMB 3 TO 5 STEPS WITH RAILING: A LOT
TURNING FROM BACK TO SIDE WHILE IN FLAT BAD: A LITTLE
WALKING IN HOSPITAL ROOM: A LITTLE
MOBILITY SCORE: 17
DAILY ACTIVITIY SCORE: 19
PERSONAL GROOMING: A LITTLE
STANDING UP FROM CHAIR USING ARMS: A LITTLE
DRESSING REGULAR UPPER BODY CLOTHING: A LITTLE
TOILETING: A LITTLE
DRESSING REGULAR UPPER BODY CLOTHING: A LITTLE
DRESSING REGULAR LOWER BODY CLOTHING: A LITTLE
PERSONAL GROOMING: A LITTLE

## 2024-05-05 ASSESSMENT — PAIN DESCRIPTION - LOCATION
LOCATION: KNEE
LOCATION: BACK

## 2024-05-05 ASSESSMENT — PAIN - FUNCTIONAL ASSESSMENT
PAIN_FUNCTIONAL_ASSESSMENT: 0-10
PAIN_FUNCTIONAL_ASSESSMENT: FLACC (FACE, LEGS, ACTIVITY, CRY, CONSOLABILITY)

## 2024-05-05 NOTE — PROGRESS NOTES
"Theodora Goel is a 78 y.o. female on day 0 of admission presenting with Weakness.    Subjective   Patient seen and examined at bedside.  Pt reports she is doing ok today, but felt like she was having delusions again overnight.  She states she does not think she slept well but is unsure if these were dreams or hallucinations.  She states that she was seeing a lot of people in black outside her room and she was yelling for help but no one was coming.  She talks about other delusions/hallucinations she was having at home prior to arrival that seem to have a similar concerns surrounding illness and death possibly related to her friend's terminal cancer diagnosis.  She reports that it is very disconcerting and is scary for her.  Son Yoav is at bedside.  All questions were answered.     Of note in the cardiology note from 5/4, there is a section under past cardiac history that states \"I am also concerned given her symptoms that are very suggestive of TIA possibly small stroke that she has had A-fib and embolic phenomenon if A-fib is present and or if there are multiple embolic events on the MRI would institute full anticoagulation.\" Per chart review, this is copied from a cardiology note from 11/14/23 and I do not believe it is regarding the current hospitalization. I will notify cardiology team.       Objective     Physical Exam    Constitutional: Well developed, no distress, alert and cooperative  Skin: Warm and dry, scattered RLE bruising   Eyes: EOMI, clear sclera  ENMT: mucous membranes moist  Respiratory: Patent airways, CTAB  Cardiovascular: Regular, rate and rhythm  Abdominal: Nondistended, soft, non-tender, +BS  MSK: ROM intact, mild LLE swelling  Neuro: alert and oriented x3    Last Recorded Vitals  Blood pressure 158/75, pulse 65, temperature 36.4 °C (97.5 °F), temperature source Temporal, resp. rate 16, height 1.676 m (5' 5.98\"), weight 72.8 kg (160 lb 7.9 oz), SpO2 97%.  Intake/Output last 3 " Shifts:  I/O last 3 completed shifts:  In: 630 (8.7 mL/kg) [P.O.:480; I.V.:50 (0.7 mL/kg); IV Piggyback:100]  Out: 1350 (18.5 mL/kg) [Urine:1350 (0.5 mL/kg/hr)]  Weight: 72.8 kg     Relevant Results  Scheduled medications  aspirin, 81 mg, oral, Daily  cefTRIAXone, 1 g, intravenous, q24h  flecainide, 50 mg, oral, q12h GWYN  lidocaine, 1 patch, transdermal, Daily  metoprolol tartrate, 25 mg, oral, BID  mirtazapine, 7.5 mg, oral, Nightly  pantoprazole, 40 mg, oral, Daily before breakfast   Or  pantoprazole, 40 mg, intravenous, Daily before breakfast  rosuvastatin, 20 mg, oral, Nightly  valsartan, 80 mg, oral, Daily      Continuous medications     PRN medications  PRN medications: acetaminophen **OR** [DISCONTINUED] acetaminophen **OR** [DISCONTINUED] acetaminophen, acetaminophen **OR** [DISCONTINUED] acetaminophen **OR** [DISCONTINUED] acetaminophen, ALPRAZolam, melatonin, metoprolol, ondansetron ODT **OR** ondansetron, polyethylene glycol, traMADol  Results from last 7 days   Lab Units 05/05/24 0527 05/04/24 0518 05/03/24  0624   WBC AUTO x10*3/uL 5.4 4.2* 3.5*   RBC AUTO x10*6/uL 3.44* 3.21* 3.11*   HEMOGLOBIN g/dL 10.9* 10.3* 10.0*     Results from last 7 days   Lab Units 05/05/24 0527 05/04/24 0518 05/03/24  0624 05/02/24  1825   SODIUM mmol/L 141 141 143 142   POTASSIUM mmol/L 3.8 3.4* 3.2* 3.6   CHLORIDE mmol/L 107 109* 109* 108*   CO2 mmol/L 27 26 26 27   BUN mg/dL 26* 19 19 26*   CREATININE mg/dL 0.64 0.63 0.50 0.62   CALCIUM mg/dL 9.2 9.1 8.9 9.3   PHOSPHORUS mg/dL 4.4 4.4  --   --    MAGNESIUM mg/dL 1.76 1.59*  --   --    BILIRUBIN TOTAL mg/dL  --   --  0.4 0.3   ALT U/L  --   --  17 21   AST U/L  --   --  21 27       Electrocardiogram, 12-lead PRN ACS symptoms    Result Date: 5/4/2024  Supraventricular tachycardia Left axis deviation Late transition Abnormal ECG When compared with ECG of 02-MAY-2024 19:58, (unconfirmed) RI interval has decreased Vent. rate has increased BY  91 BPM Confirmed by Pancho,  Rubin ([id number]) on 5/4/2024 3:18:11 PM    Vascular US lower extremity venous duplex bilateral    Result Date: 5/4/2024  Preliminary Cardiology Report            [facility] [address] [id number]     Tel [phone] Fax [phone]          Preliminary Vascular Lab Report  VASC US LOWER EXTREMITY VENOUS DUPLEX BILATERAL  Patient Name:     LUCIANO VERGARA        Reading Physician: [id number] Katiuska TSE MD Study Date:       5/4/2024            Ordering Provider: [id number] FARZANA FLORES MRN/PID:          [id number]            Fellow: Accession#:       [id number]        Technologist:      Martine Pittman RVJU YOB: 1945          Technologist 2: Gender:           F                   Encounter#:        [id number] Admission Status: Inpatient           Location           [facility]                                       Performed:  Diagnosis/ICD: Localized swelling, mass and lump, neck-R22.1 Indication:    Limb swelling CPT Codes:     49384 Peripheral venous duplex scan for DVT complete  PRELIMINARY CONCLUSIONS:  Right Lower Venous: No evidence of acute deep vein thrombus visualized in the right lower extremity. Left Lower Venous: No evidence of acute deep vein thrombus visualized in the left lower extremity.  Imaging & Doppler Findings:  Right                 Compressible Thrombus        Flow Distal External Iliac     Yes        None   Spontaneous/Phasic CFV                       Yes        None   Spontaneous/Phasic PFV                       Yes        None FV Proximal               Yes        None   Spontaneous/Phasic FV Mid                    Yes        None FV Distal                 Yes        None Popliteal                 Yes        None   Spontaneous/Phasic Peroneal                  Yes        None PTV                       Yes        None  Left                  Compress Thrombus        Flow Distal External Iliac    Yes      None   Spontaneous/Phasic CFV                     Yes      None   Spontaneous/Phasic PFV                     Yes      None FV Proximal             Yes      None   Spontaneous/Phasic FV Mid                  Yes      None FV Distal               Yes      None Popliteal               Yes      None   Spontaneous/Phasic Peroneal                Yes      None PTV                     Yes      None VASCULAR PRELIMINARY REPORT completed by Martine Pittman RVT on 5/4/2024 at 3:03:52 PM  ** Final **       Assessment/Plan   Principal Problem:    Weakness  Active Problems:    Anxiety    Recurrent major depressive disorder (CMS-[facility])    Urinary incontinence    Generalized weakness    -wbc 5.4  -UA +   -f/u Ucx -pansensitive E. coli  -continue rocephin  -oriented x3, family concerned for worsening depression, ?underlying dementia   -psych consulted   -palliative consulted   -holding home tramadol   -d/c xanax/ambien   -continue remeron and melatonin   -long run SVT 5/3, resolved with 5mg IV metop; does have Hx of Afib/SVT with loop recorder  -EP consulted   -started on flecanide 50mg BID and metop 25mg BID  -echo from Jan 2024 reviewed   -PT/OT, plan for SNF   -LE dopplers neg for DVTs  -lidocaine patch for L knee   -PRN tylenol and home ultram   -continue home meds     FENGI   -encourage oral hydration   -replete electrolytes PRN  -cardiac diet   -GI ppx: ppi  -DVT ppx: SCDs    Dispo: This is a 78-year-old female who presented with reported confusion and concern for urinary tract infection.  UA was mildly positive and patient was started on antibiotics.  Pt's mentation is currently A&Ox3. Family also reported concern for depression with hallucinations.  Psychiatry and palliative care consulted.  PT OT. Plan for SNF on d/c, precert pending. EP consulted for SVT, started on metop and flecanide.  Anticipate discharge to SNF in 24-48hrs.     This note is created using voice recognition software. All efforts are made to minimize  errors, if there are errors there due to transcription.    Shraddha Gupta, DO  Hospitalist

## 2024-05-05 NOTE — NURSING NOTE
1600- Patient restless, confused and removed own IV    1700- Patient angry and refusing new IV. Patient demands staff to leave her room. Attending notified.     1722- Family at bedside. Son Yoav spoke with Dr. Gupta and all questions answered. Psychiatry to round this evening.    1800- Patient continuing to refuse vital signs.

## 2024-05-05 NOTE — PROGRESS NOTES
Subjective Data:  Seems very confused, disoriented today.  Laying flat in bed.  Does not report any chest discomfort.    Overnight Events:    No arrhythmias overnight     PMH :  Dizziness  Palpitations  Unsteady  SVT (supraventricular tachycardia)  Paroxysmal atrial fibrillation (CMS/HCC)  TIA (transient ischemic attack)    Echocardiogram 6/2/2022 normal study  March 2021 Lexiscan perfusion study normal        Loop recorder assessments 1/24/2023: 112-second episode of tachycardia 1 to 2-second pauses.  One 6-second episode sinus rhythm 42 December 2022 20 episodes of tachycardia consistent with SVT 7-19 seconds in duration.     H/O TIA      H/O fall     ECHO 1/2024 : 1. Left ventricular systolic function is normal with a 65% estimated ejection fraction.   2. Spectral Doppler shows an impaired relaxation pattern of left ventricular diastolic filling.   3. There is moderate concentric left ventricular hypertrophy.   4. Normal chamber sizes.   5. No significant valvular heart disease.   6. No significant change from previous study of June 2022.RVSP 29mm Hg.LA 3.5cm     Objective Data:  Last Recorded Vitals:  Vitals:    05/05/24 0400 05/05/24 0401 05/05/24 0402 05/05/24 0800   BP: 158/79 171/90 (!) 192/99 158/75   BP Location: Left arm Left arm Left arm Left arm   Patient Position: Lying Sitting Standing Lying   Pulse: 84 85 94 65   Resp:    16   Temp:    36.4 °C (97.5 °F)   TempSrc:    Temporal   SpO2: 96% 98% 99% 97%   Weight:       Height:           Last Labs:  CBC - 5/5/2024:  5:27 AM  5.4 10.9 197    35.0      CMP - 5/5/2024:  5:27 AM  9.2 5.6 21 --- 0.4   4.4 3.3 17 54      PTT - No results in last year.  1.0   10.8 _     TROPHS   Date/Time Value Ref Range Status   05/02/2024 06:25 PM 5 0 - 13 ng/L Final     BNP   Date/Time Value Ref Range Status   11/14/2023 09:47 AM 29 0 - 99 pg/mL Final   05/02/2022 09:39  0 - 99 pg/mL Final     Comment:     .  <100 pg/mL - Heart failure unlikely  100-299 pg/mL  - Intermediate probability of acute heart  .               failure exacerbation. Correlate with clinical  .               context and patient history.    >=300 pg/mL - Heart Failure likely. Correlate with clinical  .               context and patient history.  BNP testing is performed using different testing   methodology at [facility] than at other   [facility] hospitals. Direct result comparisons should   only be made within the same method.       HGBA1C   Date/Time Value Ref Range Status   01/18/2024 01:30 PM 5.1 see below % Final   08/16/2022 12:13 PM 4.6 % Final     Comment:          Diagnosis of Diabetes-Adults   Non-Diabetic: < or = 5.6%   Increased risk for developing diabetes: 5.7-6.4%   Diagnostic of diabetes: > or = 6.5%  .       Monitoring of Diabetes                Age (y)     Therapeutic Goal (%)   Adults:          >18           <7.0   Pediatrics:    13-18           <7.5                   7-12           <8.0                   0- 6            7.5-8.5   American Diabetes Association. Diabetes Care 33(S1), Jan 2010.     01/28/2022 11:12 AM 5.3 % Final     Comment:          Diagnosis of Diabetes-Adults   Non-Diabetic: < or = 5.6%   Increased risk for developing diabetes: 5.7-6.4%   Diagnostic of diabetes: > or = 6.5%  .       Monitoring of Diabetes                Age (y)     Therapeutic Goal (%)   Adults:          >18           <7.0   Pediatrics:    13-18           <7.5                   7-12           <8.0                   0- 6            7.5-8.5   American Diabetes Association. Diabetes Care 33(S1), Jan 2010.       LDLCALC   Date/Time Value Ref Range Status   01/18/2024 01:30 PM 49 <=99 mg/dL Final     Comment:                                 Near   Borderline      AGE      Desirable  Optimal    High     High     Very High     0-19 Y     0 - 109     ---    110-129   >/= 130     ----    20-24 Y     0 - 119     ---    120-159   >/= 160     ----      >24 Y     0 -  99   100-129  130-159   160-189      >/=190     10/24/2023 01:15 PM 26 <=99 mg/dL Final     Comment:                                 Near   Borderline      AGE      Desirable  Optimal    High     High     Very High     0-19 Y     0 - 109     ---    110-129   >/= 130     ----    20-24 Y     0 - 119     ---    120-159   >/= 160     ----      >24 Y     0 -  99   100-129  130-159   160-189     >/=190       VLDL   Date/Time Value Ref Range Status   01/18/2024 01:30 PM 18 0 - 40 mg/dL Final   10/24/2023 01:15 PM 25 0 - 40 mg/dL Final   08/16/2023 12:37 PM 23 0 - 40 mg/dL Final   05/02/2022 09:39 AM 20 0 - 40 mg/dL Final   01/28/2022 11:12 AM 30 0 - 40 mg/dL Final      Last I/O:  I/O last 3 completed shifts:  In: 630 (8.7 mL/kg) [P.O.:480; I.V.:50 (0.7 mL/kg); IV Piggyback:100]  Out: 1350 (18.5 mL/kg) [Urine:1350 (0.5 mL/kg/hr)]  Weight: 72.8 kg       Inpatient Medications:  Scheduled medications   Medication Dose Route Frequency    ARIPiprazole  5 mg oral Daily    aspirin  81 mg oral Daily    flecainide  50 mg oral q12h [facility]    lidocaine  1 patch transdermal Daily    metoprolol tartrate  25 mg oral BID    mirtazapine  7.5 mg oral Nightly    [START ON 5/6/2024] nitrofurantoin (macrocrystal-monohydrate)  100 mg oral q12h [facility]    pantoprazole  40 mg oral Daily before breakfast    Or    pantoprazole  40 mg intravenous Daily before breakfast    rosuvastatin  20 mg oral Nightly    valsartan  80 mg oral Daily     PRN medications   Medication    acetaminophen    acetaminophen    melatonin    metoprolol    ondansetron ODT    Or    ondansetron    polyethylene glycol     Continuous Medications   Medication Dose Last Rate   Physical Exam:  Lungs are clear  Heart sounds are regular without murmur rub or gallop  Extremities show no edema  Confused and disoriented     Assessment/Plan   Patient with multiple somatic symptoms  Telemetry showed narrow complex irregular tachycardia consistent with AV lynda reentrant tachycardia  She has a history of PSVT and possibly atrial  fibrillation, latter is unclear.  Dr. Martins her primary cardiologist is working hard.  Preserved LV systolic function based on echo January 2024, with moderate concentric left ventricular hypertrophy RVSP 29 mmHg  Negative Lexiscan Myoview March 2021.  Sinus bradycardia also noted on loop recorder.  Patient was started on flecainide 50 mg p.o. twice daily  She will continue metoprolol  Her initial EKG showed first-degree AV block AR interval 220 range, today's EKG shows normalization of AR interval.  Psychiatry is following her and making recommendations as well.  She can be discharged from cardiac standpoint if telemetry continues to be unremarkable.  Outpatient follow-up will be with Dr. Eric Martins     Code Status:  Full Code      Angela Garnica MD

## 2024-05-05 NOTE — CONSULTS
Consults  History Of Present Illness:    Theodora Goel is a 78 y.o. female presenting with weakness.  Cardiology is consulted because of supraventricular tachycardia noted on telemetry, resolved with 5 mg of IV metoprolol, patient was asymptomatic.  Patient has a loop recorder in place.  At last hospital admission her atenolol was discontinued..  Primary cardiologist is Dr. Eric Martins.  She presented with confusion and concern for urinary tract infection.  UA was mildly positive and she has been started on antibiotics.  There is concern for depression.  Patient is under situational stress due to close friend dying of cancer  She has found it increasingly difficult to carry on her activities of daily living.  She sometimes feels that her legs gave way and they feel weak.  She has a dizziness with changes in posture.  She has had 1 or 2 falls.  She denies chest pressure tightness or heaviness  Review of systems is negative or noncontributory except as noted    Past Cardiac history :  Dizziness  Palpitations  Unsteady  SVT (supraventricular tachycardia)  Paroxysmal atrial fibrillation (CMS/HCC)  TIA (transient ischemic attack)    -she has history of both SVT and atrial fibrillation.  It is important to have a cardiac event monitor to determine exactly which or if both dysrhythmias are contributing to her symptoms.  I am also concerned given her symptoms that are very suggestive of TIA possibly small stroke that she has had A-fib and embolic phenomenon if A-fib is present and or if there are multiple embolic events on the MRI would institute full anticoagulation.  Also important to assure thyroid or other metabolic issues are not contributing to dysrhythmia and She had had a loop recorder placed for evaluation of SVT and prior atrial fibrillation and on a device check described March 23, 2020 had had episodes of atrial fibrillation.  1 episode lasting for over 3 hours.  Over the subsequent year however burden  was felt to be quite low and anticoagulation was not instituted.     Echocardiogram 6/2/2022 normal study  March 2021 Lexiscan perfusion study normal        Loop recorder assessments 1/24/2023: 112-second episode of tachycardia 1 to 2-second pauses.  One 6-second episode sinus rhythm 42            December 2022 20 episodes of tachycardia consistent with SVT 7-19 seconds in duration.     H/O TIA      H/O fall    ECHO 1/2024 : 1. Left ventricular systolic function is normal with a 65% estimated ejection fraction.   2. Spectral Doppler shows an impaired relaxation pattern of left ventricular diastolic filling.   3. There is moderate concentric left ventricular hypertrophy.   4. Normal chamber sizes.   5. No significant valvular heart disease.   6. No significant change from previous study of June 2022.RVSP 29mm Hg.LA 3.5cm     Last Recorded Vitals:  Vitals:    05/04/24 0400 05/04/24 0800 05/04/24 1600 05/04/24 1951   BP: 160/80 160/72 167/70 168/73   BP Location: Left arm Right arm Right arm Left arm   Patient Position: Lying Lying Lying Lying   Pulse: 73 75 69 78   Resp: 16 15 17 20   Temp: 35.5 °C (95.9 °F) 36.3 °C (97.3 °F) 36.6 °C (97.9 °F) 36 °C (96.8 °F)   TempSrc: Temporal Temporal Temporal    SpO2: 98% 95% 99% 98%   Weight:       Height:           Last Labs:  CBC - 5/4/2024:  5:18 AM  4.2 10.3 184    32.8      CMP - 5/4/2024:  5:18 AM  9.1 5.6 21 --- 0.4   4.4 3.3 17 54      PTT - No results in last year.  1.0   10.8 _     Troponin I, High Sensitivity   Date/Time Value Ref Range Status   05/02/2024 06:25 PM 5 0 - 13 ng/L Final     BNP   Date/Time Value Ref Range Status   11/14/2023 09:47 AM 29 0 - 99 pg/mL Final   05/02/2022 09:39  (H) 0 - 99 pg/mL Final     Comment:     .  <100 pg/mL - Heart failure unlikely  100-299 pg/mL - Intermediate probability of acute heart  .               failure exacerbation. Correlate with clinical  .               context and patient history.    >=300 pg/mL - Heart Failure  likely. Correlate with clinical  .               context and patient history.  BNP testing is performed using different testing   methodology at [facility] than at other   [facility] hospitals. Direct result comparisons should   only be made within the same method.       Hemoglobin A1C   Date/Time Value Ref Range Status   01/18/2024 01:30 PM 5.1 see below % Final   08/16/2022 12:13 PM 4.6 % Final     Comment:          Diagnosis of Diabetes-Adults   Non-Diabetic: < or = 5.6%   Increased risk for developing diabetes: 5.7-6.4%   Diagnostic of diabetes: > or = 6.5%  .       Monitoring of Diabetes                Age (y)     Therapeutic Goal (%)   Adults:          >18           <7.0   Pediatrics:    13-18           <7.5                   7-12           <8.0                   0- 6            7.5-8.5   American Diabetes Association. Diabetes Care 33(S1), Jan 2010.     01/28/2022 11:12 AM 5.3 % Final     Comment:          Diagnosis of Diabetes-Adults   Non-Diabetic: < or = 5.6%   Increased risk for developing diabetes: 5.7-6.4%   Diagnostic of diabetes: > or = 6.5%  .       Monitoring of Diabetes                Age (y)     Therapeutic Goal (%)   Adults:          >18           <7.0   Pediatrics:    13-18           <7.5                   7-12           <8.0                   0- 6            7.5-8.5   American Diabetes Association. Diabetes Care 33(S1), Jan 2010.       LDL Calculated   Date/Time Value Ref Range Status   01/18/2024 01:30 PM 49 <=99 mg/dL Final     Comment:                                 Near   Borderline      AGE      Desirable  Optimal    High     High     Very High     0-19 Y     0 - 109     ---    110-129   >/= 130     ----    20-24 Y     0 - 119     ---    120-159   >/= 160     ----      >24 Y     0 -  99   100-129  130-159   160-189     >/=190     10/24/2023 01:15 PM 26 <=99 mg/dL Final     Comment:                                 Near   Borderline      AGE      Desirable  Optimal    High      High     Very High     0-19 Y     0 - 109     ---    110-129   >/= 130     ----    20-24 Y     0 - 119     ---    120-159   >/= 160     ----      >24 Y     0 -  99   100-129  130-159   160-189     >/=190       VLDL   Date/Time Value Ref Range Status   01/18/2024 01:30 PM 18 0 - 40 mg/dL Final   10/24/2023 01:15 PM 25 0 - 40 mg/dL Final   08/16/2023 12:37 PM 23 0 - 40 mg/dL Final   05/02/2022 09:39 AM 20 0 - 40 mg/dL Final   01/28/2022 11:12 AM 30 0 - 40 mg/dL Final      Last I/O:  I/O last 3 completed shifts:  In: 1265 (17.4 mL/kg) [P.O.:480; I.V.:635 (8.7 mL/kg); IV Piggyback:150]  Out: 1300 (17.9 mL/kg) [Urine:1300 (0.5 mL/kg/hr)]  Weight: 72.8 kg     Past Cardiology Tests (Last 3 Years):  EKG:  Electrocardiogram, 12-lead PRN ACS symptoms 05/03/2024      ECG 12 Lead 05/02/2024 (Preliminary)    Echo:  Transthoracic Echo (TTE) Complete 01/18/2024    Ejection Fractions:  EF   Date/Time Value Ref Range Status   01/18/2024 03:02 PM 75 %      Cath:  No results found for this or any previous visit from the past 1095 days.    Stress Test:  No results found for this or any previous visit from the past 1095 days.    Cardiac Imaging:  No results found for this or any previous visit from the past 1095 days.      Past Medical History:  She has a past medical history of Asymptomatic menopausal state, Encounter for other screening for malignant neoplasm of breast (06/24/2022), Encounter for screening for malignant neoplasm of colon (06/24/2022), Essential (primary) hypertension, History of falling (06/24/2022), Old myocardial infarction, Other conditions influencing health status, Other dysphagia (01/28/2022), Palpitations (06/02/2022), Personal history of diseases of the skin and subcutaneous tissue, Personal history of diseases of the skin and subcutaneous tissue (06/18/2021), Personal history of other diseases of the circulatory system (11/03/2015), Personal history of other diseases of the musculoskeletal system and  connective tissue, Personal history of other diseases of urinary system, Personal history of other endocrine, nutritional and metabolic disease, Personal history of other mental and behavioral disorders (06/18/2021), Personal history of other specified conditions, Personal history of other specified conditions, Personal history of other specified conditions, Polyp of corpus uteri, Tachycardia, unspecified, and Unspecified asthma with (acute) exacerbation ([facility]).    Past Surgical History:  She has a past surgical history that includes Nose surgery (11/02/2018); Other surgical history (11/19/2021); and Other surgical history (01/28/2022).      Social History:  She reports that she quit smoking about 34 years ago. Her smoking use included cigarettes. She has never used smokeless tobacco. She reports current alcohol use. She reports that she does not use drugs.    Family History:  Family History   Problem Relation Name Age of Onset    Coronary artery disease Mother      Atrial fibrillation Mother      Hypertension Mother      Heart attack Mother      Stroke Mother      Coronary artery disease Father      Coronary artery disease Sister      Hypertension Sister      Colon cancer Paternal Grandfather  70        Allergies:  Patient has no known allergies.    Inpatient Medications:  Scheduled medications   Medication Dose Route Frequency    aspirin  81 mg oral Daily    cefTRIAXone  1 g intravenous q24h    lidocaine  1 patch transdermal Daily    mirtazapine  7.5 mg oral Nightly    pantoprazole  40 mg oral Daily before breakfast    Or    pantoprazole  40 mg intravenous Daily before breakfast    rosuvastatin  20 mg oral Nightly    valsartan  80 mg oral Daily     PRN medications   Medication    acetaminophen    acetaminophen    ALPRAZolam    melatonin    metoprolol    ondansetron ODT    Or    ondansetron    polyethylene glycol    traMADol     Continuous Medications   Medication Dose Last Rate     Outpatient  Medications:  Current Outpatient Medications   Medication Instructions    albuterol 90 mcg/actuation inhaler 2 puffs, inhalation, Every 4 hours PRN    alendronate (FOSAMAX) 70 mg, oral, Every 7 days, Take in the morning with a full glass of water, on an empty stomach, and do not take anything else by mouth or lie down for the next 30 min.    ALPRAZolam (XANAX) 0.25 mg, oral, 2 times daily PRN    aspirin 81 mg, oral, Daily    atenolol (TENORMIN) 50 mg, oral, Daily    buPROPion XL (WELLBUTRIN XL) 150 mg, oral, 2 times daily    DULoxetine (CYMBALTA) 20 mg, oral, Daily, Swallow whole. Do not crush or chew.    ketoconazole (NIZOral) 2 % shampoo Apply once to face prn rash    levothyroxine (SYNTHROID, LEVOXYL) 125 mcg, oral, Daily    rosuvastatin (CRESTOR) 20 mg, oral, Nightly    traMADol (ULTRAM) 50 mg, oral, 2 times daily PRN    valsartan (DIOVAN) 160 mg, oral, Daily PRN    valsartan (DIOVAN) 80 mg, oral, Daily PRN    zolpidem (AMBIEN) 5 mg, oral, Nightly PRN     On physical examination she is laying flat in bed.  She is speaking in full sentences.  Affect is flat.  No jugular venous distention no carotid bruit.  Lungs are clear.  Heart sounds are regular with grade 2/6 crescendo decrescendo murmur along the left sternal border.  No rub or gallop.  Abdomen is soft and extremities show no edema neurologically she is grossly nonfocal     Assessment/Plan   Multiple somatic symptoms most striking of which is progressive decline in functional status and situational stressors due to friend dying of cancer.  Telemetry reviewed, patient did have a long run of narrow complex tachycardia regular, most consistent with reentrant tachycardia involving the AV node.  Patient has labile blood pressure, she reports that at times her blood pressure will drop into the 80s and even lower.  She has hypokalemia.  She had an echo in January 2024 that showed preserved LV systolic function grossly normal valves and normal RV systolic  pressure  EKG shows first-degree AV block normal QRS duration and QTc    Recommendations are that we start flecainide 50 mg p.o. twice daily  Continue to observe her on telemetry  Check orthostatics, and if she is orthostatic she may need Florinef  Replete potassium to keep serum potassium greater than 4  Titrate blood pressure medications to upright blood pressure  No indication to repeat echocardiogram this admission  She had a previous perfusion imaging study that was negative for ischemia      Peripheral IV 05/03/24 22 G Right;Dorsal Wrist (Active)   Site Assessment Clean;Dry;Intact 05/04/24 1947   Dressing Status Clean;Dry 05/04/24 1947   Number of days: 1       Code Status:  Full Code    Thank you for allowing me to participate in patient's care, please do not hesitate to call if further questions arise,  Sincerely,    Angela Garnica MD [facility]        Angela Garnica MD

## 2024-05-06 LAB
ANION GAP SERPL CALC-SCNC: 12 MMOL/L (ref 10–20)
BUN SERPL-MCNC: 34 MG/DL (ref 6–23)
CALCIUM SERPL-MCNC: 9.5 MG/DL (ref 8.6–10.3)
CHLORIDE SERPL-SCNC: 106 MMOL/L (ref 98–107)
CO2 SERPL-SCNC: 28 MMOL/L (ref 21–32)
CREAT SERPL-MCNC: 0.87 MG/DL (ref 0.5–1.05)
EGFRCR SERPLBLD CKD-EPI 2021: 68 ML/MIN/1.73M*2
ERYTHROCYTE [DISTWIDTH] IN BLOOD BY AUTOMATED COUNT: 12.9 % (ref 11.5–14.5)
GLUCOSE SERPL-MCNC: 88 MG/DL (ref 74–99)
HCT VFR BLD AUTO: 36.3 % (ref 36–46)
HGB BLD-MCNC: 11.2 G/DL (ref 12–16)
MAGNESIUM SERPL-MCNC: 1.83 MG/DL (ref 1.6–2.4)
MCH RBC QN AUTO: 31.8 PG (ref 26–34)
MCHC RBC AUTO-ENTMCNC: 30.9 G/DL (ref 32–36)
MCV RBC AUTO: 103 FL (ref 80–100)
NRBC BLD-RTO: 0 /100 WBCS (ref 0–0)
PHOSPHATE SERPL-MCNC: 5.7 MG/DL (ref 2.5–4.9)
PLATELET # BLD AUTO: 196 X10*3/UL (ref 150–450)
POTASSIUM SERPL-SCNC: 4.1 MMOL/L (ref 3.5–5.3)
RBC # BLD AUTO: 3.52 X10*6/UL (ref 4–5.2)
SODIUM SERPL-SCNC: 142 MMOL/L (ref 136–145)
WBC # BLD AUTO: 5.3 X10*3/UL (ref 4.4–11.3)

## 2024-05-06 PROCEDURE — 80048 BASIC METABOLIC PNL TOTAL CA: CPT | Performed by: STUDENT IN AN ORGANIZED HEALTH CARE EDUCATION/TRAINING PROGRAM

## 2024-05-06 PROCEDURE — 1200000002 HC GENERAL ROOM WITH TELEMETRY DAILY

## 2024-05-06 PROCEDURE — 83735 ASSAY OF MAGNESIUM: CPT | Performed by: STUDENT IN AN ORGANIZED HEALTH CARE EDUCATION/TRAINING PROGRAM

## 2024-05-06 PROCEDURE — 2500000004 HC RX 250 GENERAL PHARMACY W/ HCPCS (ALT 636 FOR OP/ED): Performed by: NURSE PRACTITIONER

## 2024-05-06 PROCEDURE — 99232 SBSQ HOSP IP/OBS MODERATE 35: CPT | Performed by: NURSE PRACTITIONER

## 2024-05-06 PROCEDURE — 2500000006 HC RX 250 W HCPCS SELF ADMINISTERED DRUGS (ALT 637 FOR ALL PAYERS): Performed by: STUDENT IN AN ORGANIZED HEALTH CARE EDUCATION/TRAINING PROGRAM

## 2024-05-06 PROCEDURE — 97116 GAIT TRAINING THERAPY: CPT | Mod: GP,CQ

## 2024-05-06 PROCEDURE — 2500000001 HC RX 250 WO HCPCS SELF ADMINISTERED DRUGS (ALT 637 FOR MEDICARE OP): Performed by: REGISTERED NURSE

## 2024-05-06 PROCEDURE — 2500000001 HC RX 250 WO HCPCS SELF ADMINISTERED DRUGS (ALT 637 FOR MEDICARE OP): Performed by: PSYCHIATRY & NEUROLOGY

## 2024-05-06 PROCEDURE — 99222 1ST HOSP IP/OBS MODERATE 55: CPT | Performed by: INTERNAL MEDICINE

## 2024-05-06 PROCEDURE — 2500000001 HC RX 250 WO HCPCS SELF ADMINISTERED DRUGS (ALT 637 FOR MEDICARE OP): Performed by: INTERNAL MEDICINE

## 2024-05-06 PROCEDURE — 84100 ASSAY OF PHOSPHORUS: CPT | Performed by: STUDENT IN AN ORGANIZED HEALTH CARE EDUCATION/TRAINING PROGRAM

## 2024-05-06 PROCEDURE — 99232 SBSQ HOSP IP/OBS MODERATE 35: CPT | Performed by: INTERNAL MEDICINE

## 2024-05-06 PROCEDURE — 36415 COLL VENOUS BLD VENIPUNCTURE: CPT | Performed by: STUDENT IN AN ORGANIZED HEALTH CARE EDUCATION/TRAINING PROGRAM

## 2024-05-06 PROCEDURE — 2500000001 HC RX 250 WO HCPCS SELF ADMINISTERED DRUGS (ALT 637 FOR MEDICARE OP): Performed by: STUDENT IN AN ORGANIZED HEALTH CARE EDUCATION/TRAINING PROGRAM

## 2024-05-06 PROCEDURE — 85027 COMPLETE CBC AUTOMATED: CPT | Performed by: STUDENT IN AN ORGANIZED HEALTH CARE EDUCATION/TRAINING PROGRAM

## 2024-05-06 PROCEDURE — 97110 THERAPEUTIC EXERCISES: CPT | Mod: GP,CQ

## 2024-05-06 RX ORDER — LANOLIN ALCOHOL/MO/W.PET/CERES
800 CREAM (GRAM) TOPICAL ONCE
Status: COMPLETED | OUTPATIENT
Start: 2024-05-06 | End: 2024-05-06

## 2024-05-06 RX ADMIN — NITROFURANTOIN MONOHYDRATE/MACROCRYSTALS 100 MG: 25; 75 CAPSULE ORAL at 20:10

## 2024-05-06 RX ADMIN — MIRTAZAPINE 7.5 MG: 15 TABLET, FILM COATED ORAL at 20:10

## 2024-05-06 RX ADMIN — METOPROLOL TARTRATE 25 MG: 25 TABLET, FILM COATED ORAL at 20:10

## 2024-05-06 RX ADMIN — ARIPIPRAZOLE 5 MG: 5 TABLET ORAL at 09:10

## 2024-05-06 RX ADMIN — FLECAINIDE ACETATE 50 MG: 50 TABLET ORAL at 09:10

## 2024-05-06 RX ADMIN — VALSARTAN 80 MG: 80 TABLET, FILM COATED ORAL at 09:10

## 2024-05-06 RX ADMIN — ACETAMINOPHEN 650 MG: 325 TABLET ORAL at 21:51

## 2024-05-06 RX ADMIN — ROSUVASTATIN CALCIUM 20 MG: 20 TABLET, FILM COATED ORAL at 20:10

## 2024-05-06 RX ADMIN — PANTOPRAZOLE SODIUM 40 MG: 40 TABLET, DELAYED RELEASE ORAL at 06:16

## 2024-05-06 RX ADMIN — Medication 800 MG: at 13:46

## 2024-05-06 RX ADMIN — FLECAINIDE ACETATE 50 MG: 50 TABLET ORAL at 20:10

## 2024-05-06 RX ADMIN — ASPIRIN 81 MG: 81 TABLET, COATED ORAL at 09:10

## 2024-05-06 RX ADMIN — NITROFURANTOIN MONOHYDRATE/MACROCRYSTALS 100 MG: 25; 75 CAPSULE ORAL at 09:10

## 2024-05-06 ASSESSMENT — COGNITIVE AND FUNCTIONAL STATUS - GENERAL
TURNING FROM BACK TO SIDE WHILE IN FLAT BAD: A LITTLE
MOVING FROM LYING ON BACK TO SITTING ON SIDE OF FLAT BED WITH BEDRAILS: A LITTLE
DRESSING REGULAR LOWER BODY CLOTHING: A LITTLE
MOBILITY SCORE: 15
CLIMB 3 TO 5 STEPS WITH RAILING: TOTAL
TURNING FROM BACK TO SIDE WHILE IN FLAT BAD: A LITTLE
STANDING UP FROM CHAIR USING ARMS: A LOT
WALKING IN HOSPITAL ROOM: A LOT
STANDING UP FROM CHAIR USING ARMS: A LOT
TOILETING: A LITTLE
CLIMB 3 TO 5 STEPS WITH RAILING: TOTAL
DAILY ACTIVITIY SCORE: 20
STANDING UP FROM CHAIR USING ARMS: A LOT
MOBILITY SCORE: 13
HELP NEEDED FOR BATHING: A LITTLE
WALKING IN HOSPITAL ROOM: A LITTLE
WALKING IN HOSPITAL ROOM: A LITTLE
TURNING FROM BACK TO SIDE WHILE IN FLAT BAD: A LITTLE
MOVING TO AND FROM BED TO CHAIR: A LITTLE
CLIMB 3 TO 5 STEPS WITH RAILING: TOTAL
MOVING FROM LYING ON BACK TO SITTING ON SIDE OF FLAT BED WITH BEDRAILS: A LITTLE
DRESSING REGULAR LOWER BODY CLOTHING: A LITTLE
DRESSING REGULAR UPPER BODY CLOTHING: A LITTLE
TOILETING: A LITTLE
DRESSING REGULAR UPPER BODY CLOTHING: A LITTLE
MOVING TO AND FROM BED TO CHAIR: A LITTLE
MOVING TO AND FROM BED TO CHAIR: A LOT
MOBILITY SCORE: 15
DAILY ACTIVITIY SCORE: 20
MOVING FROM LYING ON BACK TO SITTING ON SIDE OF FLAT BED WITH BEDRAILS: A LITTLE
HELP NEEDED FOR BATHING: A LITTLE

## 2024-05-06 ASSESSMENT — PAIN - FUNCTIONAL ASSESSMENT
PAIN_FUNCTIONAL_ASSESSMENT: 0-10

## 2024-05-06 ASSESSMENT — PAIN SCALES - WONG BAKER: WONGBAKER_NUMERICALRESPONSE: NO HURT

## 2024-05-06 ASSESSMENT — PAIN SCALES - GENERAL
PAINLEVEL_OUTOF10: 0 - NO PAIN

## 2024-05-06 NOTE — NURSING NOTE
0710- Dr Gupta notified that patient has no IV. Asked if that was okay and if so to have an order that it is okay for patient to not have an IV.     1530- Patient feels nauseous and weak. Patient put back to bed.    1600- Checked on patient and she feels better now that she is laying down. Nausea resolved.    EOS- Patient orthostatic blood pressure negative and Dr Gupta notified. Orthostatic blood pressures discontinued. Patient up in the chair for a couple hours this afternoon. Family at bedside this afternoon. Safety maintained and call light within reach.

## 2024-05-06 NOTE — PROGRESS NOTES
05/06/24 1437   Discharge Planning   Home or Post Acute Services Post acute facilities (Rehab/SNF/etc)   Type of Post Acute Facility Services Skilled nursing   Patient expects to be discharged to: [facility]   Does the patient need discharge transport arranged? Yes   RoundTrip coordination needed? Yes     Patient has precert for [facility] SNF. Per MD, possibly ready for d/c tomorrow. Updated facility.

## 2024-05-06 NOTE — PROGRESS NOTES
PATIENT NAME:  Theodora Goel       MRN: [id number]     DATE of SERVICE: May 6, 2024      Primary Care Physician: Rose Kim MD      SUBJECTIVE: Doing well. Remain in sinus  on tele.  Per nursing, plan to discharge to home or assist living   Echo in 6/22 and 1/24 and nuclear stress test  3/21; normal   Loop recorder on 1/24/23;112 sec tachycardia. 1-2 sec pauses  Dr Deleon is consulted and saw the pt  today     Past Medical History:     Past Medical History:   Diagnosis Date    Asymptomatic menopausal state     Menopause    Encounter for other screening for malignant neoplasm of breast 06/24/2022    Breast screening    Encounter for screening for malignant neoplasm of colon 06/24/2022    Colon cancer screening    Essential (primary) hypertension     Hypertension, benign    History of falling 06/24/2022    H/O fall    Old myocardial infarction     History of myocardial infarction    Other conditions influencing health status     No significant past surgical history    Other dysphagia 01/28/2022    Other dysphagia    Palpitations 06/02/2022    Palpitations    Personal history of diseases of the skin and subcutaneous tissue     History of alopecia    Personal history of diseases of the skin and subcutaneous tissue 06/18/2021    History of eczema    Personal history of other diseases of the circulatory system 11/03/2015    History of supraventricular tachycardia    Personal history of other diseases of the musculoskeletal system and connective tissue     History of osteoarthritis    Personal history of other diseases of urinary system     History of renal failure    Personal history of other endocrine, nutritional and metabolic disease     History of hypothyroidism    Personal history of other mental and behavioral disorders 06/18/2021    History of anxiety    Personal history of other specified conditions     History of prolonged Q-T interval on ECG    Personal history of other specified conditions      History of shortness of breath    Personal history of other specified conditions     History of fatigue    Polyp of corpus uteri     Uterine polyp    Tachycardia, unspecified     Wide-complex tachycardia    Unspecified asthma with (acute) exacerbation ([facility])     Asthma exacerbation        Past Surgical History:     Past Surgical History:   Procedure Laterality Date    NOSE SURGERY  11/02/2018    Nose Surgery    OTHER SURGICAL HISTORY  11/19/2021    Loop recorder insertion    OTHER SURGICAL HISTORY  01/28/2022    Cardioverter defibrillator insertion        Allergies:      No Known Allergies     Medications:     Prior to Admission Medications   Prescriptions Last Dose Informant Patient Reported? Taking?   ALPRAZolam (Xanax) 0.25 mg tablet Past Week Family Member, Other No Yes   Sig: Take 1 tablet (0.25 mg) by mouth 2 times a day as needed for anxiety.   DULoxetine (Cymbalta) 20 mg DR capsule Not Taking Family Member, Other No No   Sig: Take 1 capsule (20 mg) by mouth once daily. Swallow whole. Do not crush or chew.   Patient not taking: Reported on 5/2/2024   albuterol 90 mcg/actuation inhaler Past Month Family Member, Other No Yes   Sig: Inhale 2 puffs every 4 hours if needed for shortness of breath or wheezing.   alendronate (Fosamax) 70 mg tablet Past Week Family Member, Other No Yes   Sig: Take 1 tablet (70 mg) by mouth every 7 days. Take in the morning with a full glass of water, on an empty stomach, and do not take anything else by mouth or lie down for the next 30 min.   aspirin 81 mg EC tablet 5/1/2024 Family Member, Other Yes Yes   Sig: Take 1 tablet (81 mg) by mouth once daily.   atenolol (Tenormin) 50 mg tablet Not Taking Family Member, Other No No   Sig: Take 1 tablet (50 mg) by mouth once daily.   Patient not taking: Reported on 5/2/2024   buPROPion XL (Wellbutrin XL) 150 mg 24 hr tablet 5/1/2024 Family Member, Other No Yes   Sig: Take 1 tablet (150 mg) by mouth 2 times a day.   ketoconazole  (NIZOral) 2 % shampoo Past Week Family Member, Other No Yes   Sig: Apply once to face prn rash   Patient taking differently: Apply 1 Application topically if needed for irritation.   levothyroxine (Synthroid, Levoxyl) 125 mcg tablet 5/1/2024 Family Member, Other No Yes   Sig: Take 1 tablet (125 mcg) by mouth once daily.   rosuvastatin (Crestor) 20 mg tablet 5/1/2024 Family Member, Other Yes Yes   Sig: Take 1 tablet (20 mg) by mouth once daily at bedtime.   traMADol (Ultram) 50 mg tablet Past Week Family Member, Other No Yes   Sig: Take 1 tablet (50 mg) by mouth 2 times a day as needed for moderate pain (4 - 6).   valsartan (Diovan) 160 mg tablet Not Taking Family Member, Other No No   Sig: Take 1 tablet (160 mg) by mouth once daily as needed (for elevated BP).   Patient not taking: Reported on 5/2/2024   valsartan (Diovan) 80 mg tablet Past Month Family Member, Other Yes Yes   Sig: Take 1 tablet (80 mg) by mouth once daily as needed (elevated BP).   zolpidem (Ambien) 5 mg tablet Not Taking  No No   Sig: Take 1 tablet (5 mg) by mouth as needed at bedtime for sleep.   Patient not taking: Reported on 5/2/2024      Facility-Administered Medications: None      Scheduled medications   Medication Dose Route Frequency    ARIPiprazole  5 mg oral Daily    aspirin  81 mg oral Daily    flecainide  50 mg oral q12h GWYN    lidocaine  1 patch transdermal Daily    metoprolol tartrate  25 mg oral BID    mirtazapine  7.5 mg oral Nightly    nitrofurantoin (macrocrystal-monohydrate)  100 mg oral q12h GWYN    pantoprazole  40 mg oral Daily before breakfast    Or    pantoprazole  40 mg intravenous Daily before breakfast    rosuvastatin  20 mg oral Nightly    valsartan  80 mg oral Daily     PRN medications   Medication    acetaminophen    acetaminophen    melatonin    metoprolol    ondansetron ODT    Or    ondansetron    polyethylene glycol    traZODone     Continuous Medications   Medication Dose Last Rate         Vitals:     /84  "(Patient Position: Standing)   Pulse 77   Temp 36.6 °C (97.9 °F)   Resp 18   Ht 1.676 m (5' 5.98\")   Wt 72.8 kg (160 lb 7.9 oz)   SpO2 93%   BMI 25.92 kg/m²     Patient Vitals for the past 24 hrs:   BP Temp Temp src Pulse Resp SpO2   05/06/24 1050 148/84 -- -- 77 -- --   05/06/24 1049 140/68 -- -- 67 -- --   05/06/24 1048 138/63 -- -- 63 -- --   05/06/24 0800 112/56 36.6 °C (97.9 °F) -- 59 18 93 %   05/06/24 0400 121/71 36.3 °C (97.3 °F) Temporal 58 16 97 %   05/05/24 2000 120/61 36.1 °C (97 °F) Temporal 61 16 97 %        Body mass index is 25.92 kg/m².     Vitals:    05/02/24 2320   Weight: 72.8 kg (160 lb 7.9 oz)          Intake/Output Summary (Last 24 hours) at 5/6/2024 1157  Last data filed at 5/6/2024 0345  Gross per 24 hour   Intake 220 ml   Output 400 ml   Net -180 ml           Physical Exam  Constitutional:       Appearance: Normal appearance.   HENT:      Head: Normocephalic.   Cardiovascular:      Rate and Rhythm: Normal rate.   Pulmonary:      Effort: Pulmonary effort is normal.   Abdominal:      General: Abdomen is flat.   Musculoskeletal:         General: Normal range of motion.      Cervical back: Normal range of motion.   Skin:     General: Skin is warm.   Neurological:      Mental Status: Mental status is at baseline.   Psychiatric:         Behavior: Behavior normal.          Labs:     Lab Results   Component Value Date    CKTOTAL 25 05/02/2024         Lab Results   Component Value Date    TROPHS 5 05/02/2024      Lab Results   Component Value Date    GLUCOSE 88 05/06/2024    CALCIUM 9.5 05/06/2024     05/06/2024    K 4.1 05/06/2024    CO2 28 05/06/2024     05/06/2024    BUN 34 (H) 05/06/2024    CREATININE 0.87 05/06/2024      Lab Results   Component Value Date    WBC 5.3 05/06/2024    HGB 11.2 (L) 05/06/2024    HCT 36.3 05/06/2024     (H) 05/06/2024     05/06/2024        LABS:  ABGs: No results found for: \"PH\"  PRO-BNP: No results found for: \"PROBNP\"   TSH:   Lab " "Results   Component Value Date    TSH 5.42 (H) 05/02/2024      Lipid Profile: No results found for: \"TRIG\", \"HDL\", \"LDLCALC\", \"CHOL\"   Hemoglobin A1C: No results found for: \"HGBA1C\"   Magnesium:    Lab Results   Component Value Date    MG 1.83 05/06/2024       Past Cardiology Tests (Last 3 Years):        Echo:1/2024 : 1. Left ventricular systolic function is normal with a 65% estimated ejection fraction.   2. Spectral Doppler shows an impaired relaxation pattern of left ventricular diastolic filling.   3. There is moderate concentric left ventricular hypertrophy.   4. Normal chamber sizes.   5. No significant valvular heart disease.   6. No significant change from previous study of June 2022.RVSP 29mm Hg.LA 3.5cm     E        ECG 12 Lead    Result Date: 5/6/2024   Suspect arm lead reversal, interpretation assumes no reversal Unusual P axis, possible ectopic atrial rhythm Right superior axis deviation Pulmonary disease pattern Abnormal ECG When compared with ECG of 03-MAY-2024 16:25, Ectopic atrial rhythm has replaced Sinus rhythm Vent. rate has decreased BY  83 BPM QRS axis Shifted left Criteria for Septal infarct are no longer Present    Vascular US lower extremity venous duplex bilateral    Result Date: 5/5/2024            [facility] [place] [address] [address] [id number]     Tel [phone] Fax [phone]  Vascular Lab Report  VASC US LOWER EXTREMITY VENOUS DUPLEX BILATERAL Patient Name:     LUCIANO SANDHUARD Reading           [id number] [address]                                        Physician:        MD Study Date:       5/4/2024             Ordering          [id number] FARZANA FLORES                                        Provider: MRN/PID:          [id number]             Fellow: Accession#:       [id number]         Technologist:     Martine Pittman RVT Date of           1945 / 78      Technologist 2: Birth/Age:        years Gender:           F                    Encounter#:       [id number] " Admission Status: Inpatient            Location          [facility]                                        Performed:  Diagnosis/ICD: Localized swelling, mass and lump, neck-R22.1 Indication:    Limb swelling CPT Codes:     48886 Peripheral venous duplex scan for DVT complete  CONCLUSIONS: Right Lower Venous: No evidence of acute deep vein thrombus visualized in the right lower extremity. Left Lower Venous: No evidence of acute deep vein thrombus visualized in the left lower extremity.  Imaging & Doppler Findings:  Right                 Compressible Thrombus        Flow Distal External Iliac     Yes        None   Spontaneous/Phasic CFV                       Yes        None   Spontaneous/Phasic PFV                       Yes        None FV Proximal               Yes        None   Spontaneous/Phasic FV Mid                    Yes        None FV Distal                 Yes        None Popliteal                 Yes        None   Spontaneous/Phasic Peroneal                  Yes        None PTV                       Yes        None  Left                  Compress Thrombus        Flow Distal External Iliac   Yes      None   Spontaneous/Phasic CFV                     Yes      None   Spontaneous/Phasic PFV                     Yes      None FV Proximal             Yes      None   Spontaneous/Phasic FV Mid                  Yes      None FV Distal               Yes      None Popliteal               Yes      None   Spontaneous/Phasic Peroneal                Yes      None PTV                     Yes      None  [id number] Kat[address]ka Henry MD Electronically signed by [id number] Katiuska Henry MD on 5/5/2024 at 10:20:48 PM  ** Final **     Electrocardiogram, 12-lead PRN ACS symptoms    Result Date: 5/4/2024  Supraventricular tachycardia Left axis deviation Late transition Abnormal ECG When compared with ECG of 02-MAY-2024 19:58, (unconfirmed) ID interval has decreased Vent. rate has increased BY  91 BPM Confirmed by Rubin Deleon  ([id number]) on 5/4/2024 3:18:11 PM    ECG 12 Lead    Result Date: 5/3/2024  Sinus rhythm with 1st degree AV block Left axis deviation Abnormal ECG When compared with ECG of 26-JUL-2013 10:06, HI interval has increased    XR chest 1 view    Result Date: 5/2/2024  STUDY: Chest Radiograph;  05/02/2024, 6:55 PM. INDICATION: Altered mental status. COMPARISON: CXR: 01/08/24, 06/24/22, 01/28/22. ACCESSION NUMBER(S): [id number] ORDERING CLINICIAN: WILLIAN MEAD TECHNIQUE:  Frontal chest was obtained at 18:55 hours. FINDINGS: CARDIOMEDIASTINAL SILHOUETTE: Cardiomediastinal silhouette is mildly enlarged but without significant change considering differences in technique and depth of inspiration lower on the current study.  Heart size likely upper limits of recorder device projecting over the left side of the heart.  LUNGS: Relatively low depth of inspiration without focal consolidation or pulmonary edema.  No pleural effusions or pneumothorax.  ABDOMEN: No remarkable upper abdominal findings.  BONES: No acute osseous changes.  Degenerative changes of the glenohumeral joints, and associated ossified loose bodies.  Mild thoracic scoliosis.    No acute pulmonary process. Signed by Shyam Cruz,     CT head wo IV contrast    Result Date: 5/2/2024  Interpreted By:  Edward Subramanian, STUDY: CT HEAD WO IV CONTRAST;  5/2/2024 7:02 pm   INDICATION: Signs/Symptoms:frequent falls generalized weakness.   COMPARISON: None.   ACCESSION NUMBER(S): [id number]   ORDERING CLINICIAN: ALEJANDRA CROSS   TECHNIQUE: Noncontrast axial CT images of head were obtained with coronal and sagittal reconstructed images.   FINDINGS: BRAIN PARENCHYMA:  No acute intraparenchymal hemorrhage or parenchymal evidence of acute large territory ischemic infarct. No mass-effect. Gray-white matter distinction is preserved.   VENTRICLES and EXTRA-AXIAL SPACES:  No acute extra-axial or intraventricular hemorrhage. No effacement of cerebral sulci. Ventricles and sulci  are age-concordant.   PARANASAL SINUSES/MASTOIDS:  No hemorrhage or air-fluid levels within the visualized paranasal sinuses. The mastoids are well aerated.   CALVARIUM/ORBITS:  No skull fracture.  The orbits and globes are intact to the extent visualized.   EXTRACRANIAL SOFT TISSUES: No discernible abnormality.       1. No acute intracranial abnormality.         MACRO: None.   Signed by: Edward Subramanian 5/2/2024 7:25 PM Dictation workstation:   [id number]    CT lumbar spine wo IV contrast    Result Date: 4/14/2024  * * *Final Report* * * DATE OF EXAM: Apr 14 2024  3:08PM   [facility]   [id number]  -  CT LUMBAR SPINE WO IVCON  / ACCESSION #  [id number] PROCEDURE REASON: Low back pain, trauma      * * * * Physician Interpretation * * * *  EXAMINATION:  CT THORACIC SPINE WO IVCON, CT LUMBAR SPINE WO IVCON CLINICAL HISTORY:  Fall, concern for spine fracture. TECHNIQUE: Spiral, high resolution axial unenhanced  images were obtained from the cervicothoracic junction to the sacrum with sagittal and coronal planar reconstructions. MQ:  CTTLWO_3 CT Radiation dose: Integrated Dose-Length Product (DLP) for this visit =   1503 mGy*cm. CT Dose Reduction Employed: Automated exposure control(AEC) and iterative recon COMPARISON: None. RESULT: Counting reference:  Cervicothoracic and lumbosacral junctions.  Assume first thoracic rib is the T1 level.  For the purposes of this report,   L4-5 is considered the level of the iliac crest and assume there are 5 lumbar-type vertebrae.  Anatomic variant:  None.  (topogram) images:  No additional findings. Alignment:    Preserved thoracic kyphosis and lumbar lordosis.  Mild right convex curvature centered at T5-T6.  Minimal left convex curvature centered at L1-L2 and right convex curvature centered at L3-L4.  Minimal retrolisthesis of L1 on L2, L2 and L3, and L3-L4. Bone marrow / fracture: Multiple thoracic compression fractures as detailed below (level, approximate degree of height loss): -T1,  10% -T3, 20% -T4, 20% -T6, 30% -T9, 20% -T10, 20% -T11, 40% with mild associated retropulsion and canal narrowing No fracture in the lumbar spine.  No aggressive osseous lesions. Canal and foramina:  Multilevel degenerative changes in the thoracolumbar spine most pronounced at L2-L3 where there is severe disc height loss and a posterior disc osteophyte complex which mildly narrows the central canal.  Multilevel facet arthropathy.  No high-grade canal or foraminal narrowing is seen. Paraspinal and thoracoabdominal soft tissues:   The paraspinal soft tissues planes are maintained.  Bilateral nonobstructing calculi measuring up to 0.4 cm on the right.  Diffuse atherosclerotic calcifications, including of the coronary arteries.  Normal caliber aorta.  Aortic valve leaflet calcifications. Pelvic bones:    The visualized sacrum and iliac wings are within normal limits.  The presacral soft tissues are normal in appearance.  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  Mild bilateral sacroiliac joint osteoarthritis.    IMPRESSION: Multiple age indeterminate thoracic compression fractures, as detailed. Anatomic Thoracic/Lumbar Variant: None.  L4-5 is considered the level of the iliac crest and assume there are 5 lumbar-type vertebrae. : [facility]   Transcribe Date/Time: Apr 14 2024  4:10P Dictated by : PARISH GUAMAN MD This examination was interpreted and the report reviewed and electronically signed by: PARISH GUAMAN MD on Apr 14 2024  4:23PM  EST    CT thoracic spine wo IV contrast    Result Date: 4/14/2024  * * *Final Report* * * DATE OF EXAM: Apr 14 2024  3:08PM   [facility]   0514  -  CT THORACIC SPINE WO IVCON  / ACCESSION #  [id number] PROCEDURE REASON: Spine fracture, thoracic, traumatic      * * * * Physician Interpretation * * * *  EXAMINATION:  CT THORACIC SPINE WO IVCON, CT LUMBAR SPINE WO IVCON CLINICAL HISTORY:  Fall, concern for spine fracture. TECHNIQUE: Spiral, high resolution  axial unenhanced  images were obtained from the cervicothoracic junction to the sacrum with sagittal and coronal planar reconstructions. MQ:  CTTLWO_3 CT Radiation dose: Integrated Dose-Length Product (DLP) for this visit =   1503 mGy*cm. CT Dose Reduction Employed: Automated exposure control(AEC) and iterative recon COMPARISON: None. RESULT: Counting reference:  Cervicothoracic and lumbosacral junctions.  Assume first thoracic rib is the T1 level.  For the purposes of this report,   L4-5 is considered the level of the iliac crest and assume there are 5 lumbar-type vertebrae.  Anatomic variant:  None.  (topogram) images:  No additional findings. Alignment:    Preserved thoracic kyphosis and lumbar lordosis.  Mild right convex curvature centered at T5-T6.  Minimal left convex curvature centered at L1-L2 and right convex curvature centered at L3-L4.  Minimal retrolisthesis of L1 on L2, L2 and L3, and L3-L4. Bone marrow / fracture: Multiple thoracic compression fractures as detailed below (level, approximate degree of height loss): -T1, 10% -T3, 20% -T4, 20% -T6, 30% -T9, 20% -T10, 20% -T11, 40% with mild associated retropulsion and canal narrowing No fracture in the lumbar spine.  No aggressive osseous lesions. Canal and foramina:  Multilevel degenerative changes in the thoracolumbar spine most pronounced at L2-L3 where there is severe disc height loss and a posterior disc osteophyte complex which mildly narrows the central canal.  Multilevel facet arthropathy.  No high-grade canal or foraminal narrowing is seen. Paraspinal and thoracoabdominal soft tissues:   The paraspinal soft tissues planes are maintained.  Bilateral nonobstructing calculi measuring up to 0.4 cm on the right.  Diffuse atherosclerotic calcifications, including of the coronary arteries.  Normal caliber aorta.  Aortic valve leaflet calcifications. Pelvic bones:    The visualized sacrum and iliac wings are within normal limits.  The presacral  soft tissues are normal in appearance.  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  Mild bilateral sacroiliac joint osteoarthritis.    IMPRESSION: Multiple age indeterminate thoracic compression fractures, as detailed. Anatomic Thoracic/Lumbar Variant: None.  L4-5 is considered the level of the iliac crest and assume there are 5 lumbar-type vertebrae. : [facility]   Transcribe Date/Time: Apr 14 2024  4:10P Dictated by : PARISH GUAMAN MD This examination was interpreted and the report reviewed and electronically signed by: PARISH GUAMAN MD on Apr 14 2024  4:23PM  EST    XR knee left 1-2 views    Result Date: 4/14/2024  * * *Final Report* * * DATE OF EXAM: Apr 14 2024  3:33PM   [facility]   [id number]  -  XR KNEE 2V AP/LAT LT  / ACCESSION #  [id number] PROCEDURE REASON: Trauma      * * * * Physician Interpretation * * * *  EXAM:XR KNEE 2V AP/LAT LT, XR KNEE 2V AP/LAT RT HISTORY: Trauma COMPARISON:None    IMPRESSION:There is no fracture or dislocation. There are tiny osteophytes at the tibial plateau and patella bilaterally. There is no joint effusion or focal soft tissue swelling. There is mild diffuse osteopenia. : [facility]    Transcribe Date/Time: Apr 14 2024  4:03P Dictated by : LYNDON MOSS MD This examination was interpreted and the report reviewed and electronically signed by: LYNDON MOSS MD on Apr 14 2024  4:04PM  EST    XR knee right 1-2 views    Result Date: 4/14/2024  * * *Final Report* * * DATE OF EXAM: Apr 14 2024  3:33PM   [facility]   [id number]  -  XR KNEE 2V AP/LAT RT  / ACCESSION #  [id number] PROCEDURE REASON: Trauma      * * * * Physician Interpretation * * * *  EXAM:XR KNEE 2V AP/LAT LT, XR KNEE 2V AP/LAT RT HISTORY: Trauma COMPARISON:None    IMPRESSION:There is no fracture or dislocation. There are tiny osteophytes at the tibial plateau and patella bilaterally. There is no joint effusion or focal soft tissue swelling. There is mild diffuse osteopenia.  : [facility]    Transcribe Date/Time: Apr 14 2024  4:03P Dictated by : LYNDON MOSS MD This examination was interpreted and the report reviewed and electronically signed by: LYNDON MOSS MD on Apr 14 2024  4:04PM  EST    XR chest 1 view    Result Date: 4/14/2024  * * *Final Report* * * DATE OF EXAM: Apr 14 2024  3:33PM   [facility]   [id number]  -  XR CHEST 1V FRONTAL PORT  / ACCESSION #  [id number] PROCEDURE REASON: Shortness of breath      * * * * Physician Interpretation * * * *  EXAMINATION:  CHEST RADIOGRAPH (PORTABLE SINGLE VIEW AP) Exam Date/Time:  4/14/2024 3:33 PM CLINICAL HISTORY: Shortness of breath MQ:  XCPR_5 Comparison:  None. RESULT: Lines, tubes, and devices:  There is a leadless pacemaker in the left lower chest. Lungs and pleura:  No evidence of infiltrate or edema.  The pleural margins appear normal. Cardiomediastinal silhouette:  Unremarkable cardiomediastinal silhouette. Other:  There are mild degenerative changes involving the shoulder joints with a loose body seen below the right shoulder joint.    IMPRESSION: Chronic changes as described above with no definite acute disease. : [facility]   Transcribe Date/Time: Apr 14 2024  3:45P Dictated by : TRICIA ELLIS MD This examination was interpreted and the report reviewed and electronically signed by: TRICIA ELLIS MD on Apr 14 2024  3:46PM  EST    CT cervical spine wo IV contrast    Result Date: 4/14/2024  * * *Final Report* * * DATE OF EXAM: Apr 14 2024  3:04PM   [facility]   0505  -  CT CERVICAL SPINE WO IVCON  / ACCESSION #  [id number] PROCEDURE REASON: Spine fracture, cervical, traumatic      * * * * Physician Interpretation * * * *  EXAMINATION:  CT BRAIN WO IVCON, CT CERVICAL SPINE WO IVCON CLINICAL HISTORY:  Status post fall TECHNIQUE:  Serial axial unenhanced images were obtained from the  vertex to the foramen magnum.  Spiral, high resolution axial unenhanced images were obtained from the skull base to the cervicothoracic junction  with sagittal and coronal planar reconstructions. MQ:  CTBCSWO_3 Dose-Length Product (DLP): 1176 mGy*cm. CT Dose Reduction Employed: Automated exposure control(AEC) and iterative recon COMPARISON:  None. RESULT: BRAIN: Acute change:   No evidence of an acute contusion or other acute parenchymal process. Hemorrhage:    No evidence of acute intracranial hemorrhage. Mass lesion / Mass effect:   There is no evidence of an intracranial mass or extraaxial fluid collection.  No significant mass effect. Chronic change:   Scattered patchy foci of low attenuation are present within supratentorial white matter which is a nonspecific finding but likely represents mild microvascular ischemia. Parenchyma:  There is moderate generalized volume loss. Ventricles:   Ventricular enlargement concordant with the degree of parenchymal volume loss. Paranasal sinuses and skull base:  The visualized paranasal sinuses are grossly clear.  The skull base and visualized extracranial soft tissues are grossly normal. CERVICAL: Counting reference:  Craniocervical junction.   Anatomic Variants:  None. Alignment:    Alignment is anatomic. Craniocervical junction:    Craniocervical junction is normal. Osseous structures/fracture:    No evidence of a lytic or blastic process in the visualized spine.  No evidence of acute or chronic fracture. Cervical soft tissues:    The paraspinal soft tissues planes are maintained. Degenerative changes: There is moderate to severe anterior osteophytosis, intervertebral disc space loss, endplate sclerosis and uncovertebral hypertrophy of the lower cervical spine, worse at C5-6 and C6-7. Others: Images of the lung apices demonstrate patchy biapical fibrosis.    IMPRESSION: NO CT EVIDENCE OF ACUTE INTRACRANIAL PROCESS. NO CT EVIDENCE OF AN ACUTE CERVICAL SPINE FRACTURE. Anatomic Variant:  None.  Assume 7 cervical vertebrae with counting from the craniocervical junction. : DAGOBERTO   Transcribe Date/Time:  Apr 14 2024  3:27P Dictated by : KRISTIN NARVAEZ MD This examination was interpreted and the report reviewed and electronically signed by: KRISTIN NARVAEZ MD on Apr 14 2024  3:33PM  EST    CT head wo IV contrast    Result Date: 4/14/2024  * * *Final Report* * * DATE OF EXAM: Apr 14 2024  3:04PM   [facility]   [id number]  -  CT BRAIN WO IVCON   / ACCESSION #  [id number] PROCEDURE REASON: Head trauma, moderate-severe      * * * * Physician Interpretation * * * *  EXAMINATION:  CT BRAIN WO IVCON, CT CERVICAL SPINE WO IVCON CLINICAL HISTORY:  Status post fall TECHNIQUE:  Serial axial unenhanced images were obtained from the  vertex to the foramen magnum.  Spiral, high resolution axial unenhanced images were obtained from the skull base to the cervicothoracic junction with sagittal and coronal planar reconstructions. MQ:  [id number]_3 Dose-Length Product (DLP): 1176 mGy*cm. CT Dose Reduction Employed: Automated exposure control(AEC) and iterative recon COMPARISON:  None.  RESULT: BRAIN: Acute change:   No evidence of an acute contusion or other acute parenchymal process. Hemorrhage:    No evidence of acute intracranial hemorrhage. Mass lesion / Mass effect:   There is no evidence of an intracranial mass or extraaxial fluid collection.  No significant mass effect. Chronic change:   Scattered patchy foci of low attenuation are present within supratentorial white matter which is a nonspecific finding but likely represents mild microvascular ischemia. Parenchyma:  There is moderate generalized volume loss. Ventricles:   Ventricular enlargement concordant with the degree of parenchymal volume loss. Paranasal sinuses and skull base:  The visualized paranasal sinuses are grossly clear.  The skull base and visualized extracranial soft tissues are grossly normal. CERVICAL: Counting reference:  Craniocervical junction.   Anatomic Variants:  None. Alignment:    Alignment is anatomic. Craniocervical junction:    Craniocervical junction is normal.  Osseous structures/fracture:    No evidence of a lytic or blastic process in the visualized spine.  No evidence of acute or chronic fracture. Cervical soft tissues:    The paraspinal soft tissues planes are maintained.  Degenerative changes: There is moderate to severe anterior osteophytosis, intervertebral disc space loss, endplate sclerosis and uncovertebral hypertrophy of the lower cervical spine, worse at C5-6 and C6-7. Others: Images of the lung apices demonstrate patchy biapical fibrosis.    IMPRESSION: NO CT EVIDENCE OF ACUTE INTRACRANIAL PROCESS. NO CT EVIDENCE OF AN ACUTE CERVICAL SPINE FRACTURE. Anatomic Variant:  None.  Assume 7 cervical vertebrae with counting from the craniocervical junction. : PSCALVIN   Transcribe Date/Time: Apr 14 2024  3:27P Dictated by : KRISTIN NARVAEZ MD This examination was interpreted and the report reviewed and electronically signed by: KRISTIN NARVAEZ MD on Apr 14 2024  3:33PM  EST          Assessment/Plan:   1.Hx of SVT/? Possible atrial fib(not certain); in view of telemetry; no SVT overnight     -continue metoprolol 25 mg bid and flecainide 50 mg bid   Follow up Dr Martins -primary cardiologist as outpt     2,HTN; bp 120-140/60-80,s mmhg. On Losartan 80 mg every day, metoprolol   Ok to discharge anytime from cardiac view.

## 2024-05-06 NOTE — PROGRESS NOTES
"Theodora Goel is a 78 y.o. female on day 0 of admission presenting with Weakness.    SUBJECTIVE:    Pts orientation is waxing and waning   Initially when rounding on patient, she refused to allow this writer to enter room shortly after moving medical units.  When approached, she said \"bye\" and waved; agitated.  When asked if this writer could enter the room, she stated no, no one can come in here.    This writer rounded again after family had come to visit.  Pt apologized to this writer and stated she is unsure what is going on. \"I thought I was at home and people were trying to come in my house, but I know I am in the hospital.\"    Pt has periods of increases paranoia and states at times visual hallucinations, though she is aware they are not real.    Collateral: Son Yoav and daughter in law, states patient has been experiencing intermittent paranoia and hallucinations for appx 1 month.   Son inquired if tramadol could cause symptoms, reporting he believes there may be a correlation of onset of symptoms.     Pt continues to endorse poor sleep and depression; denies SI HI VH    Exam:  Vital Signs: /61 (BP Location: Right arm, Patient Position: Lying)   Pulse 61   Temp 36.1 °C (97 °F) (Temporal)   Resp 16   Ht 1.676 m (5' 5.98\")   Wt 72.8 kg (160 lb 7.9 oz)   SpO2 97%   BMI 25.92 kg/m²   Musculoskeletal:  normal  Gait/Station:  in bed      Mental Status Exam:  General Appearance: { Psych Appearance:[id number]  Speech: normal pitch and normal volume  Mood: sad  Affect: appropriate  Thought Process: Linear, goal directed  Thought Associations: No loosening of associations  Thought Content: normal and intermittently paranoid with hallucinations  Perception:  intermittent hallucinations   Level of Consciousness: Alert  Orientation: Alert and oriented to person, place, time and situation  Attention and Concentration: Mild impairment in attention  Cognition: waxing and waning   Insight: " good  Judgment: good     Results for orders placed or performed during the hospital encounter of 05/02/24 (from the past 96 hour(s))   Comprehensive metabolic panel   Result Value Ref Range    Glucose 89 74 - 99 mg/dL    Sodium 142 136 - 145 mmol/L    Potassium 3.6 3.5 - 5.3 mmol/L    Chloride 108 (H) 98 - 107 mmol/L    Bicarbonate 27 21 - 32 mmol/L    Anion Gap 11 10 - 20 mmol/L    Urea Nitrogen 26 (H) 6 - 23 mg/dL    Creatinine 0.62 0.50 - 1.05 mg/dL    eGFR >90 >60 mL/min/1.73m*2    Calcium 9.3 8.6 - 10.3 mg/dL    Albumin 3.9 3.4 - 5.0 g/dL    Alkaline Phosphatase 67 33 - 136 U/L    Total Protein 6.6 6.4 - 8.2 g/dL    AST 27 9 - 39 U/L    Bilirubin, Total 0.3 0.0 - 1.2 mg/dL    ALT 21 7 - 45 U/L   TSH with reflex to Free T4 if abnormal   Result Value Ref Range    Thyroid Stimulating Hormone 5.42 (H) 0.44 - 3.98 mIU/L   Folate   Result Value Ref Range    Folate, Serum 13.8 >5.0 ng/mL   Ammonia   Result Value Ref Range    Ammonia 11 (L) 16 - 53 umol/L   Troponin I, High Sensitivity   Result Value Ref Range    Troponin I, High Sensitivity 5 0 - 13 ng/L   CBC and Auto Differential   Result Value Ref Range    WBC 3.9 (L) 4.4 - 11.3 x10*3/uL    nRBC 0.0 0.0 - 0.0 /100 WBCs    RBC 3.65 (L) 4.00 - 5.20 x10*6/uL    Hemoglobin 11.8 (L) 12.0 - 16.0 g/dL    Hematocrit 37.0 36.0 - 46.0 %     (H) 80 - 100 fL    MCH 32.3 26.0 - 34.0 pg    MCHC 31.9 (L) 32.0 - 36.0 g/dL    RDW 13.0 11.5 - 14.5 %    Platelets 201 150 - 450 x10*3/uL    Neutrophils % 50.1 40.0 - 80.0 %    Immature Granulocytes %, Automated 0.3 0.0 - 0.9 %    Lymphocytes % 36.7 13.0 - 44.0 %    Monocytes % 10.0 2.0 - 10.0 %    Eosinophils % 2.6 0.0 - 6.0 %    Basophils % 0.3 0.0 - 2.0 %    Neutrophils Absolute 1.96 1.60 - 5.50 x10*3/uL    Immature Granulocytes Absolute, Automated 0.01 0.00 - 0.50 x10*3/uL    Lymphocytes Absolute 1.43 0.80 - 3.00 x10*3/uL    Monocytes Absolute 0.39 0.05 - 0.80 x10*3/uL    Eosinophils Absolute 0.10 0.00 - 0.40 x10*3/uL     Basophils Absolute 0.01 0.00 - 0.10 x10*3/uL   Creatine Kinase   Result Value Ref Range    Creatine Kinase 25 0 - 215 U/L   Protime-INR   Result Value Ref Range    Protime 10.8 9.8 - 12.8 seconds    INR 1.0 0.9 - 1.1   Thyroxine, Free   Result Value Ref Range    Thyroxine, Free 0.76 0.61 - 1.12 ng/dL   ECG 12 Lead   Result Value Ref Range    Ventricular Rate 63 BPM    Atrial Rate 63 BPM    UT Interval 216 ms    QRS Duration 94 ms    QT Interval 432 ms    QTC Calculation(Bazett) 442 ms    P Axis 51 degrees    R Axis -32 degrees    T Axis 22 degrees    QRS Count 10 beats    Q Onset 214 ms    P Onset 106 ms    P Offset 173 ms    T Offset 430 ms    QTC Fredericia 439 ms   Sars-CoV-2 PCR   Result Value Ref Range    Coronavirus 2019, PCR Not Detected Not Detected   Influenza A, and B PCR   Result Value Ref Range    Flu A Result Not Detected Not Detected    Flu B Result Not Detected Not Detected   CBC   Result Value Ref Range    WBC 3.5 (L) 4.4 - 11.3 x10*3/uL    nRBC 0.0 0.0 - 0.0 /100 WBCs    RBC 3.11 (L) 4.00 - 5.20 x10*6/uL    Hemoglobin 10.0 (L) 12.0 - 16.0 g/dL    Hematocrit 32.0 (L) 36.0 - 46.0 %     (H) 80 - 100 fL    MCH 32.2 26.0 - 34.0 pg    MCHC 31.3 (L) 32.0 - 36.0 g/dL    RDW 12.9 11.5 - 14.5 %    Platelets 175 150 - 450 x10*3/uL   Comprehensive metabolic panel   Result Value Ref Range    Glucose 104 (H) 74 - 99 mg/dL    Sodium 143 136 - 145 mmol/L    Potassium 3.2 (L) 3.5 - 5.3 mmol/L    Chloride 109 (H) 98 - 107 mmol/L    Bicarbonate 26 21 - 32 mmol/L    Anion Gap 11 10 - 20 mmol/L    Urea Nitrogen 19 6 - 23 mg/dL    Creatinine 0.50 0.50 - 1.05 mg/dL    eGFR >90 >60 mL/min/1.73m*2    Calcium 8.9 8.6 - 10.3 mg/dL    Albumin 3.3 (L) 3.4 - 5.0 g/dL    Alkaline Phosphatase 54 33 - 136 U/L    Total Protein 5.6 (L) 6.4 - 8.2 g/dL    AST 21 9 - 39 U/L    Bilirubin, Total 0.4 0.0 - 1.2 mg/dL    ALT 17 7 - 45 U/L   Urinalysis with Reflex Culture and Microscopic   Result Value Ref Range    Color, Urine  Light-Yellow Light-Yellow, Yellow, Dark-Yellow    Appearance, Urine Turbid (N) Clear    Specific Gravity, Urine 1.018 1.005 - 1.035    pH, Urine 5.5 5.0, 5.5, 6.0, 6.5, 7.0, 7.5, 8.0    Protein, Urine NEGATIVE NEGATIVE, 10 (TRACE), 20 (TRACE) mg/dL    Glucose, Urine Normal Normal mg/dL    Blood, Urine NEGATIVE NEGATIVE    Ketones, Urine NEGATIVE NEGATIVE mg/dL    Bilirubin, Urine NEGATIVE NEGATIVE    Urobilinogen, Urine Normal Normal mg/dL    Nitrite, Urine NEGATIVE NEGATIVE    Leukocyte Esterase, Urine 250 Irena/µL (A) NEGATIVE   Extra Urine Gray Tube   Result Value Ref Range    Extra Tube Hold for add-ons.    Microscopic Only, Urine   Result Value Ref Range    WBC, Urine 11-20 (A) 1-5, NONE /HPF    RBC, Urine 1-2 NONE, 1-2, 3-5 /HPF   Urine Culture    Specimen: Straight Catheter; Urine   Result Value Ref Range    Urine Culture >100,000 Escherichia coli (A)        Susceptibility    Escherichia coli - MICROSCAN     Ampicillin  Susceptible      Cefazolin  Susceptible      Cefazolin (uncomplicated UTIs only)  Susceptible      Ciprofloxacin  Susceptible      Gentamicin  Susceptible      Nitrofurantoin  Susceptible      Piperacillin/Tazobactam  Susceptible      Trimethoprim/Sulfamethoxazole  Susceptible    Electrocardiogram, 12-lead PRN ACS symptoms   Result Value Ref Range    Ventricular Rate 154 BPM    Atrial Rate 80 BPM    QRS Duration 78 ms    QT Interval 318 ms    QTC Calculation(Bazett) 509 ms    R Axis -36 degrees    T Axis 72 degrees    QRS Count 25 beats    Q Onset 218 ms    T Offset 377 ms    QTC Fredericia 435 ms   CBC   Result Value Ref Range    WBC 4.2 (L) 4.4 - 11.3 x10*3/uL    nRBC 0.0 0.0 - 0.0 /100 WBCs    RBC 3.21 (L) 4.00 - 5.20 x10*6/uL    Hemoglobin 10.3 (L) 12.0 - 16.0 g/dL    Hematocrit 32.8 (L) 36.0 - 46.0 %     (H) 80 - 100 fL    MCH 32.1 26.0 - 34.0 pg    MCHC 31.4 (L) 32.0 - 36.0 g/dL    RDW 12.9 11.5 - 14.5 %    Platelets 184 150 - 450 x10*3/uL   Basic Metabolic Panel   Result Value Ref  Range    Glucose 93 74 - 99 mg/dL    Sodium 141 136 - 145 mmol/L    Potassium 3.4 (L) 3.5 - 5.3 mmol/L    Chloride 109 (H) 98 - 107 mmol/L    Bicarbonate 26 21 - 32 mmol/L    Anion Gap 9 (L) 10 - 20 mmol/L    Urea Nitrogen 19 6 - 23 mg/dL    Creatinine 0.63 0.50 - 1.05 mg/dL    eGFR >90 >60 mL/min/1.73m*2    Calcium 9.1 8.6 - 10.3 mg/dL   Magnesium   Result Value Ref Range    Magnesium 1.59 (L) 1.60 - 2.40 mg/dL   Phosphorus   Result Value Ref Range    Phosphorus 4.4 2.5 - 4.9 mg/dL   CBC   Result Value Ref Range    WBC 5.4 4.4 - 11.3 x10*3/uL    nRBC 0.0 0.0 - 0.0 /100 WBCs    RBC 3.44 (L) 4.00 - 5.20 x10*6/uL    Hemoglobin 10.9 (L) 12.0 - 16.0 g/dL    Hematocrit 35.0 (L) 36.0 - 46.0 %     (H) 80 - 100 fL    MCH 31.7 26.0 - 34.0 pg    MCHC 31.1 (L) 32.0 - 36.0 g/dL    RDW 13.0 11.5 - 14.5 %    Platelets 197 150 - 450 x10*3/uL   Basic Metabolic Panel   Result Value Ref Range    Glucose 96 74 - 99 mg/dL    Sodium 141 136 - 145 mmol/L    Potassium 3.8 3.5 - 5.3 mmol/L    Chloride 107 98 - 107 mmol/L    Bicarbonate 27 21 - 32 mmol/L    Anion Gap 11 10 - 20 mmol/L    Urea Nitrogen 26 (H) 6 - 23 mg/dL    Creatinine 0.64 0.50 - 1.05 mg/dL    eGFR >90 >60 mL/min/1.73m*2    Calcium 9.2 8.6 - 10.3 mg/dL   Magnesium   Result Value Ref Range    Magnesium 1.76 1.60 - 2.40 mg/dL   Phosphorus   Result Value Ref Range    Phosphorus 4.4 2.5 - 4.9 mg/dL   Vitamin B12   Result Value Ref Range    Vitamin B12 194 (L) 211 - 911 pg/mL   Folate   Result Value Ref Range    Folate, Serum 6.0 >5.0 ng/mL   Vitamin D 25-Hydroxy,Total (for eval of Vitamin D levels)   Result Value Ref Range    Vitamin D, 25-Hydroxy, Total 26 (L) 30 - 100 ng/mL         Risk Assessment:  Suicide low    Impression:   Delirium    R/O medication induced psychosis possibly due to tramadol   MDD moderate       Recommendations:    Continue Remeron 7.5mg at bedtime  Continue Melatonin 6mg at bedtime PRN for insomnia- if ineffective by midnight utilize trazodone  25mg at bedtime PRN  Consider alternative pain management aside from Tramadol to see if symptoms subside  Start Abilify 5mg QAM  Psychiatry will continue to follow through admission    Thank you for allowing us to participate in the care of this patient.       ILDA Castorena-CNP  5/5/2024  10:08 PM

## 2024-05-06 NOTE — PROGRESS NOTES
Spiritual Care Visit    Clinical Encounter Type  Visited With: Patient  Routine Visit: Introduction  Continue Visiting: No                                            Taxonomy  Intended Effects: Preserve dignity and respect, Helping someone feel comforted, Promote sense of peace  Methods: Offer support  Interventions: Share words of hope and inspiration    Patient shared she has no Hindu preference and does not want any  visits.   acknowledged her statement and wished her a good day.

## 2024-05-06 NOTE — NURSING NOTE
"2230 - Pt. Appears confused and is attempting to get up to the BR.  Pt. Refusing to wear non-slip socks at this time stating she has \"other shoes over there\".  No other footwear present.   Pt. Able to stand and pivot to the commode with heavy assist.  Pt. Incontinent of stool.   Linda care performed and pt. Returned to bed with 2assist.   Bed alarm on.  Call light within reach.    0345 - Pt. Observed awake and dangling at bedside.  Bed alarm is on.  Pt. Expresses wanting to get up.  Noted to be incontinent of stool.   2nd staff member present to assist with transfer to [facility] with walker.  Pt. Moving easier than previous in the shift and expressed pain is improved after Tyelonol.  Pt. Is also disoriented to place and situation and time.   Continent of urine.  Following voiding, Pericare performed and pt. Returned to bed.  Pt. Is cooperative with care and following directions appropriately.    EOS note:  Pt. Alert to self this shift.  Disoriented to place and situation most notably.  Pt. Does not call appropriately and put on zone 2 bed alarm.  Pt. Was cooperative with care however, would not permit nurse to attempt IV start.  No IV access at this time.   VSS.   Sleep aide given at HS.   Pt. Observed to be resting intermittently through the night, waking and then browsing her phone.  Appears to be more comfortable later in the shift and she is ambulating better with 2assist and walker.   Denies needs at this time.    "

## 2024-05-06 NOTE — NURSING NOTE
Pt had episodes of delirium overnight. Psych remains on consult, tramadol stopped to r/o medication induced psychosis. Continues to receive treatment for UTI. Spoke with pts son Yoav this morning, they have decided to pursue SNF at [facility]. Yoav would like outpatient palliative care to follow up at discharge, referral sent to [facility].     [facility] palliative care to follow up outpatient.  SNF referral to [facility]  No further palliative needs, will sign off at this time.

## 2024-05-06 NOTE — PROGRESS NOTES
"Theodora Goel is a 78 y.o. female on day 1 of admission presenting with Weakness.    Subjective   Patient seen and examined  Lying in bed, NAD  Has no complaints this am      Objective     Physical Exam    Constitutional: Well developed, no distress, alert and cooperative  Skin: Warm and dry, scattered RLE bruising   Eyes: EOMI, clear sclera  ENMT: mucous membranes moist  Respiratory: Patent airways, CTAB  Cardiovascular: Regular, rate and rhythm  Abdominal: Nondistended, soft, non-tender, +BS  MSK: ROM intact, mild LLE swelling  Neuro: alert and oriented x 2-3    Last Recorded Vitals  Blood pressure 148/84, pulse 77, temperature 36.6 °C (97.9 °F), resp. rate 18, height 1.676 m (5' 5.98\"), weight 72.8 kg (160 lb 7.9 oz), SpO2 93%.  Intake/Output last 3 Shifts:  I/O last 3 completed shifts:  In: 220 (3 mL/kg) [P.O.:220]  Out: 1050 (14.4 mL/kg) [Urine:1050 (0.4 mL/kg/hr)]  Weight: 72.8 kg     Relevant Results  Scheduled medications  ARIPiprazole, 5 mg, oral, Daily  aspirin, 81 mg, oral, Daily  flecainide, 50 mg, oral, q12h GWYN  lidocaine, 1 patch, transdermal, Daily  metoprolol tartrate, 25 mg, oral, BID  mirtazapine, 7.5 mg, oral, Nightly  nitrofurantoin (macrocrystal-monohydrate), 100 mg, oral, q12h GWYN  pantoprazole, 40 mg, oral, Daily before breakfast   Or  pantoprazole, 40 mg, intravenous, Daily before breakfast  rosuvastatin, 20 mg, oral, Nightly  valsartan, 80 mg, oral, Daily      Continuous medications     PRN medications  PRN medications: acetaminophen **OR** [DISCONTINUED] acetaminophen **OR** [DISCONTINUED] acetaminophen, acetaminophen **OR** [DISCONTINUED] acetaminophen **OR** [DISCONTINUED] acetaminophen, melatonin, metoprolol, ondansetron ODT **OR** ondansetron, polyethylene glycol, traZODone  Results from last 7 days   Lab Units 05/06/24  0529 05/05/24  0527 05/04/24  0518   WBC AUTO x10*3/uL 5.3 5.4 4.2*   RBC AUTO x10*6/uL 3.52* 3.44* 3.21*   HEMOGLOBIN g/dL 11.2* 10.9* 10.3*     Results from " last 7 days   Lab Units 05/06/24  0529 05/05/24  0527 05/04/24  0518 05/03/24  0624 05/02/24  1825   SODIUM mmol/L 142 141 141 143 142   POTASSIUM mmol/L 4.1 3.8 3.4* 3.2* 3.6   CHLORIDE mmol/L 106 107 109* 109* 108*   CO2 mmol/L 28 27 26 26 27   BUN mg/dL 34* 26* 19 19 26*   CREATININE mg/dL 0.87 0.64 0.63 0.50 0.62   CALCIUM mg/dL 9.5 9.2 9.1 8.9 9.3   PHOSPHORUS mg/dL 5.7* 4.4 4.4  --   --    MAGNESIUM mg/dL 1.83 1.76 1.59*  --   --    BILIRUBIN TOTAL mg/dL  --   --   --  0.4 0.3   ALT U/L  --   --   --  17 21   AST U/L  --   --   --  21 27            Assessment/Plan   Principal Problem:    Weakness  Active Problems:    Anxiety    Recurrent major depressive disorder (CMS-HCC)    Urinary incontinence    Generalized weakness    Plan:    -UA + -f/u Ucx -pansensitive E. coli-placed on Macrobid   -psych consulted -cont Remeron, start Abilify this am and melatonin PRN for insomnia  -palliative consulted-per note son wanted outpatient palliative to follow at discharge   -cont holding home tramadol   -continue remeron and melatonin   -EP consulted-cont- flecainide 50mg BID and metop 25mg BID. She will follow-up with Dr. Wilmer Liu at the [place] office, likely be scheduled for loop recorder removal or replacement.    -lidocaine patch for L knee -PRN tylenol and home ultram   -continue home meds   -replete electrolytes PRN  -cardiac diet   -DVT ppx: SCDs  -POC discussed with attending at bedside  -Dispo: [place] [address], [facility]  [address] [address] [place] [address]  Phone: [phone] Fax: [phone]

## 2024-05-06 NOTE — PROGRESS NOTES
Physical Therapy    Physical Therapy Treatment    Patient Name: Theodora Goel  MRN: [id number]  Today's Date: 5/6/2024  Time Calculation  Start Time: 1325  Stop Time: 1355  Time Calculation (min): 30 min        05/06/24 1325   PT  Visit   PT Received On 05/06/24   Response to Previous Treatment Patient with no complaints from previous session.   General   Prior to Session Communication Bedside nurse   Patient Position Received Bed, 3 rail up;Alarm on   Preferred Learning Style verbal;visual   Precautions   Medical Precautions Fall precautions   Pain Assessment   Pain Assessment 0-10   Pain Score 0 - No pain   Cognition   Overall Cognitive Status WFL   Orientation Level Oriented X4   Therapeutic Exercise   Therapeutic Exercise Performed Yes   Therapeutic Exercise Activity 1 ankle pumps x15   Therapeutic Exercise Activity 2 resisted foot press x10   Therapeutic Exercise Activity 3 heel slides x15   Therapeutic Exercise Activity 4 hip ABD x10   Bed Mobility   Bed Mobility Yes   Bed Mobility 1   Bed Mobility 1 Supine to sitting   Level of Assistance 1 Moderate assistance   Bed Mobility Comments 1 draw sheet   Ambulation/Gait Training   Ambulation/Gait Training Performed Yes   Ambulation/Gait Training 1   Surface 1 Level tile   Device 1 Rolling walker   Gait Support Devices Gait belt   Assistance 1 Minimum assistance   Quality of Gait 1 Diminished heel strike;Inconsistent stride length;Decreased step length;Forward flexed posture   Comments/Distance (ft) 1 x8' decreased WB LLE   Transfers   Transfer Yes   Transfer 1   Transfer From 1 Bed to   Transfer to 1 Stand   Technique 1 Sit to stand   Transfer Device 1 Walker;Gait belt   Transfer Level of Assistance 1 Moderate assistance   Transfers 2   Transfer From 2 Stand to   Transfer to 2 Chair with arms   Technique 2 Stand to sit   Transfer Device 2 Walker   Transfer Level of Assistance 2 Minimum assistance   Trials/Comments 2 hand placement and cues for technique    Activity Tolerance   Endurance Tolerates 30 min exercise with multiple rests   Early Mobility/Exercise Safety Screen Proceed with mobilization - No exclusion criteria met   PT Assessment   PT Assessment Results Decreased strength;Decreased endurance;Impaired balance;Decreased mobility   Rehab Prognosis Good   End of Session Communication Bedside nurse   Assessment Comment eager to participate.  weaker LLE though performed all activity well with less assist.   End of Session Patient Position Up in chair;Alarm on   Outpatient Education   Individual(s) Educated Patient   Education Provided Fall Risk;Body Mechanics;POC;Posture   Risk and Benefits Discussed with Patient/Caregiver/Other yes   Patient/Caregiver Demonstrated Understanding yes   Plan of Care Discussed and Agreed Upon yes   Patient Response to Education Patient/Caregiver Verbalized Understanding of Information   PT Plan   Inpatient/Swing Bed or Outpatient Inpatient   PT Plan   Treatment/Interventions Bed mobility;Transfer training;Gait training;Therapeutic exercise   PT Plan Skilled PT   PT Frequency 3 times per week   PT Discharge Recommendations Moderate intensity level of continued care   Equipment Recommended upon Discharge Wheeled walker   PT Recommended Transfer Status Assist x1;Contact guard     Outcome Measures:  [facility] Basic Mobility  Turning from your back to your side while in a flat bed without using bedrails: A little  Moving from lying on your back to sitting on the side of a flat bed without using bedrails: A little  Moving to and from bed to chair (including a wheelchair): A little  Standing up from a chair using your arms (e.g. wheelchair or bedside chair): A lot  To walk in hospital room: A little  Climbing 3-5 steps with railing: Total  Basic Mobility - Total Score: 15              EDUCATION:  Outpatient Education  Individual(s) Educated: Patient  Education Provided: Fall Risk, Body Mechanics, POC, Posture  Risk and Benefits Discussed with  Patient/Caregiver/Other: yes  Patient/Caregiver Demonstrated Understanding: yes  Plan of Care Discussed and Agreed Upon: yes  Patient Response to Education: Patient/Caregiver Verbalized Understanding of Information  Education Documentation  Body Mechanics, taught by Sotero Washington PTA at 5/6/2024  2:17 PM.  Learner: Patient  Readiness: Acceptance  Method: Explanation  Response: Verbalizes Understanding, Needs Reinforcement    Home Exercise Program, taught by Sotero Washington PTA at 5/6/2024  2:17 PM.  Learner: Patient  Readiness: Acceptance  Method: Explanation  Response: Verbalizes Understanding, Needs Reinforcement    Mobility Training, taught by Sotero Washington PTA at 5/6/2024  2:17 PM.  Learner: Patient  Readiness: Acceptance  Method: Explanation  Response: Verbalizes Understanding, Needs Reinforcement    GOALS:  Encounter Problems       Encounter Problems (Active)       PT Problem       Pt will demonstrate ability to perform all bed mobility tasks with SBA.  (Progressing)       Start:  05/03/24    Expected End:  05/13/24            Pt will be able to perform sit to stand transfer with CGA and min verbal and/or tactile cueing for increased functional independence.   (Progressing)       Start:  05/03/24    Expected End:  05/13/24            Pt will be able to ambulate 50 ft with LRAD and min A for increased functional mobility.   (Progressing)       Start:  05/03/24    Expected End:  05/13/24            Pt will demonstrate fair plus static and dynamic standing balance with CGA for increased ability to complete functional tasks and ADLs.  (Progressing)       Start:  05/03/24    Expected End:  05/13/24               Pain - Adult          Safety       LTG - Patient will adhere to hip precautions during ADL's and transfers       Start:  05/02/24    Expected End:  05/13/24            LTG - Patient will demonstrate safety requirements appropriate to situation/environment       Start:  05/02/24    Expected End:  05/13/24             LTG - Patient will utilize safety techniques       Start:  05/02/24    Expected End:  05/13/24            STG - Patient locks brakes on wheelchair       Start:  05/02/24    Expected End:  05/13/24            STG - Patient uses call light consistently to request assistance with transfers       Start:  05/02/24    Expected End:  05/13/24            STG - Patient uses gait belt during all transfers       Start:  05/02/24    Expected End:  05/13/24            Goal 1       Start:  05/02/24    Expected End:  05/13/24            Goal 2       Start:  05/02/24    Expected End:  05/13/24            Goal 3       Start:  05/02/24    Expected End:  05/13/24

## 2024-05-06 NOTE — CONSULTS
CARDIOLOGY/ELECTROPHYSIOLOGY CONSULTATION    PATIENT NAME: Theodora Goel  PATIENT MRN: [id number]  SERVICE DATE:  5/6/2024  SERVICE TIME: 11:25 AM    CONSULTANT: Rubin Deleon MD  PRIMARY CARE PHYSICIAN: Rose Kim MD  ATTENDING PHYSICIAN: Shraddha Gupta DO      IMPRESSIONS:  1.  Chronic hypertension, with only fair control on beta-blocker and angiotensin receptor blocker therapy.  2.  E. coli urinary tract infection, on antibiotic therapy.  3.  Paroxysmal supraventricular tachycardia, likely representing intra-atrial tachycardia, based upon the EKG from 5/3/2024.  This is likely the same arrhythmia she has manifested in the past, and for which she was started on beta-blockers.  Dr. Garnica has added low-dose flecainide, which is a reasonable option, given the patient's negative Lexiscan study in 2022.  Because she has at least moderate left ventricular hypertrophy by echocardiogram, sotalol and dofetilide are relatively unattractive.  Low-dose amiodarone would certainly be a possibility, however.  4.  Status post Medtronic insertable loop recorder placement in January 2020, likely with complete battery depletion, as the device is now 52 months old.  5.  Prerenal azotemia likely due to poor oral intake; additional fluid resuscitation is recommended.  6.  Anxiety and depression, being addressed by psychiatry.  7.  Macrocytosis (), suspicious for occult excessive alcohol intake.  8.  Known coronary calcification without flow-limiting coronary disease by prior evaluation.    RECOMMENDATIONS:  1.  The patient may continue on low-dose beta-blockers and on flecainide at 50 mg p.o. twice daily for SVT suppression.  2.  She will follow-up with Dr. Wilmer Liu at the [place] office, likely be scheduled for loop recorder removal or replacement.  3.  With frequent breakthroughs of atrial tachycardia in spite of flecainide therapy, the patient may be considered for EP testing and ablation or  perhaps a switch to low-dose amiodarone instead.    Thank you for this consultation.      SIGNATURE: Rubin Deleon MD   OFFICE: [phone]    ================================================================    REASON FOR CONSULTATION: Paroxysmal supraventricular tachycardia    HISTORY: Theodora Goel is a 78 y.o. white female with a history of hypertension and supraventricular arrhythmias who presented on 5/2/2024 because of altered mental status and some urinary incontinence.  She was found to be hypovolemic with prerenal azotemia (BUN 26, creatinine 0.62), and also had an E. coli urinary tract infection for which she is on antibiotics.  In hospital, she has been quite depressed over the terminal illness of a lifelong friend, and is being seen by psychiatry.  She also has had some runs of supraventricular tachycardia, including a spell on 5/3/2024 with rates in the 150s, resolving after intravenous beta-blockers, I believe.  She was seen in consultation by cardiologist Dr. Angela Garnica, who added low-dose flecainide to the patient's beta-blocker therapy.  Electrophysiology input was requested by the primary team.    PAST MEDICAL HISTORY: The patient has a history of hypertension, hyperlipidemia, and known coronary calcification (coronary calcium score 890 about 5 years ago) but negative functional studies (normal Lexiscan study in June 2022).  She is known to have normal left ventricular systolic function.  She has had prior runs of supraventricular tachycardia picked up by an insertable loop recorder, with questionable paroxysmal atrial fibrillation as well.  She has never been fully anticoagulated but has simply been on beta-blocker therapy.  She also has a history of asthma, chronic depression, and recurrent falling episodes.    PAST SURGICAL HISTORY: The patient underwent Medtronic Reveal LINQ (Model LNQ11) placement by Dr. Wilmer Liu on 1/20/2020, though that particular device really last  beyond 4 years in battery capacity.  Her only other surgery was nasal surgery after an accident.    OUTPATIENT MEDICATIONS:  Medication Sig    albuterol 90 mcg/actuation inhaler Inhale 2 puffs every 4 hours if needed for shortness of breath or wheezing.    alendronate (Fosamax) 70 mg tablet Take 1 tablet (70 mg) by mouth every 7 days. Take in the morning with a full glass of water, on an empty stomach, and do not take anything else by mouth or lie down for the next 30 min.    ALPRAZolam (Xanax) 0.25 mg tablet Take 1 tablet (0.25 mg) by mouth 2 times a day as needed for anxiety.    aspirin 81 mg EC tablet Take 1 tablet (81 mg) by mouth once daily.    buPROPion XL (Wellbutrin XL) 150 mg 24 hr tablet Take 1 tablet (150 mg) by mouth 2 times a day.    ketoconazole (NIZOral) 2 % shampoo Apply once to face prn rash (Patient taking differently: Apply 1 Application topically if needed for irritation.)    levothyroxine (Synthroid, Levoxyl) 125 mcg tablet Take 1 tablet (125 mcg) by mouth once daily.    rosuvastatin (Crestor) 20 mg tablet Take 1 tablet (20 mg) by mouth once daily at bedtime.    traMADol (Ultram) 50 mg tablet Take 1 tablet (50 mg) by mouth 2 times a day as needed for moderate pain (4 - 6).    valsartan (Diovan) 80 mg tablet Take 1 tablet (80 mg) by mouth once daily as needed (elevated BP).    atenolol (Tenormin) 50 mg tablet Take 1 tablet (50 mg) by mouth once daily. (Patient not taking: Reported on 5/2/2024)    DULoxetine (Cymbalta) 20 mg DR capsule Take 1 capsule (20 mg) by mouth once daily. Swallow whole. Do not crush or chew. (Patient not taking: Reported on 5/2/2024)    valsartan (Diovan) 160 mg tablet Take 1 tablet (160 mg) by mouth once daily as needed (for elevated BP). (Patient not taking: Reported on 5/2/2024)    zolpidem (Ambien) 5 mg tablet Take 1 tablet (5 mg) by mouth as needed at bedtime for sleep. (Patient not taking: Reported on 5/2/2024)     INPATIENT CARDIAC MEDICATIONS:    aspirin EC tablet  "81 mg, 81 mg, oral, Daily    flecainide (Tambocor) tablet 50 mg, 50 mg, oral, q12h    metoprolol tartrate (Lopressor) tablet 25 mg, 25 mg, oral, BID    rosuvastatin (Crestor) tablet 20 mg, 20 mg, oral, Nightly    valsartan (Diovan) tablet 80 mg, 80 mg, oral, Daily    ALLERGIES AND DRUG INTOLERANCES: No Known Allergies    FAMILY HISTORY: Not contributory    SOCIAL HISTORY: The patient is  and has 3 children, only 1 of whom lives locally.  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  She quit smoking in 1990.  She rarely drinks alcohol or caffeinated beverages.  She lives independently but admits to being quite inactive.  She still drives an automobile, however.    COMPLETE REVIEW OF SYSTEMS:  GENERAL: Negative for weight gain or loss  HEENT: Negative for vision or hearing problems  RESPIRATORY: Negative for orthopnea, exertional dyspnea, or productive cough  CARDIAC: Negative for exertional chest discomfort; positive for occasional palpitations  VASCULAR: Negative for claudication or peripheral edema  GI: Negative for nausea, vomiting, hematemesis, melena, or hematochezia  MUSCULOSKELETAL: Negative for major arthralgias  ENDOCRINE: Negative for heat or cold intolerance, polyuria or polydipsia  HEMATOLOGIC: Negative for bleeding problems  CUTANEOUS: Negative for rashes  NEURO: Negative for seizures; negative for focal neurologic symptoms; negative for near-syncope or syncope  PSYCH: Positive for anxiety and depression over the terminal illness of a close friend    PHYSICAL EXAM:  /84 (Patient Position: Standing)   Pulse 77   Temp 36.6 °C (97.9 °F)   Resp 18   Ht 1.676 m (5' 5.98\")   Wt 72.8 kg (160 lb 7.9 oz)   SpO2 93%   BMI 25.92 kg/m²   Gen: Pleasant white female in no distress; alert and oriented x 3 at present  HEENT: Unremarkable  Neck: No jugular venous distention noted  Left parasternal area: Insertable loop recorder palpable in mid left chest  Cardiac: " Regular rhythm with grade 1/6 systolic ejection murmur and preserved S2  Resp: Clear to auscultation bilaterally, without wheezes or rales  Abd: Non-distended, with no tenderness, no masses, and no organomegaly noted  Ext: Peripheral pulses intact; no peripheral edema  Neuro: Normal gross motor and sensory function; no focal deficits noted  Skin: No lesions noted    EKG (5/2/2024): Sinus rhythm at 63 bpm with a minor first-degree AV block (OR 0.22 seconds), and left anterior fascicular block  EKG (5/3/2024): Sustained supraventricular tachycardia at 154 bpm, with P waves superimposed on prior T waves, consistent with atrial tachycardia  EKG (5/5/2024): Sinus rhythm at 71 bpm with arm lead reversal but same pattern as 5/2/2024 EKG otherwise    ECHOCARDIOGRAM (1/2024): LVEF 65% with moderate left ventricular hypertrophy, no atrial enlargement, no significant valvular abnormalities    LABS:  Lab Results   Component Value Date    GLUCOSE 88 05/06/2024    CALCIUM 9.5 05/06/2024     05/06/2024    K 4.1 05/06/2024    CO2 28 05/06/2024     05/06/2024    BUN 34 (H) 05/06/2024    CREATININE 0.87 05/06/2024      Lab Results   Component Value Date    WBC 5.3 05/06/2024    HGB 11.2 (L) 05/06/2024    HCT 36.3 05/06/2024     (H) 05/06/2024     05/06/2024

## 2024-05-07 VITALS
HEART RATE: 65 BPM | RESPIRATION RATE: 20 BRPM | TEMPERATURE: 97.3 F | BODY MASS INDEX: 25.79 KG/M2 | DIASTOLIC BLOOD PRESSURE: 73 MMHG | SYSTOLIC BLOOD PRESSURE: 118 MMHG | WEIGHT: 160.5 LBS | OXYGEN SATURATION: 97 % | HEIGHT: 66 IN

## 2024-05-07 LAB
ANION GAP SERPL CALC-SCNC: 13 MMOL/L (ref 10–20)
BUN SERPL-MCNC: 39 MG/DL (ref 6–23)
CALCIUM SERPL-MCNC: 9.5 MG/DL (ref 8.6–10.3)
CHLORIDE SERPL-SCNC: 106 MMOL/L (ref 98–107)
CO2 SERPL-SCNC: 27 MMOL/L (ref 21–32)
CREAT SERPL-MCNC: 0.74 MG/DL (ref 0.5–1.05)
EGFRCR SERPLBLD CKD-EPI 2021: 83 ML/MIN/1.73M*2
ERYTHROCYTE [DISTWIDTH] IN BLOOD BY AUTOMATED COUNT: 12.6 % (ref 11.5–14.5)
GLUCOSE SERPL-MCNC: 91 MG/DL (ref 74–99)
HCT VFR BLD AUTO: 35.4 % (ref 36–46)
HGB BLD-MCNC: 11 G/DL (ref 12–16)
MAGNESIUM SERPL-MCNC: 1.87 MG/DL (ref 1.6–2.4)
MCH RBC QN AUTO: 31.9 PG (ref 26–34)
MCHC RBC AUTO-ENTMCNC: 31.1 G/DL (ref 32–36)
MCV RBC AUTO: 103 FL (ref 80–100)
NRBC BLD-RTO: 0 /100 WBCS (ref 0–0)
PLATELET # BLD AUTO: 190 X10*3/UL (ref 150–450)
POTASSIUM SERPL-SCNC: 3.7 MMOL/L (ref 3.5–5.3)
RBC # BLD AUTO: 3.45 X10*6/UL (ref 4–5.2)
SODIUM SERPL-SCNC: 142 MMOL/L (ref 136–145)
WBC # BLD AUTO: 5 X10*3/UL (ref 4.4–11.3)

## 2024-05-07 PROCEDURE — 97535 SELF CARE MNGMENT TRAINING: CPT | Mod: GO,CO

## 2024-05-07 PROCEDURE — 2500000006 HC RX 250 W HCPCS SELF ADMINISTERED DRUGS (ALT 637 FOR ALL PAYERS): Performed by: STUDENT IN AN ORGANIZED HEALTH CARE EDUCATION/TRAINING PROGRAM

## 2024-05-07 PROCEDURE — 85027 COMPLETE CBC AUTOMATED: CPT | Performed by: NURSE PRACTITIONER

## 2024-05-07 PROCEDURE — 2500000005 HC RX 250 GENERAL PHARMACY W/O HCPCS: Performed by: STUDENT IN AN ORGANIZED HEALTH CARE EDUCATION/TRAINING PROGRAM

## 2024-05-07 PROCEDURE — 2500000001 HC RX 250 WO HCPCS SELF ADMINISTERED DRUGS (ALT 637 FOR MEDICARE OP): Performed by: PSYCHIATRY & NEUROLOGY

## 2024-05-07 PROCEDURE — 2500000001 HC RX 250 WO HCPCS SELF ADMINISTERED DRUGS (ALT 637 FOR MEDICARE OP): Performed by: REGISTERED NURSE

## 2024-05-07 PROCEDURE — 99232 SBSQ HOSP IP/OBS MODERATE 35: CPT | Performed by: PSYCHIATRY & NEUROLOGY

## 2024-05-07 PROCEDURE — 2500000001 HC RX 250 WO HCPCS SELF ADMINISTERED DRUGS (ALT 637 FOR MEDICARE OP): Performed by: INTERNAL MEDICINE

## 2024-05-07 PROCEDURE — 97116 GAIT TRAINING THERAPY: CPT | Mod: GP,CQ

## 2024-05-07 PROCEDURE — 99231 SBSQ HOSP IP/OBS SF/LOW 25: CPT | Performed by: NURSE PRACTITIONER

## 2024-05-07 PROCEDURE — 80048 BASIC METABOLIC PNL TOTAL CA: CPT | Performed by: NURSE PRACTITIONER

## 2024-05-07 PROCEDURE — 2500000001 HC RX 250 WO HCPCS SELF ADMINISTERED DRUGS (ALT 637 FOR MEDICARE OP): Performed by: STUDENT IN AN ORGANIZED HEALTH CARE EDUCATION/TRAINING PROGRAM

## 2024-05-07 PROCEDURE — 83735 ASSAY OF MAGNESIUM: CPT | Performed by: NURSE PRACTITIONER

## 2024-05-07 PROCEDURE — 36415 COLL VENOUS BLD VENIPUNCTURE: CPT | Performed by: NURSE PRACTITIONER

## 2024-05-07 PROCEDURE — 2500000005 HC RX 250 GENERAL PHARMACY W/O HCPCS: Performed by: INTERNAL MEDICINE

## 2024-05-07 RX ORDER — LIDOCAINE 560 MG/1
1 PATCH PERCUTANEOUS; TOPICAL; TRANSDERMAL DAILY
Status: CANCELLED
Start: 2024-05-08 | End: 2024-05-22

## 2024-05-07 RX ORDER — VALSARTAN 80 MG/1
80 TABLET ORAL DAILY
Start: 2024-05-08 | End: 2024-06-07

## 2024-05-07 RX ORDER — LIDOCAINE 560 MG/1
1 PATCH PERCUTANEOUS; TOPICAL; TRANSDERMAL DAILY
Start: 2024-05-08 | End: 2024-05-22

## 2024-05-07 RX ORDER — ARIPIPRAZOLE 5 MG/1
5 TABLET ORAL DAILY
Status: CANCELLED
Start: 2024-05-08 | End: 2024-06-07

## 2024-05-07 RX ORDER — FLECAINIDE ACETATE 50 MG/1
50 TABLET ORAL EVERY 12 HOURS SCHEDULED
Start: 2024-05-07 | End: 2024-06-06

## 2024-05-07 RX ORDER — TALC
6 POWDER (GRAM) TOPICAL NIGHTLY PRN
Start: 2024-05-07 | End: 2024-05-21

## 2024-05-07 RX ORDER — NITROFURANTOIN 25; 75 MG/1; MG/1
100 CAPSULE ORAL EVERY 12 HOURS SCHEDULED
Qty: 1 CAPSULE | Refills: 0 | Status: SHIPPED | OUTPATIENT
Start: 2024-05-07 | End: 2024-05-08

## 2024-05-07 RX ORDER — METOPROLOL TARTRATE 25 MG/1
25 TABLET, FILM COATED ORAL 2 TIMES DAILY
Status: CANCELLED
Start: 2024-05-07 | End: 2024-06-06

## 2024-05-07 RX ORDER — VALSARTAN 80 MG/1
80 TABLET ORAL DAILY
Status: CANCELLED
Start: 2024-05-08 | End: 2024-06-07

## 2024-05-07 RX ORDER — MIRTAZAPINE 7.5 MG/1
7.5 TABLET, FILM COATED ORAL NIGHTLY
Status: CANCELLED
Start: 2024-05-07 | End: 2024-06-06

## 2024-05-07 RX ORDER — METOPROLOL TARTRATE 25 MG/1
25 TABLET, FILM COATED ORAL 2 TIMES DAILY
Start: 2024-05-07 | End: 2024-06-06

## 2024-05-07 RX ORDER — MIRTAZAPINE 7.5 MG/1
7.5 TABLET, FILM COATED ORAL NIGHTLY
Start: 2024-05-07 | End: 2024-05-21

## 2024-05-07 RX ORDER — TALC
6 POWDER (GRAM) TOPICAL NIGHTLY PRN
Status: CANCELLED
Start: 2024-05-07 | End: 2024-05-21

## 2024-05-07 RX ORDER — ACETAMINOPHEN 325 MG/1
650 TABLET ORAL EVERY 4 HOURS PRN
Status: CANCELLED
Start: 2024-05-07 | End: 2024-05-12

## 2024-05-07 RX ORDER — NITROFURANTOIN 25; 75 MG/1; MG/1
100 CAPSULE ORAL EVERY 12 HOURS SCHEDULED
Qty: 1 CAPSULE | Refills: 0 | Status: CANCELLED | OUTPATIENT
Start: 2024-05-07 | End: 2024-05-08

## 2024-05-07 RX ORDER — ARIPIPRAZOLE 5 MG/1
5 TABLET ORAL DAILY
Start: 2024-05-08 | End: 2024-06-07

## 2024-05-07 RX ORDER — FLECAINIDE ACETATE 50 MG/1
50 TABLET ORAL EVERY 12 HOURS SCHEDULED
Status: CANCELLED
Start: 2024-05-07 | End: 2024-06-06

## 2024-05-07 RX ORDER — TRAZODONE HYDROCHLORIDE 50 MG/1
25 TABLET ORAL NIGHTLY PRN
Start: 2024-05-07 | End: 2024-05-21

## 2024-05-07 RX ORDER — TRAZODONE HYDROCHLORIDE 50 MG/1
25 TABLET ORAL NIGHTLY PRN
Status: CANCELLED
Start: 2024-05-07 | End: 2024-05-21

## 2024-05-07 RX ADMIN — NITROFURANTOIN MONOHYDRATE/MACROCRYSTALS 100 MG: 25; 75 CAPSULE ORAL at 08:25

## 2024-05-07 RX ADMIN — METOPROLOL TARTRATE 25 MG: 25 TABLET, FILM COATED ORAL at 20:53

## 2024-05-07 RX ADMIN — NITROFURANTOIN MONOHYDRATE/MACROCRYSTALS 100 MG: 25; 75 CAPSULE ORAL at 20:53

## 2024-05-07 RX ADMIN — PANTOPRAZOLE SODIUM 40 MG: 40 TABLET, DELAYED RELEASE ORAL at 06:07

## 2024-05-07 RX ADMIN — VALSARTAN 80 MG: 80 TABLET, FILM COATED ORAL at 08:25

## 2024-05-07 RX ADMIN — ASPIRIN 81 MG: 81 TABLET, COATED ORAL at 08:25

## 2024-05-07 RX ADMIN — ONDANSETRON 4 MG: 4 TABLET, ORALLY DISINTEGRATING ORAL at 14:13

## 2024-05-07 RX ADMIN — FLECAINIDE ACETATE 50 MG: 50 TABLET ORAL at 20:52

## 2024-05-07 RX ADMIN — ACETAMINOPHEN 650 MG: 325 TABLET ORAL at 14:12

## 2024-05-07 RX ADMIN — METOPROLOL TARTRATE 25 MG: 25 TABLET, FILM COATED ORAL at 08:25

## 2024-05-07 RX ADMIN — FLECAINIDE ACETATE 50 MG: 50 TABLET ORAL at 08:25

## 2024-05-07 RX ADMIN — MIRTAZAPINE 7.5 MG: 15 TABLET, FILM COATED ORAL at 20:52

## 2024-05-07 RX ADMIN — ARIPIPRAZOLE 5 MG: 5 TABLET ORAL at 08:25

## 2024-05-07 RX ADMIN — ACETAMINOPHEN 650 MG: 325 TABLET ORAL at 08:24

## 2024-05-07 RX ADMIN — LIDOCAINE 4% 1 PATCH: 40 PATCH TOPICAL at 08:25

## 2024-05-07 RX ADMIN — ROSUVASTATIN CALCIUM 20 MG: 20 TABLET, FILM COATED ORAL at 20:52

## 2024-05-07 ASSESSMENT — COGNITIVE AND FUNCTIONAL STATUS - GENERAL
TOILETING: A LITTLE
DRESSING REGULAR UPPER BODY CLOTHING: A LITTLE
CLIMB 3 TO 5 STEPS WITH RAILING: TOTAL
PERSONAL GROOMING: A LITTLE
TURNING FROM BACK TO SIDE WHILE IN FLAT BAD: A LITTLE
MOBILITY SCORE: 15
PERSONAL GROOMING: A LITTLE
DAILY ACTIVITIY SCORE: 16
TURNING FROM BACK TO SIDE WHILE IN FLAT BAD: A LITTLE
DRESSING REGULAR UPPER BODY CLOTHING: A LITTLE
MOVING FROM LYING ON BACK TO SITTING ON SIDE OF FLAT BED WITH BEDRAILS: A LITTLE
MOVING FROM LYING ON BACK TO SITTING ON SIDE OF FLAT BED WITH BEDRAILS: A LITTLE
STANDING UP FROM CHAIR USING ARMS: A LOT
MOVING TO AND FROM BED TO CHAIR: A LOT
TOILETING: A LOT
MOVING FROM LYING ON BACK TO SITTING ON SIDE OF FLAT BED WITH BEDRAILS: A LITTLE
DRESSING REGULAR LOWER BODY CLOTHING: A LITTLE
MOVING TO AND FROM BED TO CHAIR: A LOT
CLIMB 3 TO 5 STEPS WITH RAILING: A LOT
DRESSING REGULAR LOWER BODY CLOTHING: A LOT
CLIMB 3 TO 5 STEPS WITH RAILING: A LOT
STANDING UP FROM CHAIR USING ARMS: A LOT
STANDING UP FROM CHAIR USING ARMS: A LOT
DAILY ACTIVITIY SCORE: 16
DRESSING REGULAR LOWER BODY CLOTHING: A LOT
MOVING TO AND FROM BED TO CHAIR: A LITTLE
WALKING IN HOSPITAL ROOM: A LITTLE
HELP NEEDED FOR BATHING: A LITTLE
MOBILITY SCORE: 15
HELP NEEDED FOR BATHING: A LOT
MOBILITY SCORE: 15
HELP NEEDED FOR BATHING: A LOT
WALKING IN HOSPITAL ROOM: A LITTLE
TOILETING: A LOT
DAILY ACTIVITIY SCORE: 20
DRESSING REGULAR UPPER BODY CLOTHING: A LITTLE
TURNING FROM BACK TO SIDE WHILE IN FLAT BAD: A LITTLE
WALKING IN HOSPITAL ROOM: A LITTLE

## 2024-05-07 ASSESSMENT — PAIN DESCRIPTION - LOCATION: LOCATION: KNEE

## 2024-05-07 ASSESSMENT — PAIN SCALES - GENERAL
PAINLEVEL_OUTOF10: 8
PAINLEVEL_OUTOF10: 5 - MODERATE PAIN
PAINLEVEL_OUTOF10: 2

## 2024-05-07 ASSESSMENT — ACTIVITIES OF DAILY LIVING (ADL): HOME_MANAGEMENT_TIME_ENTRY: 24

## 2024-05-07 ASSESSMENT — PAIN - FUNCTIONAL ASSESSMENT
PAIN_FUNCTIONAL_ASSESSMENT: 0-10

## 2024-05-07 ASSESSMENT — PAIN DESCRIPTION - ORIENTATION: ORIENTATION: LEFT

## 2024-05-07 NOTE — PROGRESS NOTES
"Theodora Goel is a 78 y.o. female on day 2 of admission presenting with Weakness.    SUBJECTIVE:      Pt report feeling better mentally  Denies depression  No SI or HI  No paranoid thoughts  Pt is alert and oriented x 2  Has been cooperating with all care  Pt is agreeable to go to SNF    Exam:  Vital Signs: /88   Pulse 79   Temp 36.5 °C (97.7 °F)   Resp 18   Ht 1.676 m (5' 5.98\")   Wt 72.8 kg (160 lb 7.9 oz)   SpO2 96%   BMI 25.92 kg/m²   Musculoskeletal:  normal  Gait/Station:  in bed      Mental Status Exam:  General Appearance: { Psych Appearance:[id number]  Speech: normal pitch and normal volume  Mood: sad  Affect: appropriate  Thought Process: Linear, goal directed  Thought Associations: No loosening of associations  Thought Content: normal   Perception: denies  Level of Consciousness: Alert  Orientation: Alert and oriented to person, place, time and situation  Attention and Concentration: Mild impairment in attention  Cognition: waxing and waning   Insight: good  Judgment: good     Results for orders placed or performed during the hospital encounter of 05/02/24 (from the past 96 hour(s))   Electrocardiogram, 12-lead PRN ACS symptoms   Result Value Ref Range    Ventricular Rate 154 BPM    Atrial Rate 80 BPM    QRS Duration 78 ms    QT Interval 318 ms    QTC Calculation(Bazett) 509 ms    R Axis -36 degrees    T Axis 72 degrees    QRS Count 25 beats    Q Onset 218 ms    T Offset 377 ms    QTC Fredericia 435 ms   CBC   Result Value Ref Range    WBC 4.2 (L) 4.4 - 11.3 x10*3/uL    nRBC 0.0 0.0 - 0.0 /100 WBCs    RBC 3.21 (L) 4.00 - 5.20 x10*6/uL    Hemoglobin 10.3 (L) 12.0 - 16.0 g/dL    Hematocrit 32.8 (L) 36.0 - 46.0 %     (H) 80 - 100 fL    MCH 32.1 26.0 - 34.0 pg    MCHC 31.4 (L) 32.0 - 36.0 g/dL    RDW 12.9 11.5 - 14.5 %    Platelets 184 150 - 450 x10*3/uL   Basic Metabolic Panel   Result Value Ref Range    Glucose 93 74 - 99 mg/dL    Sodium 141 136 - 145 mmol/L    Potassium 3.4 (L) " 3.5 - 5.3 mmol/L    Chloride 109 (H) 98 - 107 mmol/L    Bicarbonate 26 21 - 32 mmol/L    Anion Gap 9 (L) 10 - 20 mmol/L    Urea Nitrogen 19 6 - 23 mg/dL    Creatinine 0.63 0.50 - 1.05 mg/dL    eGFR >90 >60 mL/min/1.73m*2    Calcium 9.1 8.6 - 10.3 mg/dL   Magnesium   Result Value Ref Range    Magnesium 1.59 (L) 1.60 - 2.40 mg/dL   Phosphorus   Result Value Ref Range    Phosphorus 4.4 2.5 - 4.9 mg/dL   ECG 12 Lead   Result Value Ref Range    Ventricular Rate 71 BPM    Atrial Rate 71 BPM    MS Interval 186 ms    QRS Duration 94 ms    QT Interval 420 ms    QTC Calculation(Bazett) 456 ms    P Axis 153 degrees    R Axis 209 degrees    T Axis 159 degrees    QRS Count 11 beats    Q Onset 218 ms    P Onset 125 ms    P Offset 180 ms    T Offset 428 ms    QTC Fredericia 444 ms   CBC   Result Value Ref Range    WBC 5.4 4.4 - 11.3 x10*3/uL    nRBC 0.0 0.0 - 0.0 /100 WBCs    RBC 3.44 (L) 4.00 - 5.20 x10*6/uL    Hemoglobin 10.9 (L) 12.0 - 16.0 g/dL    Hematocrit 35.0 (L) 36.0 - 46.0 %     (H) 80 - 100 fL    MCH 31.7 26.0 - 34.0 pg    MCHC 31.1 (L) 32.0 - 36.0 g/dL    RDW 13.0 11.5 - 14.5 %    Platelets 197 150 - 450 x10*3/uL   Basic Metabolic Panel   Result Value Ref Range    Glucose 96 74 - 99 mg/dL    Sodium 141 136 - 145 mmol/L    Potassium 3.8 3.5 - 5.3 mmol/L    Chloride 107 98 - 107 mmol/L    Bicarbonate 27 21 - 32 mmol/L    Anion Gap 11 10 - 20 mmol/L    Urea Nitrogen 26 (H) 6 - 23 mg/dL    Creatinine 0.64 0.50 - 1.05 mg/dL    eGFR >90 >60 mL/min/1.73m*2    Calcium 9.2 8.6 - 10.3 mg/dL   Magnesium   Result Value Ref Range    Magnesium 1.76 1.60 - 2.40 mg/dL   Phosphorus   Result Value Ref Range    Phosphorus 4.4 2.5 - 4.9 mg/dL   Vitamin B12   Result Value Ref Range    Vitamin B12 194 (L) 211 - 911 pg/mL   Folate   Result Value Ref Range    Folate, Serum 6.0 >5.0 ng/mL   Vitamin D 25-Hydroxy,Total (for eval of Vitamin D levels)   Result Value Ref Range    Vitamin D, 25-Hydroxy, Total 26 (L) 30 - 100 ng/mL   CBC    Result Value Ref Range    WBC 5.3 4.4 - 11.3 x10*3/uL    nRBC 0.0 0.0 - 0.0 /100 WBCs    RBC 3.52 (L) 4.00 - 5.20 x10*6/uL    Hemoglobin 11.2 (L) 12.0 - 16.0 g/dL    Hematocrit 36.3 36.0 - 46.0 %     (H) 80 - 100 fL    MCH 31.8 26.0 - 34.0 pg    MCHC 30.9 (L) 32.0 - 36.0 g/dL    RDW 12.9 11.5 - 14.5 %    Platelets 196 150 - 450 x10*3/uL   Basic Metabolic Panel   Result Value Ref Range    Glucose 88 74 - 99 mg/dL    Sodium 142 136 - 145 mmol/L    Potassium 4.1 3.5 - 5.3 mmol/L    Chloride 106 98 - 107 mmol/L    Bicarbonate 28 21 - 32 mmol/L    Anion Gap 12 10 - 20 mmol/L    Urea Nitrogen 34 (H) 6 - 23 mg/dL    Creatinine 0.87 0.50 - 1.05 mg/dL    eGFR 68 >60 mL/min/1.73m*2    Calcium 9.5 8.6 - 10.3 mg/dL   Magnesium   Result Value Ref Range    Magnesium 1.83 1.60 - 2.40 mg/dL   Phosphorus   Result Value Ref Range    Phosphorus 5.7 (H) 2.5 - 4.9 mg/dL   Basic Metabolic Panel   Result Value Ref Range    Glucose 91 74 - 99 mg/dL    Sodium 142 136 - 145 mmol/L    Potassium 3.7 3.5 - 5.3 mmol/L    Chloride 106 98 - 107 mmol/L    Bicarbonate 27 21 - 32 mmol/L    Anion Gap 13 10 - 20 mmol/L    Urea Nitrogen 39 (H) 6 - 23 mg/dL    Creatinine 0.74 0.50 - 1.05 mg/dL    eGFR 83 >60 mL/min/1.73m*2    Calcium 9.5 8.6 - 10.3 mg/dL   Magnesium   Result Value Ref Range    Magnesium 1.87 1.60 - 2.40 mg/dL   CBC   Result Value Ref Range    WBC 5.0 4.4 - 11.3 x10*3/uL    nRBC 0.0 0.0 - 0.0 /100 WBCs    RBC 3.45 (L) 4.00 - 5.20 x10*6/uL    Hemoglobin 11.0 (L) 12.0 - 16.0 g/dL    Hematocrit 35.4 (L) 36.0 - 46.0 %     (H) 80 - 100 fL    MCH 31.9 26.0 - 34.0 pg    MCHC 31.1 (L) 32.0 - 36.0 g/dL    RDW 12.6 11.5 - 14.5 %    Platelets 190 150 - 450 x10*3/uL         Risk Assessment:  Suicide low    Impression:   Delirium - resolving   MDD moderate       Recommendations:    Continue Remeron 7.5mg at bedtime  Continue Abilify 5mg QAM  Pt can be discharged from psych standpoint when medically stable    Thank you for allowing us  to participate in the care of this patient.       Chad Hobbs MD  5/7/2024  3:51 PM

## 2024-05-07 NOTE — CARE PLAN
The patient's goals for the shift include Remain hemodynamically stable    The clinical goals for the shift include see plan of care      Problem: Pain  Goal: My pain/discomfort is manageable  Outcome: Progressing     Problem: Safety  Goal: Patient will be injury free during hospitalization  Outcome: Progressing  Goal: I will remain free of falls  Outcome: Progressing     Problem: Daily Care  Goal: Daily care needs are met  Outcome: Progressing     Problem: Psychosocial Needs  Goal: Demonstrates ability to cope with hospitalization/illness  Outcome: Progressing  Goal: Collaborate with me, my family, and caregiver to identify my specific goals  Outcome: Progressing

## 2024-05-07 NOTE — PROGRESS NOTES
Occupational Therapy    OT Treatment    Patient Name: Theodora Goel  MRN: [id number]  Today's Date: 5/7/2024  Time Calculation  Start Time: 1305  Stop Time: 1329  Time Calculation (min): 24 min         Assessment:  End of Session Communication: Bedside nurse  End of Session Patient Position: Up in chair, Alarm on       Plan:  OT Frequency: 3 times per week  OT Discharge Recommendations: Moderate intensity level of continued care     Subjective        05/07/24 1305   OT Last Visit   OT Received On 05/07/24   General   Prior to Session Communication Bedside nurse   Patient Position Received Bed, 3 rail up;Alarm on   Preferred Learning Style verbal;visual   General Comment Pt very pleasant and cooperative.   Pain Assessment   Pain Assessment 0-10   Pain Score 2   Pain Location Knee   Pain Orientation Left   Cognition   Overall Cognitive Status WFL   Orientation Level Oriented X4   Grooming   Grooming Level of Assistance Contact guard   Grooming Where Assessed Standing sinkside   Grooming Comments Pt completed grooming tasks standing at sink, cues to step closer to sink to promote balance and safety.   Bed Mobility   Bed Mobility Yes   Bed Mobility 1   Bed Mobility 1 Supine to sitting   Level of Assistance 1 Moderate assistance   Bed Mobility Comments 1 Utilized draw sheet to advance pt out of curve in bed   Transfers   Transfer Yes   Transfer 1   Transfer From 1 Sit to   Transfer to 1 Stand   Technique 1 Sit to stand   Transfer Device 1 Walker;Gait belt   Transfer Level of Assistance 1 Minimum assistance;+2   Trials/Comments 1 Cues for UE placement to push up from seated surface and reach back before sitting. FM performed within household distances Min A, cues for AD management and to stay within fww. bed>sink>hallway>chair   IP OT Assessment   End of Session Communication Bedside nurse   End of Session Patient Position Up in chair;Alarm on   Inpatient Plan   OT Frequency 3 times per week   OT Discharge  Recommendations Moderate intensity level of continued care       Outcome Measures:[facility] Daily Activity  Putting on and taking off regular lower body clothing: A lot  Bathing (including washing, rinsing, drying): A lot  Putting on and taking off regular upper body clothing: A little  Toileting, which includes using toilet, bedpan or urinal: A lot  Taking care of personal grooming such as brushing teeth: A little  Eating Meals: None  Daily Activity - Total Score: 16  Education Documentation  Body Mechanics, taught by [facility] at 5/7/2024  2:39 PM.  Learner: Patient  Readiness: Acceptance  Method: Demonstration, Explanation  Response: Needs Reinforcement, Demonstrated Understanding, Verbalizes Understanding    Precautions, taught by [facility] at 5/7/2024  2:39 PM.  Learner: Patient  Readiness: Acceptance  Method: Demonstration, Explanation  Response: Needs Reinforcement, Demonstrated Understanding, Verbalizes Understanding    Education Comments  No comments found.            Goals:  Encounter Problems       Encounter Problems (Active)       OT Goals       OT Goal 1 (Progressing)       Start:  05/03/24    Expected End:  05/13/24       Pt will complete all bed mobility with CGA safely            OT Goal 2 (Progressing)       Start:  05/03/24    Expected End:  05/13/24       Pt will complete ADL's and mobility with good sit balance and fair+ stand balance            OT Goal 3 (Progressing)       Start:  05/03/24    Expected End:  05/13/24       Pt with complete all transfers safely with Min A safely            OT Goal 4 (Progressing)       Start:  05/03/24    Expected End:  05/13/24       Pt will complete UB dressing ADL's with CGA using adaptive device(s) as needed           OT Goal 5 (Progressing)       Start:  05/03/24    Expected End:  05/13/24       Pt with complete grooming ADL's with supervision after setup

## 2024-05-07 NOTE — DISCHARGE INSTRUCTIONS
Discharge Diagnosis  Weakness  UTI  Tachycardia    Issues Requiring Follow-Up  Follow up with PCP within 1 week of discharge  Follow-up with Dr. Wilmer Liu at the [place] office within 1 week of discharge or as soon as possible  Follow up with psychiatry outpatient within 2-4 weeks  Continue flecainide 50mg BID and metop 25mg BID   Valsartan dose has changed   Stop xanax, stop Ultram, stop Ambien  Outpatient palliative care to follow at [facility]  Lidocaine patch for left knee or Tylenol PRN

## 2024-05-07 NOTE — DISCHARGE SUMMARY
Discharge Diagnosis  Weakness  UTI  Tachycardia    Issues Requiring Follow-Up  Follow up with PCP within 1 week of discharge  Follow-up with Dr. Wilmer Liu at the [place] office within 1 week of discharge  Follow up with psychiatry outpatient within 2-4 weeks  Continue flecainide 50mg BID and metop 25mg BID   Valsartan dose has changed   Stop xanax, stop Ultram, stop Ambien  Outpatient palliative care to follow at [facility]  Lidocaine patch for left knee or Tylenol PRN    Discharge Meds     Your medication list        ASK your doctor about these medications        Instructions Last Dose Given Next Dose Due   albuterol 90 mcg/actuation inhaler      Inhale 2 puffs every 4 hours if needed for shortness of breath or wheezing.       alendronate 70 mg tablet  Commonly known as: Fosamax      Take 1 tablet (70 mg) by mouth every 7 days. Take in the morning with a full glass of water, on an empty stomach, and do not take anything else by mouth or lie down for the next 30 min.       ALPRAZolam 0.25 mg tablet  Commonly known as: Xanax      Take 1 tablet (0.25 mg) by mouth 2 times a day as needed for anxiety.       aspirin 81 mg EC tablet           atenolol 50 mg tablet  Commonly known as: Tenormin      Take 1 tablet (50 mg) by mouth once daily.       buPROPion  mg 24 hr tablet  Commonly known as: Wellbutrin XL      Take 1 tablet (150 mg) by mouth 2 times a day.       DULoxetine 20 mg DR capsule  Commonly known as: Cymbalta      Take 1 capsule (20 mg) by mouth once daily. Swallow whole. Do not crush or chew.       ketoconazole 2 % shampoo  Commonly known as: NIZOral      Apply once to face prn rash       levothyroxine 125 mcg tablet  Commonly known as: Synthroid, Levoxyl      Take 1 tablet (125 mcg) by mouth once daily.       rosuvastatin 20 mg tablet  Commonly known as: Crestor           traMADol 50 mg tablet  Commonly known as: Ultram      Take 1 tablet (50 mg) by mouth 2 times a day as needed for moderate pain (4 - 6).        valsartan 80 mg tablet  Commonly known as: Diovan           valsartan 160 mg tablet  Commonly known as: Diovan      Take 1 tablet (160 mg) by mouth once daily as needed (for elevated BP).       zolpidem 5 mg tablet  Commonly known as: Ambien      Take 1 tablet (5 mg) by mouth as needed at bedtime for sleep.                Test Results Pending At Discharge  Pending Labs       No current pending labs.            Hospital Course  Theodora Goel is a 78 y.o. female presenting with family members at bedside who endorse increasing fall frequency as well as increased confusion.  Of note, patient was recently admitted and discharged to an outside facility for similar symptoms.  At that time, it was believed that patient's symptoms were related to dehydration, transient hypotension, as well as the use of multiple sedating medications including tramadol, Xanax, as well as Ambien.  It was recommended that patient discontinue these medications however per report, they have not been discontinued.  Patient has PT/OT set up following hospital discharge.  Per report, had outpatient neurology appointment scheduled but declined going.    She continues to endorse weakness and reports episodes of urinary incontinence.  Denies fecal incontinence.  Denies any other associated symptoms.  Does endorse feeling sad and overwhelmed with a friend's terminal illness.  No suicidal thoughts.  Additional laboratory studies unremarkable.  Urinalysis currently pending.    Course: Treated with Rocephin and transitioned to Macrobid for urinary tract infection.  Ucx -pansensitive E. Coli. Seen by Psychiatry:-cont Remeron, start Abilify and melatonin PRN for insomnia. Hold Tramadol.  Discontinue xanax and Ambien. Would recommend to follow up with psychiatry within 2-4 weeks outpatient for follow up. Patient seen by EP and cardiology, started on flecanide 50mg BID and metop 25mg BID. Will need to schedule follow up with Dr. Liu within 1 week of  discharge.  LE dopplers neg for DVTs. Lidocaine patch for L knee or tylenol PRN.  Patient to be discharged to [facility] on DC. Patient needs to follow up with PCP within 1 week of discharge.     Pertinent Physical Exam At Time of Discharge  Physical Exam  Constitutional: Well developed, no distress, alert and cooperative  Skin: Warm and dry, scattered RLE bruising   Eyes: EOMI, clear sclera  ENMT: mucous membranes moist  Respiratory: Patent airways, CTAB  Cardiovascular: Regular, rate and rhythm  Abdominal: Nondistended, soft, non-tender, +BS  MSK: ROM intact, mild LLE swelling  Neuro: alert and oriented x 2-3, flat affect     Outpatient Follow-Up    Future Appointments   Date Time Provider Department Center   5/14/2024  3:15 PM Eric Martins MD [id number] None     Margarette Moore CNP  [facility]  [address] [address]  Phone: [phone] Fax: [phone]    ILDA Sims-YUMIKO

## 2024-05-07 NOTE — PROGRESS NOTES
Physical Therapy    Physical Therapy Treatment    Patient Name: Theodora Goel  MRN: [id number]  Today's Date: 5/7/2024  Time Calculation  Start Time: 1300  Stop Time: 1325  Time Calculation (min): 25 min          05/07/24 1300   PT  Visit   PT Received On 05/07/24   Response to Previous Treatment Patient with no complaints from previous session.   General   Referred By Dr. Chrissy Gupta   Past Medical History Relevant to Rehab HTN, AFib, frequent falls, OA   Missed Visit Yes   Prior to Session Communication Bedside nurse   Patient Position Received Bed, 3 rail up;Alarm on   Preferred Learning Style verbal;visual   Precautions   Medical Precautions Fall precautions   Pain Assessment   Pain Assessment 0-10   Pain Score 5 - Moderate pain   Pain Type Chronic pain   Pain Location Knee   Pain Orientation Left   Cognition   Overall Cognitive Status WFL   Orientation Level Oriented X4   Therapeutic Exercise   Therapeutic Exercise Performed No   Bed Mobility   Bed Mobility Yes   Bed Mobility 1   Bed Mobility 1 Supine to sitting   Level of Assistance 1 Moderate assistance  (to get out of the middle of bed)   Bed Mobility Comments 1 draw sheet   Ambulation/Gait Training   Ambulation/Gait Training Performed Yes   Ambulation/Gait Training 1   Surface 1 Level tile   Device 1 Rolling walker   Gait Support Devices Gait belt   Assistance 1 Minimum assistance   Quality of Gait 1 Diminished heel strike;Inconsistent stride length;Decreased step length;NBOS   Comments/Distance (ft) 1 35' x2   Transfers   Transfer Yes   Transfer 1   Transfer From 1 Bed to   Transfer to 1 Stand   Technique 1 Sit to stand   Transfer Device 1 Walker;Gait belt   Transfer Level of Assistance 1 Minimum assistance;+2   Trials/Comments 1 cues for hand placement and technique   Transfers 2   Transfer From 2 Stand to   Transfer to 2 Chair with arms   Technique 2 Stand to sit   Transfer Device 2 Walker   Transfer Level of Assistance 2 Minimum assistance    Trials/Comments 2 hand placement and cues for technique   Activity Tolerance   Endurance Tolerates 30 min exercise with multiple rests   Early Mobility/Exercise Safety Screen Proceed with mobilization - No exclusion criteria met   PT Assessment   PT Assessment Results Decreased strength;Decreased endurance;Impaired balance;Decreased mobility   Rehab Prognosis Good   End of Session Communication Bedside nurse   Assessment Comment eager to participate.  weaker LLE though performed all activity well with less assist.   End of Session Patient Position Up in chair;Alarm on   Outpatient Education   Individual(s) Educated Patient   Education Provided Fall Risk;Body Mechanics;POC;Posture   Risk and Benefits Discussed with Patient/Caregiver/Other yes   Patient/Caregiver Demonstrated Understanding yes   Plan of Care Discussed and Agreed Upon yes   Patient Response to Education Patient/Caregiver Verbalized Understanding of Information   PT Plan   Inpatient/Swing Bed or Outpatient Inpatient   PT Plan   Treatment/Interventions Bed mobility;Transfer training;Gait training   PT Plan Skilled PT   PT Frequency 3 times per week   PT Discharge Recommendations Moderate intensity level of continued care   Equipment Recommended upon Discharge Wheeled walker   PT Recommended Transfer Status Assist x1;Assistive device     Outcome Measures:  [facility] Basic Mobility  Turning from your back to your side while in a flat bed without using bedrails: A little  Moving from lying on your back to sitting on the side of a flat bed without using bedrails: A little  Moving to and from bed to chair (including a wheelchair): A lot  Standing up from a chair using your arms (e.g. wheelchair or bedside chair): A lot  To walk in hospital room: A little  Climbing 3-5 steps with railing: A lot  Basic Mobility - Total Score: 15          EDUCATION:  Outpatient Education  Individual(s) Educated: Patient  Education Provided: Fall Risk, Body Mechanics, POC,  Posture  Risk and Benefits Discussed with Patient/Caregiver/Other: yes  Patient/Caregiver Demonstrated Understanding: yes  Plan of Care Discussed and Agreed Upon: yes  Patient Response to Education: Patient/Caregiver Verbalized Understanding of Information  Education Documentation  Body Mechanics, taught by Sotero Washington PTA at 5/7/2024  1:40 PM.  Learner: Patient  Readiness: Acceptance  Method: Explanation  Response: Verbalizes Understanding, Needs Reinforcement    Mobility Training, taught by Sotero Washington PTA at 5/7/2024  1:40 PM.  Learner: Patient  Readiness: Acceptance  Method: Explanation  Response: Verbalizes Understanding, Needs Reinforcement        GOALS:  Encounter Problems       Encounter Problems (Active)       PT Problem       Pt will demonstrate ability to perform all bed mobility tasks with SBA.  (Progressing)       Start:  05/03/24    Expected End:  05/13/24            Pt will be able to perform sit to stand transfer with CGA and min verbal and/or tactile cueing for increased functional independence.   (Progressing)       Start:  05/03/24    Expected End:  05/13/24            Pt will be able to ambulate 50 ft with LRAD and min A for increased functional mobility.   (Progressing)       Start:  05/03/24    Expected End:  05/13/24            Pt will demonstrate fair plus static and dynamic standing balance with CGA for increased ability to complete functional tasks and ADLs.  (Progressing)       Start:  05/03/24    Expected End:  05/13/24               Pain - Adult          Safety       LTG - Patient will adhere to hip precautions during ADL's and transfers       Start:  05/02/24    Expected End:  05/13/24            LTG - Patient will demonstrate safety requirements appropriate to situation/environment       Start:  05/02/24    Expected End:  05/13/24            LTG - Patient will utilize safety techniques       Start:  05/02/24    Expected End:  05/13/24            STG - Patient locks brakes on  wheelchair       Start:  05/02/24    Expected End:  05/13/24            STG - Patient uses call light consistently to request assistance with transfers       Start:  05/02/24    Expected End:  05/13/24            STG - Patient uses gait belt during all transfers       Start:  05/02/24    Expected End:  05/13/24            Goal 1       Start:  05/02/24    Expected End:  05/13/24            Goal 2       Start:  05/02/24    Expected End:  05/13/24            Goal 3       Start:  05/02/24    Expected End:  05/13/24

## 2024-05-07 NOTE — PROGRESS NOTES
05/07/24 1638   Discharge Planning   Home or Post Acute Services Post acute facilities (Rehab/SNF/etc)   Type of Post Acute Facility Services Skilled nursing   Patient expects to be discharged to: [facility]   Has discharge transport been arranged? Yes   What day is the transport expected? 05/07/24   What time is the transport expected? 1915     Received  d/c order. Asked the DSC to send the goldenrod, AVS and 7000. Transport has been set for wheelchair  at 7:15pm. Updated the pt, family, facility and nursing.

## 2024-05-07 NOTE — NURSING NOTE
0828 pt repositioned for safety and comfort. Pt medicated with tylenol for left knee pain, lidocaine  patch applied.   1148 notified  pt granddaughter at bedside wanting an update on time of discharge. Per CM pt got precert.   1415 pt medicated with tylenol for knee pain and medicated with zofran d/t vomiting when on the bedside commode. 2 whole pills in emesis, pt sts that she took OTC loperamide. Pt educated and notified that she should not take medications without medical staff administering. Pt sts that she no longer has any left. Pt had a very large soft / formed BM. Pt assisted back to bed. Bed alarm active. Call light w/I reach.   1855 report called to [facility]. P/U time 1915

## 2024-05-08 ENCOUNTER — NURSING HOME VISIT (OUTPATIENT)
Dept: POST ACUTE CARE | Facility: EXTERNAL LOCATION | Age: 79
End: 2024-05-08
Payer: MEDICARE

## 2024-05-08 ENCOUNTER — PATIENT OUTREACH (OUTPATIENT)
Dept: PRIMARY CARE | Facility: CLINIC | Age: 79
End: 2024-05-08

## 2024-05-08 DIAGNOSIS — R53.1 GENERALIZED WEAKNESS: ICD-10-CM

## 2024-05-08 DIAGNOSIS — Z79.891 LONG TERM (CURRENT) USE OF OPIATE ANALGESIC: ICD-10-CM

## 2024-05-08 DIAGNOSIS — I48.0 PAROXYSMAL ATRIAL FIBRILLATION (MULTI): ICD-10-CM

## 2024-05-08 DIAGNOSIS — I10 HYPERTENSION, UNSPECIFIED TYPE: ICD-10-CM

## 2024-05-08 DIAGNOSIS — I25.10 CORONARY ARTERY DISEASE INVOLVING NATIVE CORONARY ARTERY OF NATIVE HEART WITHOUT ANGINA PECTORIS: Primary | ICD-10-CM

## 2024-05-08 DIAGNOSIS — E78.5 HYPERLIPIDEMIA, UNSPECIFIED HYPERLIPIDEMIA TYPE: ICD-10-CM

## 2024-05-08 DIAGNOSIS — F32.A DEPRESSION, UNSPECIFIED DEPRESSION TYPE: ICD-10-CM

## 2024-05-08 DIAGNOSIS — E44.1 MILD PROTEIN-CALORIE MALNUTRITION (MULTI): ICD-10-CM

## 2024-05-08 DIAGNOSIS — R29.6 FREQUENT FALLS: ICD-10-CM

## 2024-05-08 DIAGNOSIS — N30.00 ACUTE CYSTITIS WITHOUT HEMATURIA: ICD-10-CM

## 2024-05-08 DIAGNOSIS — M81.0 AGE-RELATED OSTEOPOROSIS WITHOUT CURRENT PATHOLOGICAL FRACTURE: ICD-10-CM

## 2024-05-08 PROBLEM — S22.089A: Status: RESOLVED | Noted: 2023-02-03 | Resolved: 2024-05-08

## 2024-05-08 LAB
ATRIAL RATE: 71 BPM
P AXIS: 153 DEGREES
P OFFSET: 180 MS
P ONSET: 125 MS
PR INTERVAL: 186 MS
Q ONSET: 218 MS
QRS COUNT: 11 BEATS
QRS DURATION: 94 MS
QT INTERVAL: 420 MS
QTC CALCULATION(BAZETT): 456 MS
QTC FREDERICIA: 444 MS
R AXIS: 209 DEGREES
T AXIS: 159 DEGREES
T OFFSET: 428 MS
VENTRICULAR RATE: 71 BPM

## 2024-05-08 PROCEDURE — 99306 1ST NF CARE HIGH MDM 50: CPT | Performed by: FAMILY MEDICINE

## 2024-05-08 NOTE — DOCUMENTATION CLARIFICATION NOTE
"    PATIENT:               LUCIANO VASQUEZ  ACCT #:                  [id number]  MRN:                       [id number]  :                       1945  ADMIT DATE:       2024 5:12 PM  DISCH DATE:        2024 10:47 PM  RESPONDING PROVIDER #:        [id number]          PROVIDER RESPONSE TEXT:    both metabolic and toxic encephalopathy    CDI QUERY TEXT:    Clarification    Instruction:    Based on your assessment of the patient and the clinical information, please provide the requested documentation by clicking on the appropriate radio button and enter any additional information if prompted.    Question: Please further clarify the most likely etiology of the Altered Mental Status as    When answering this query, please exercise your independent professional judgment. The fact that a question is being asked, does not imply that any particular answer is desired or expected.    The patient's clinical indicators include:  Clinical Information:  78 yr old female presenting w/AMS, urinary incontinence from home.  Admitted for UTI and Tachycardia in presence of Anxiety and MDD, Recurrent Moderate.    Clinical Indicators:  CT Head  \"No acute intracranial abnormality.    Nursing flowsheet assessment from  to : GCS 14-15 with \"Confused\" x2 noted.    ED physician documentation 422 PM states \"According to her family, she lives alone and appears altered from her baseline..\"    HP by IM 0939 PM states \"Patient is on at least 3 sedating controlled substances including Xanax, tramadol, and Ambien.  Believe any 1 of these medications, let alone a combination of all 3 will predispose her to weakness, confusion, lethargy, and altered mental status.  These medications should be held indefinitely and less sedating/mood altering alternatives would be recommended.\"    Psychiatry consult note 0936 PM states \"Delirium\" and \"R/O medication induced psychosis possibly due to tramadol\".    IM note  1213 PM " "states \"Patient denies any hallucinations or vivid dreams last night however remained somewhat agitated and confused overnight\" and \"Will continue to hold Ultram.\"    Treatment: GCS monitoring.  Ammonia level.  CT Head.  Psychiatry consult.  Ultram held.    Risk Factors: Elderly female w/hx of Recurrent MDD, Anxiety and receiving three sedating controlled substances.  Options provided:  -- Toxic Encephalopathy 2/2 Tramadol  -- AMS due to other, Please specify further below  -- Other - I will add my own diagnosis  -- Refer to Clinical Documentation Reviewer    Query created by: Susan Collins on 5/7/2024 1:28 PM      Electronically signed by:  FARZANA FLORES DO 5/8/2024 3:57 PM          "

## 2024-05-08 NOTE — PROGRESS NOTES
Discharge Facility: [facility]  Discharge Diagnosis: Weakness  Admission Date: 5/2/24  Discharge Date: 5/7/24    PCP Appointment Date: no contact. Task to office  Specialist Appointment Date:  Hospital Encounter and Summary: Linked     Two attempts were made to reach patient within two business days after discharge. Voicemail left with contact information for patient to call back with any non-emergent questions or concerns.    Jose C Haynes LPN

## 2024-05-08 NOTE — PROGRESS NOTES
Documentation at [facility]    Problem List Items Addressed This Visit       Coronary artery disease - Primary    Frequent falls    Hyperlipidemia    Depression    Paroxysmal atrial fibrillation (Multi)    Long term (current) use of opiate analgesic    Generalized weakness     Other Visit Diagnoses       Mild protein-calorie malnutrition (Multi)        Acute cystitis without hematuria        Age-related osteoporosis without current pathological fracture        Hypertension, unspecified type              Spoke with sonYoav, from [place].  ER Physician.    Progressive weakness, lassitude.  Prefers to lie in bed at home.  Wants to die in her home.    Son will speak with PT/OT and SW

## 2024-05-08 NOTE — LETTER
Patient: Theodora Goel  : 1945    Encounter Date: 2024    Documentation at [facility]    Problem List Items Addressed This Visit       Coronary artery disease - Primary    Frequent falls    Hyperlipidemia    Depression    Paroxysmal atrial fibrillation (Multi)    Long term (current) use of opiate analgesic    Generalized weakness     Other Visit Diagnoses       Mild protein-calorie malnutrition (Multi)        Acute cystitis without hematuria        Age-related osteoporosis without current pathological fracture        Hypertension, unspecified type              Spoke with son, Yoav, from [place].  ER Physician.    Progressive weakness, lassitude.  Prefers to lie in bed at home.  Wants to die in her home.    Son will speak with PT/OT and SW      Electronically Signed By: Aaron Garrett DO   24  1:48 PM

## 2024-05-14 ENCOUNTER — APPOINTMENT (OUTPATIENT)
Dept: CARDIOLOGY | Facility: CLINIC | Age: 79
End: 2024-05-14
Payer: MEDICARE

## 2024-05-14 ENCOUNTER — TELEPHONE (OUTPATIENT)
Dept: PRIMARY CARE | Facility: CLINIC | Age: 79
End: 2024-05-14

## 2024-05-14 NOTE — TELEPHONE ENCOUNTER
Patient is being discharged from [facility] on Thursday and requesting homecare for PT/OT and skilled nursing.  Verbal order given for patient, RAFAEL

## 2024-05-15 ENCOUNTER — NURSING HOME VISIT (OUTPATIENT)
Dept: POST ACUTE CARE | Facility: EXTERNAL LOCATION | Age: 79
End: 2024-05-15
Payer: MEDICARE

## 2024-05-15 DIAGNOSIS — I48.0 PAROXYSMAL ATRIAL FIBRILLATION (MULTI): ICD-10-CM

## 2024-05-15 DIAGNOSIS — E03.9 HYPOTHYROIDISM, ADULT: ICD-10-CM

## 2024-05-15 DIAGNOSIS — F32.A DEPRESSION, UNSPECIFIED DEPRESSION TYPE: Primary | ICD-10-CM

## 2024-05-15 DIAGNOSIS — G89.29 CHRONIC MIDLINE LOW BACK PAIN WITH SCIATICA, SCIATICA LATERALITY UNSPECIFIED: ICD-10-CM

## 2024-05-15 DIAGNOSIS — M54.40 CHRONIC MIDLINE LOW BACK PAIN WITH SCIATICA, SCIATICA LATERALITY UNSPECIFIED: ICD-10-CM

## 2024-05-15 DIAGNOSIS — R53.1 GENERALIZED WEAKNESS: ICD-10-CM

## 2024-05-15 DIAGNOSIS — R29.6 FREQUENT FALLS: ICD-10-CM

## 2024-05-15 DIAGNOSIS — M54.16 LEFT LUMBAR RADICULOPATHY: ICD-10-CM

## 2024-05-15 PROCEDURE — 99316 NF DSCHRG MGMT 30 MIN+: CPT | Performed by: FAMILY MEDICINE

## 2024-05-15 NOTE — LETTER
Patient: Theodora Goel  : 1945    Encounter Date: 05/15/2024    Documentation at [facility]    Problem List Items Addressed This Visit       Frequent falls    Hypothyroidism, adult    Left lumbar radiculopathy    Low back pain    Depression - Primary    Paroxysmal atrial fibrillation (Multi)    Generalized weakness     Discharging 24 with [facility]  Spoke with sonLew.  Out of town physician  Spoke with housekeeping about son's concerns  Spoke with PT and RN about son's concerns  Spoke with son again.      Electronically Signed By: Aaron Garrett DO   24 11:29 PM

## 2024-05-16 ENCOUNTER — TELEPHONE (OUTPATIENT)
Dept: PRIMARY CARE | Facility: CLINIC | Age: 79
End: 2024-05-16
Payer: MEDICARE

## 2024-05-16 PROBLEM — G45.9 TIA (TRANSIENT ISCHEMIC ATTACK): Status: RESOLVED | Noted: 2023-11-14 | Resolved: 2024-05-16

## 2024-05-16 NOTE — TELEPHONE ENCOUNTER
Type of form: verbal order  Received from: simin via fax for completion and provider's signature   Fax to [phone]  Form placed in PCP box front office.

## 2024-05-17 ENCOUNTER — PATIENT OUTREACH (OUTPATIENT)
Dept: PRIMARY CARE | Facility: CLINIC | Age: 79
End: 2024-05-17
Payer: MEDICARE

## 2024-05-17 NOTE — PROGRESS NOTES
Documentation at [facility]    Problem List Items Addressed This Visit       Frequent falls    Hypothyroidism, adult    Left lumbar radiculopathy    Low back pain    Depression - Primary    Paroxysmal atrial fibrillation (Multi)    Generalized weakness     Discharging 5/16/24 with [facility]  Spoke with sonLew.  Out of town physician  Spoke with housekeeping about son's concerns  Spoke with PT and RN about son's concerns  Spoke with son again.

## 2024-05-17 NOTE — PROGRESS NOTES
Discharge Facility: [facility]  Discharge Diagnosis: Weakness  Admission Date: 5/7/24  Discharge Date: 5/16/24    PCP Appointment Date: None avail. Task to office  Specialist Appointment Date: 6/20/24 (Cardiology)  Hospital Encounter and Summary: Linked     See discharge assessment below for further details    Engagement  Call Start Time: 1023 (5/17/2024 10:26 AM)    Medications  Medications reviewed with patient/caregiver?: Yes (5/17/2024 10:26 AM)  Is the patient having any side effects they believe may be caused by any medication additions or changes?: No (5/17/2024 10:26 AM)  Does the patient have all medications ordered at discharge?: Yes (5/17/2024 10:26 AM)  Care Management Interventions: Provided patient education (5/17/2024 10:26 AM)  Prescription Comments: pt sent home with script (5/17/2024 10:26 AM)  Is the patient taking all medications as directed (includes completed medication regime)?: Yes (5/17/2024 10:26 AM)  Care Management Interventions: Provided patient education (5/17/2024 10:26 AM)  Medication Comments: Pt denies problems obtaining or affording medication (5/17/2024 10:26 AM)    Appointments  Does the patient have a primary care provider?: Yes (No avail apts. task to office) (5/17/2024 10:26 AM)  Has the patient kept scheduled appointments due by today?: Yes (5/17/2024 10:26 AM)    Self Management  What is the home health agency?: n/a (5/17/2024 10:26 AM)  Has home health visited the patient within 72 hours of discharge?: Not applicable (5/17/2024 10:26 AM)    Patient Teaching  Does the patient have access to their discharge instructions?: Yes (5/17/2024 10:26 AM)  Care Management Interventions: Reviewed instructions with patient (5/17/2024 10:26 AM)  What is the patient's perception of their health status since discharge?: Improving (5/17/2024 10:26 AM)  Is the patient/caregiver able to teach back the hierarchy of who to call/visit for symptoms/problems? PCP, Specialist,  Home Health nurse, Urgent Care, ED, 911: Yes (5/17/2024 10:26 AM)  Patient/Caregiver Education Comments: This CM spoke with pt via phone. Pt reports doing well at home since discharge. New meds reviewed. Pt denies CP and SOB. Pt aware of my availability for non-emergent concerns. Contact info provided to patient (5/17/2024 10:26 AM)      Jose C Haynes LPN

## 2024-05-29 ENCOUNTER — PATIENT OUTREACH (OUTPATIENT)
Dept: PRIMARY CARE | Facility: CLINIC | Age: 79
End: 2024-05-29
Payer: MEDICARE

## 2024-05-29 NOTE — PROGRESS NOTES
Unable to reach patient for call back after patient's follow up appointment with PCP.   LVM with call back number for patient to call if needed   If no voicemail available call attempts x 2 were made to contact the patient to assist with any questions or concerns patient may have.    Jose C Haynes LPN

## 2024-06-03 ENCOUNTER — TELEPHONE (OUTPATIENT)
Dept: PRIMARY CARE | Facility: CLINIC | Age: 79
End: 2024-06-03
Payer: MEDICARE

## 2024-06-03 LAB
ATRIAL RATE: 63 BPM
P AXIS: 51 DEGREES
P OFFSET: 173 MS
P ONSET: 106 MS
PR INTERVAL: 216 MS
Q ONSET: 214 MS
QRS COUNT: 10 BEATS
QRS DURATION: 94 MS
QT INTERVAL: 432 MS
QTC CALCULATION(BAZETT): 442 MS
QTC FREDERICIA: 439 MS
R AXIS: -32 DEGREES
T AXIS: 22 DEGREES
T OFFSET: 430 MS
VENTRICULAR RATE: 63 BPM

## 2024-06-03 NOTE — TELEPHONE ENCOUNTER
Bp over last few weeks has been increasing, patient has been monitoring at home systolic has been running 140's to 150's and diastolic mid to high 80's  162/86 still has bilateral lower extremity edema  No other symptoms

## 2024-06-20 ENCOUNTER — APPOINTMENT (OUTPATIENT)
Dept: CARDIOLOGY | Facility: CLINIC | Age: 79
End: 2024-06-20
Payer: MEDICARE

## 2024-06-20 VITALS
DIASTOLIC BLOOD PRESSURE: 82 MMHG | BODY MASS INDEX: 27.31 KG/M2 | WEIGHT: 160 LBS | HEART RATE: 63 BPM | HEIGHT: 64 IN | SYSTOLIC BLOOD PRESSURE: 138 MMHG

## 2024-06-20 DIAGNOSIS — I47.10 PAROXYSMAL SVT (SUPRAVENTRICULAR TACHYCARDIA) (CMS-HCC): ICD-10-CM

## 2024-06-20 DIAGNOSIS — R60.0 LOCALIZED EDEMA: ICD-10-CM

## 2024-06-20 DIAGNOSIS — E78.5 HYPERLIPIDEMIA, UNSPECIFIED HYPERLIPIDEMIA TYPE: ICD-10-CM

## 2024-06-20 DIAGNOSIS — I48.0 PAROXYSMAL ATRIAL FIBRILLATION (MULTI): ICD-10-CM

## 2024-06-20 DIAGNOSIS — I25.10 CORONARY ARTERY DISEASE INVOLVING NATIVE CORONARY ARTERY OF NATIVE HEART WITHOUT ANGINA PECTORIS: ICD-10-CM

## 2024-06-20 DIAGNOSIS — I10 PRIMARY HYPERTENSION: ICD-10-CM

## 2024-06-20 DIAGNOSIS — I47.19 OTHER SUPRAVENTRICULAR TACHYCARDIA (CMS-HCC): ICD-10-CM

## 2024-06-20 RX ORDER — CELECOXIB 200 MG/1
200 CAPSULE ORAL DAILY
Qty: 30 CAPSULE | Refills: 0 | Status: SHIPPED | OUTPATIENT
Start: 2024-06-20 | End: 2024-07-20

## 2024-06-20 RX ORDER — LOSARTAN POTASSIUM 50 MG/1
50 TABLET ORAL DAILY
COMMUNITY
Start: 2024-05-29

## 2024-06-20 RX ORDER — CELECOXIB 200 MG/1
200 CAPSULE ORAL 2 TIMES DAILY
COMMUNITY
Start: 2024-05-29 | End: 2024-06-20 | Stop reason: SDUPTHER

## 2024-06-20 RX ORDER — FLECAINIDE ACETATE 50 MG/1
50 TABLET ORAL EVERY 12 HOURS SCHEDULED
Qty: 180 TABLET | Refills: 1 | Status: SHIPPED | OUTPATIENT
Start: 2024-06-20

## 2024-06-20 RX ORDER — ROSUVASTATIN CALCIUM 20 MG/1
20 TABLET, COATED ORAL NIGHTLY
Qty: 90 TABLET | Refills: 1 | Status: SHIPPED | OUTPATIENT
Start: 2024-06-20

## 2024-06-20 NOTE — PROGRESS NOTES
Patient:  Theodora Goel  YOB: 1945  MRN: [id number]       Impression/Plan:     Diagnoses and all orders for this visit:  Primary hypertension  -    Controlled  Paroxysmal SVT (supraventricular tachycardia) (CMS-HCC)  -     Flecainide had controlled her SVT in the hospital I do not believe is contributing to her edema but rather I think that is the Celebrex.  I suggest that she resume the flecainide.  Would then get a 14-day event monitor.  -      She is to have follow-up with Dr. Liu of electrophysiology in 6 to 8 weeks.  Coronary artery disease involving native coronary artery of native heart without angina pectoris  -    No angina at her current edema is moderate I think it far more likely to be related to Celebrex which I like her to  Localized edema  -    Stop altogether at the very least reduce it to once a day unless arthritis is completely unmanageable  Paroxysmal atrial fibrillation (Multi)  -    No atrial fibrillation on 14-day monitor earlier in the year and none during the hospital stay issue apparently is is SVT.  Hyperlipidemia, unspecified hyperlipidemia type  -     rosuvastatin (Crestor) 20 mg tablet; Take 1 tablet (20 mg) by mouth once daily at bedtime.  Other supraventricular tachycardia (CMS-HCC)  -     Holter Or Event Cardiac Monitor; Future      Chief Complaint/Active Symptoms:       Theodora Goel is a 78 y.o. female who presents with  SVT, coronary disease based on calcium score 890, hyperlipidemia.  She had had a loop recorder placed for evaluation of SVT and prior atrial fibrillation and on a device check described March 23, 2020 had had episodes of atrial fibrillation.  1 episode lasting for over 3 hours.  Over the subsequent year however burden was felt to be quite low and anticoagulation was not instituted.     Echocardiogram 6/2/2022 normal study  March 2021 Lexiscan perfusion study normal        Loop recorder assessments 1/24/2023: 112-second episode of  tachycardia 1 to 2-second pauses.  One 6-second episode sinus rhythm 42 December 2022 20 episodes of tachycardia consistent with SVT 7-19 seconds in duration.    I had last seen her 11/14/2023 at which time she described dizziness palpitation unsteadiness SVT.  I was also concerned at that time she may have had a TIA and MRI was recommended as well as monitoring.  Laboratories ordered as well to assure no metabolic issues is present.  Echocardiogram was obtained to assess for LV function given her shortness of breath.      1/8/2024 brain MRI with and without contrast        No evidence of acute infarct, intracranial mass effect or midline  shift.      Mild volume loss. Mild-to-moderate degree of nonspecific white matter  changes compatible with microangiopathy. No abnormal parenchymal enhancement.       1/18/2024 echocardiogram   1. Left ventricular systolic function is normal with a 65% estimated ejection fraction.   2. Spectral Doppler shows an impaired relaxation pattern of left ventricular diastolic filling.   3. There is moderate concentric left ventricular hypertrophy.   4. Normal chamber sizes.   5. No significant valvular heart disease.    1/18/2414 day event monitor  Impression  1.  Patient triggered events correlated to sinus bradycardia 54-55 bpm.  2.  1 episode of what appears to be accelerated junctional rhythm initially with very short LA interval followed by a PAC and then junctional rate at 75-80 bpm.  3.  Asymptomatic PVCs identified  4.  No episodes of heart block 5 tracings for review all consistent with sinus bradycardia between 54 and 62 the junctional rhythm was at a rate of 91.    Hospitalized [facility] 5/2/2024 through 5/7/2024 after presenting with weakness UTI and tachycardia.  He was described as having increased falling and some confusion.  He had had a shorter hospitalization prior to that with similar symptoms felt related to multiple sedating medicines such as  tramadol Xanax Ambien apparently she had still been taking them as though was recommended she discontinue them.  Was found at that time to have urinary tract infection.  She was seen by psychiatry who continued Remeron suggested Abilify.  Xanax and Ambien were discontinued.    She was seen by electrophysiology during that hospital stay and begun on flecainide 50 twice daily metoprolol 25 twice daily.  This is because episodes of paroxysmal SVT probably atrial tachycardia noted on twelve-lead EKG 5/3/2024.  She was seen by Dr. Deleon as well which suggested the possibility of loop recorder versus consideration of ablative approach    5/13/2024 liver function study normal BUN 2032 creatinine 0.77 sodium 147 potassium 3.8 hemoglobin 10.5 platelet count 170    January 2024 cholesterol 159 HDL 92 LDL 49    She has noted her heart racing several times a day perhaps as many as 10 times on occasion.  No associated dizziness but she is tired.  She has not taken any valsartan since April now is on losartan tolerating it well.  She had continued her metoprolol but stopped the flecainide because she thought it was causing swelling she still has some swelling.  She has not had chest pain.  She does take Celebrex twice a day for arthritic complaints.  She currently has no symptoms to suggest recurrent UTI.      Review of Systems: Unremarkable except as noted above    Meds     Current Outpatient Medications   Medication Instructions    albuterol 90 mcg/actuation inhaler 2 puffs, inhalation, Every 4 hours PRN    alendronate (FOSAMAX) 70 mg, oral, Every 7 days, Take in the morning with a full glass of water, on an empty stomach, and do not take anything else by mouth or lie down for the next 30 min.    ARIPiprazole (ABILIFY) 5 mg, oral, Daily    aspirin 81 mg, oral, Daily    celecoxib (CELEBREX) 200 mg, oral, Daily    flecainide (TAMBOCOR) 50 mg, oral, Every 12 hours scheduled    levothyroxine (SYNTHROID, LEVOXYL) 125 mcg, oral,  Daily    losartan (COZAAR) 50 mg, oral, Daily    metoprolol tartrate (LOPRESSOR) 25 mg, oral, 2 times daily    mirtazapine (REMERON) 7.5 mg, oral, Nightly    rosuvastatin (CRESTOR) 20 mg, oral, Nightly        Allergies   No Known Allergies      Annotated Problems     Specialty Problems          Cardiology Problems    Abnormal ECG    Coronary artery disease     Echocardiogram 6/2/2022 normal study  March 2021 Lexiscan perfusion study normal       coronary disease based on calcium score 890 CT 2020         VARGAS (dyspnea on exertion)    Hyperlipidemia    Paroxysmal atrial fibrillation (Multi)     Loop recorder assessments   -1/24/2023: 112-second episode of tachycardia 1 to     2-second pauses.  One 6-second episode sinus rhythm 42 -December 2022 20 episodes of tachycardia consistent with SVT 7-19 seconds in duration.    -March 2020 several episodes atrial fibrillation one 3 hours in duration         Raynaud's phenomenon    Paroxysmal SVT (supraventricular tachycardia) (CMS-HCC)    Primary hypertension        Problem List     Patient Active Problem List    Diagnosis Date Noted    Primary hypertension 06/20/2024    Localized edema 06/20/2024    Generalized weakness 05/05/2024    Urinary incontinence 05/02/2024    Dizziness 11/14/2023    Unsteady 11/14/2023    Paroxysmal SVT (supraventricular tachycardia) (CMS-HCC) 11/14/2023    Long term (current) use of opiate analgesic 06/16/2023    Abnormal CBC 02/03/2023    Abnormal ECG 02/03/2023    Anxiety 02/03/2023    Asthma ([facility]) 02/03/2023    Atypical chest pain 02/03/2023    Atypical mole 02/03/2023    Balance problem 02/03/2023    Coronary artery disease 02/03/2023    Weakness 02/03/2023    Eczema 02/03/2023    Seborrheic dermatitis 02/03/2023    Frequent falls 02/03/2023    Gallstones 02/03/2023    GERD (gastroesophageal reflux disease) 02/03/2023    Hyperlipidemia 02/03/2023    Hypomagnesemia 02/03/2023    Hypothyroidism, adult 02/03/2023    Insomnia 02/03/2023     "Left lumbar radiculopathy 02/03/2023    Low back pain 02/03/2023    Magnesium deficiency 02/03/2023    Depression 02/03/2023    Paroxysmal atrial fibrillation (Multi) 02/03/2023    Postural dizziness 02/03/2023    Primary osteoarthritis of knees, bilateral 02/03/2023    Raynaud's phenomenon 02/03/2023    VARGAS (dyspnea on exertion) 02/03/2023    Shortness of breath 02/03/2023    Spine degeneration 02/03/2023    Tinnitus 02/03/2023    Vitamin B12 deficiency 02/03/2023    Vitamin D deficiency 02/03/2023    Weight loss 02/03/2023       Objective:     Vitals:    06/20/24 1456   BP: 138/82   BP Location: Right arm   Patient Position: Sitting   Pulse: 63   Weight: 72.6 kg (160 lb)   Height: 1.626 m (5' 4\")      Wt Readings from Last 4 Encounters:   06/20/24 72.6 kg (160 lb)   05/02/24 72.8 kg (160 lb 7.9 oz)   12/04/23 70.9 kg (156 lb 6.4 oz)   01/08/24 70.8 kg (156 lb)           LAB:     Lab Results   Component Value Date    WBC 5.0 05/07/2024    HGB 11.0 (L) 05/07/2024    HCT 35.4 (L) 05/07/2024     05/07/2024    CHOL 159 01/18/2024    TRIG 88 01/18/2024    HDL 92.2 01/18/2024    ALT 17 05/03/2024    AST 21 05/03/2024     05/07/2024    K 3.7 05/07/2024     05/07/2024    CREATININE 0.74 05/07/2024    BUN 39 (H) 05/07/2024    CO2 27 05/07/2024    TSH 5.42 (H) 05/02/2024    INR 1.0 05/02/2024    HGBA1C 5.1 01/18/2024       Diagnostic Studies:     ECG 12 Lead    Result Date: 6/3/2024  Sinus rhythm with 1st degree AV block Left axis deviation Abnormal ECG When compared with ECG of 26-JUL-2013 10:06, OK interval has increased Confirmed by Parth Putnam ([id number]) on 6/3/2024 5:40:34 PM    Electrocardiogram, 12-lead PRN ACS symptoms    Result Date: 5/4/2024  Supraventricular tachycardia Left axis deviation Late transition Abnormal ECG When compared with ECG of 02-MAY-2024 19:58, (unconfirmed) OK interval has decreased Vent. rate has increased BY  91 BPM Confirmed by Rubin Deleon ([id number]) on 5/4/2024 3:18:11 " PM    XR chest 1 view    Result Date: 5/2/2024  STUDY: Chest Radiograph;  05/02/2024, 6:55 PM. INDICATION: Altered mental status. COMPARISON: CXR: 01/08/24, 06/24/22, 01/28/22. ACCESSION NUMBER(S): [id number] ORDERING CLINICIAN: WILLIAN MEAD TECHNIQUE:  Frontal chest was obtained at 18:55 hours. FINDINGS: CARDIOMEDIASTINAL SILHOUETTE: Cardiomediastinal silhouette is mildly enlarged but without significant change considering differences in technique and depth of inspiration lower on the current study.  Heart size likely upper limits of recorder device projecting over the left side of the heart.  LUNGS: Relatively low depth of inspiration without focal consolidation or pulmonary edema.  No pleural effusions or pneumothorax.  ABDOMEN: No remarkable upper abdominal findings.  BONES: No acute osseous changes.  Degenerative changes of the glenohumeral joints, and associated ossified loose bodies.  Mild thoracic scoliosis.    No acute pulmonary process. Signed by Shyam Cruz,     CT head wo IV contrast    Result Date: 5/2/2024  Interpreted By:  Edward Subramanian, STUDY: CT HEAD WO IV CONTRAST;  5/2/2024 7:02 pm   INDICATION: Signs/Symptoms:frequent falls generalized weakness.   COMPARISON: None.   ACCESSION NUMBER(S): [id number]   ORDERING CLINICIAN: ALEJANDRA CROSS   TECHNIQUE: Noncontrast axial CT images of head were obtained with coronal and sagittal reconstructed images.   FINDINGS: BRAIN PARENCHYMA:  No acute intraparenchymal hemorrhage or parenchymal evidence of acute large territory ischemic infarct. No mass-effect. Gray-white matter distinction is preserved.   VENTRICLES and EXTRA-AXIAL SPACES:  No acute extra-axial or intraventricular hemorrhage. No effacement of cerebral sulci. Ventricles and sulci are age-concordant.   PARANASAL SINUSES/MASTOIDS:  No hemorrhage or air-fluid levels within the visualized paranasal sinuses. The mastoids are well aerated.   CALVARIUM/ORBITS:  No skull fracture.  The orbits and  globes are intact to the extent visualized.   EXTRACRANIAL SOFT TISSUES: No discernible abnormality.       1. No acute intracranial abnormality.         MACRO: None.   Signed by: Edward Subramanian 5/2/2024 7:25 PM Dictation workstation:   [id number]        Radiology:     Holter Or Event Cardiac Monitor    (Results Pending)       Physical Exam     General Appearance: alert and oriented to person, place and time, in no acute distress  Cardiovascular: normal rate, regular rhythm, normal S1 and S2, no murmurs, rubs, clicks, or gallops,  no JVD  Pulmonary/Chest: clear to auscultation bilaterally- no wheezes, rales or rhonchi, normal air movement, no respiratory distress  Abdomen: soft, non-tender, non-distended, normal bowel sounds, no masses   Extremities: no cyanosis, clubbing. 1-2+ edema  Skin: warm and dry, no rash or erythema  Eyes: EOMI  Neck: supple and non-tender without mass, no thyromegaly   Neurological: alert, oriented, normal speech, no focal findings or movement disorder noted

## 2024-06-20 NOTE — PATIENT INSTRUCTIONS
PLEASE BRING ALL MEDICATION BOTTLES TO OFFICE VISITS.     DECREASE: CELEBREX 200 MG- TAKE 1 DAILY

## 2024-06-21 ENCOUNTER — PATIENT OUTREACH (OUTPATIENT)
Dept: PRIMARY CARE | Facility: CLINIC | Age: 79
End: 2024-06-21
Payer: MEDICARE

## 2024-06-21 NOTE — PROGRESS NOTES
Unable to reach patient for discharge follow up call.   LVM with call back number for patient to call if needed   If no voicemail available call attempts x 2 were made to contact the patient to assist with any questions or concerns patient may have.    Jose C Haynes LPN

## 2024-06-26 ENCOUNTER — APPOINTMENT (OUTPATIENT)
Dept: PRIMARY CARE | Facility: CLINIC | Age: 79
End: 2024-06-26
Payer: MEDICARE

## 2024-07-01 ENCOUNTER — APPOINTMENT (OUTPATIENT)
Dept: CARDIOLOGY | Facility: CLINIC | Age: 79
End: 2024-07-01
Payer: MEDICARE

## 2024-07-10 ENCOUNTER — TELEPHONE (OUTPATIENT)
Dept: CARDIOLOGY | Facility: CLINIC | Age: 79
End: 2024-07-10
Payer: MEDICARE

## 2024-07-10 NOTE — TELEPHONE ENCOUNTER
14 day Jared [id number] no prior auth is required per the [employer] Portal. Transaction/Customer ID: [id number].

## 2024-07-15 ENCOUNTER — APPOINTMENT (OUTPATIENT)
Dept: CARDIOLOGY | Facility: CLINIC | Age: 79
End: 2024-07-15
Payer: MEDICARE

## 2024-07-23 ENCOUNTER — APPOINTMENT (OUTPATIENT)
Dept: PRIMARY CARE | Facility: CLINIC | Age: 79
End: 2024-07-23
Payer: MEDICARE

## 2024-07-31 ENCOUNTER — APPOINTMENT (OUTPATIENT)
Dept: CARDIOLOGY | Facility: CLINIC | Age: 79
End: 2024-07-31
Payer: MEDICARE

## 2024-08-02 ENCOUNTER — APPOINTMENT (OUTPATIENT)
Dept: PRIMARY CARE | Facility: CLINIC | Age: 79
End: 2024-08-02

## 2024-08-07 ENCOUNTER — APPOINTMENT (OUTPATIENT)
Dept: PRIMARY CARE | Facility: CLINIC | Age: 79
End: 2024-08-07
Payer: MEDICARE

## 2024-08-07 VITALS
SYSTOLIC BLOOD PRESSURE: 146 MMHG | DIASTOLIC BLOOD PRESSURE: 75 MMHG | OXYGEN SATURATION: 94 % | BODY MASS INDEX: 28.82 KG/M2 | WEIGHT: 168.8 LBS | HEIGHT: 64 IN | HEART RATE: 55 BPM

## 2024-08-07 DIAGNOSIS — E55.9 VITAMIN D DEFICIENCY: ICD-10-CM

## 2024-08-07 DIAGNOSIS — R60.0 BILATERAL EDEMA OF LOWER EXTREMITY: ICD-10-CM

## 2024-08-07 DIAGNOSIS — L30.9 ECZEMA, UNSPECIFIED TYPE: ICD-10-CM

## 2024-08-07 DIAGNOSIS — R26.0 ATAXIC GAIT: ICD-10-CM

## 2024-08-07 DIAGNOSIS — R73.9 HYPERGLYCEMIA: ICD-10-CM

## 2024-08-07 DIAGNOSIS — Z91.89 AT RISK FOR POLYPHARMACY: ICD-10-CM

## 2024-08-07 DIAGNOSIS — J44.9 CHRONIC OBSTRUCTIVE PULMONARY DISEASE, UNSPECIFIED COPD TYPE (MULTI): ICD-10-CM

## 2024-08-07 DIAGNOSIS — M25.50 POLYARTHRALGIA: ICD-10-CM

## 2024-08-07 DIAGNOSIS — R54 FRAILTY SYNDROME IN GERIATRIC PATIENT: ICD-10-CM

## 2024-08-07 DIAGNOSIS — E78.00 PURE HYPERCHOLESTEROLEMIA: ICD-10-CM

## 2024-08-07 DIAGNOSIS — R53.83 FATIGUE, UNSPECIFIED TYPE: Primary | ICD-10-CM

## 2024-08-07 DIAGNOSIS — I47.10 PAROXYSMAL SVT (SUPRAVENTRICULAR TACHYCARDIA) (CMS-HCC): ICD-10-CM

## 2024-08-07 DIAGNOSIS — Z87.440 HISTORY OF UTI: ICD-10-CM

## 2024-08-07 DIAGNOSIS — H90.3 SENSORINEURAL HEARING LOSS (SNHL) OF BOTH EARS: ICD-10-CM

## 2024-08-07 DIAGNOSIS — D51.9 ANEMIA DUE TO VITAMIN B12 DEFICIENCY, UNSPECIFIED B12 DEFICIENCY TYPE: ICD-10-CM

## 2024-08-07 DIAGNOSIS — E83.42 HYPOMAGNESEMIA: ICD-10-CM

## 2024-08-07 DIAGNOSIS — R29.6 FREQUENT FALLS: ICD-10-CM

## 2024-08-07 DIAGNOSIS — I10 ESSENTIAL HYPERTENSION: ICD-10-CM

## 2024-08-07 RX ORDER — KETOCONAZOLE 20 MG/ML
SHAMPOO, SUSPENSION TOPICAL 2 TIMES WEEKLY
COMMUNITY

## 2024-08-07 RX ORDER — CYANOCOBALAMIN 1000 UG/ML
1000 INJECTION, SOLUTION INTRAMUSCULAR; SUBCUTANEOUS ONCE
Status: COMPLETED | OUTPATIENT
Start: 2024-08-07 | End: 2024-08-07

## 2024-08-07 RX ORDER — LANOLIN ALCOHOL/MO/W.PET/CERES
CREAM (GRAM) TOPICAL
Qty: 180 TABLET | Refills: 3 | Status: SHIPPED | OUTPATIENT
Start: 2024-08-07

## 2024-08-07 RX ORDER — TALC
POWDER (GRAM) TOPICAL
Qty: 180 TABLET | Refills: 0 | Status: SHIPPED | OUTPATIENT
Start: 2024-08-07

## 2024-08-07 RX ORDER — MULTIVITAMIN
TABLET ORAL
COMMUNITY

## 2024-08-07 RX ORDER — CHOLECALCIFEROL (VITAMIN D3) 1250 MCG
TABLET ORAL
Qty: 13 TABLET | Refills: 1 | Status: SHIPPED | OUTPATIENT
Start: 2024-08-11

## 2024-08-07 RX ORDER — TRIAMCINOLONE ACETONIDE 1 MG/G
OINTMENT TOPICAL 2 TIMES DAILY PRN
Qty: 60 G | Refills: 0 | Status: SHIPPED | OUTPATIENT
Start: 2024-08-07 | End: 2024-12-05

## 2024-08-07 NOTE — PROGRESS NOTES
"Subjective   Patient ID: Theodora Goel is a 78 y.o. female who presents for [facility] (Patient presented today to [facility].).    HPI     Review of Systems    Objective   /75 (BP Location: Right arm, Patient Position: Sitting, BP Cuff Size: Adult)   Pulse 55   Ht 1.626 m (5' 4\")   Wt 76.6 kg (168 lb 12.8 oz)   SpO2 94%   BMI 28.97 kg/m²     Physical Exam    Assessment/Plan   Diagnoses and all orders for this visit:  Fatigue, unspecified type  -     cyanocobalamin (Vitamin B-12) injection 1,000 mcg  -     cyanocobalamin (Vitamin B-12) 1,000 mcg tablet; Please take 1 tablet by mouth with breakfast, and again 1 tablet by mouth with lunch.  Do not take with supper, may cause INSOMNIA.  Thank you.  -     Follow Up In Primary Care - [facility]; Future  Bilateral edema of lower extremity  -     Follow Up In Primary Care [facility]; Future  Frequent falls  -     Follow Up In Primary Care [facility]; Future  Frailty syndrome in geriatric patient  -     Follow Up In Primary Care - Established; Future  Sensorineural hearing loss (SNHL) of both ears  -     Referral to Audiology; Future  -     Follow Up In Primary Care - [facility]; Future  At risk for polypharmacy  -     Follow Up In Primary Care - [facility]; Future  Ataxic gait  -     Follow Up In Primary Care - Established; Future  Polyarthralgia  -     Follow Up In Primary Care - Established; Future  Essential hypertension  -     magnesium oxide (Mag-Ox) 250 mg magnesium tablet; Please take 1 tablet by mouth with food twice a day.  Always with food.  Watch out for diarrhea, abdominal distress.  Thank you.  -     Follow Up In Primary Care - [facility]; Future  Pure hypercholesterolemia  -     Follow Up In Primary Care - [facility]; Future  Hyperglycemia  -     Follow Up In Primary Care [facility]; Future  Paroxysmal SVT (supraventricular tachycardia) (CMS-HCC)  -     magnesium oxide (Mag-Ox) 250 mg magnesium tablet; Please take 1 tablet by " mouth with food twice a day.  Always with food.  Watch out for diarrhea, abdominal distress.  Thank you.  -     Follow Up In Primary Care - Established; Future  Chronic obstructive pulmonary disease, unspecified COPD type (Multi)  -     Follow Up In Primary Care - Established; Future  Anemia due to vitamin B12 deficiency, unspecified B12 deficiency type  -     cyanocobalamin (Vitamin B-12) injection 1,000 mcg  -     cyanocobalamin (Vitamin B-12) 1,000 mcg tablet; Please take 1 tablet by mouth with breakfast, and again 1 tablet by mouth with lunch.  Do not take with supper, may cause INSOMNIA.  Thank you.  -     Follow Up In Primary Care - Established; Future  Hypomagnesemia  -     magnesium oxide (Mag-Ox) 250 mg magnesium tablet; Please take 1 tablet by mouth with food twice a day.  Always with food.  Watch out for diarrhea, abdominal distress.  Thank you.  -     Follow Up In Primary Care - Established; Future  Vitamin D deficiency  -     cholecalciferol (Vitamin D3) 1,250 mcg (50,000 unit) tablet; Please take 1 tablet by mouth Sundays, with lunch and a full glass of water.  Thank you. Do not fill before August 11, 2024.  -     Follow Up In Primary Care - Established; Future  History of UTI  -     Follow Up In Primary Care - Established; Future  Eczema, unspecified type  -     triamcinolone (Kenalog) 0.1 % ointment; Apply topically 2 times a day as needed for irritation or rash.  -     Follow Up In Primary Care - Established; Future       Thank you very much for coming.  It is a pleasure to meet you and your son, Yoav!  It was also very helpful that your son, Lew, could join us!    I do understand your concern regarding tiredness.  Also, I do understand that you have had frequent falls, and has had a history of forgetfulness.  Currently, I am glad to hear that you are back home, and that you are using [employer], and that you are able to drive and do personal errands.    In the meantime, I see that they have  already started some workup in regards to these issues.    It would be very helpful if you could have your HEARING evaluated.  Strengthening your hearing will also help strengthen and preserve your MEMORY!    I had a chance to review your chart briefly, and I do see that you have not had VITAMIN B 12 deficiency.  This can be a cause of fatigue and forgetfulness.  VITAMIN B12 injection now, followed by VITAMIN B12 by the mouth.  You can take this with breakfast, and again with lunch, but do not take vitamin B12 with supper, because it can contribute to difficulty with sleeping.    You also have had LOW MAGNESIUM in the past.  Magnesium does not directly affect your memory, but it does cause tiredness, and is very important in muscle function, especially heart muscle!  Let us get you started with MAGNESIUM 250 mg.  You can take this with lunch every day.  Take once a day only for now, and always with food.  This way, we can tell if you can tolerate magnesium.  It can cause DIARRHEA, and so we will take it once a day until you are used to it.    You have also had LOW VITAMIN D in the past.  I will write a prescription for your VITAMIN D to be taken once a week.  I tell people to take it once a week, Sundays, with lunch, and a full glass of water.  That way, it will be easy to remember!    Sometimes, an infection in your URINE can cause confusion and forgetfulness.  I am glad to hear that you have not had any urine symptoms, but we will check your urine routinely.    There is more to discuss, but let us start with these.    Please continue taking your MULTIVITAMIN.  Please continue taking your THYROID regimen, LEVOTHYROXINE.    Likewise, please continue taking your heart medications, and please continue following with Dr. Martins, your CARDIOLOGIST.    Lew also pointed out that you have been having some swelling affecting your legs.  Some of your workup has already been done, and the results have been normal.  Do not  worry, I will continue to look.    At the very least, please avoid excessive salt.  Elevate your legs whenever possible.  Get yourself some support socks to help contain your swelling.  Thank you for using sensible shoes with rubber soles!    Again, thank you very much for coming.  There is more to discuss, but let us repeat FASTING laboratory examinations in 2 weeks, then let us see each other in about 3 weeks.  Until then, please continue to take care of yourself and each other, and please continue to pray for our recovery from this pandemic.  Take care and God bless us!    You do have a MILD ECZEMA along your hairline and behind your right ear.  A thin layer of TRIAMCINOLONE may help.  If it is not burning or itching, do not worry much about it.  I will reevaluate it when you return next visit.            0  Return in 3 weeks.  40 minutes please.  Reassess debility, history of cognitive impairment, mood, energy, function.  Coordinate with cardiology, review workup for edema.  Coordinate with home care.  Prioritize issues.  Review preventive strategies.            0

## 2024-08-07 NOTE — PATIENT INSTRUCTIONS
Thank you very much for coming.  It is a pleasure to meet you and your son, Yoav!  It was also very helpful that your son, Lew, could join us!    I do understand your concern regarding tiredness.  Also, I do understand that you have had frequent falls, and has had a history of forgetfulness.  Currently, I am glad to hear that you are back home, and that you are using [employer], and that you are able to drive and do personal errands.    In the meantime, I see that they have already started some workup in regards to these issues.    It would be very helpful if you could have your HEARING evaluated.  Strengthening your hearing will also help strengthen and preserve your MEMORY!    I had a chance to review your chart briefly, and I do see that you have not had VITAMIN B 12 deficiency.  This can be a cause of fatigue and forgetfulness.  VITAMIN B12 injection now, followed by VITAMIN B12 by the mouth.  You can take this with breakfast, and again with lunch, but do not take vitamin B12 with supper, because it can contribute to difficulty with sleeping.    You also have had LOW MAGNESIUM in the past.  Magnesium does not directly affect your memory, but it does cause tiredness, and is very important in muscle function, especially heart muscle!  Let us get you started with MAGNESIUM 250 mg.  You can take this with lunch every day.  Take once a day only for now, and always with food.  This way, we can tell if you can tolerate magnesium.  It can cause DIARRHEA, and so we will take it once a day until you are used to it.    You have also had LOW VITAMIN D in the past.  I will write a prescription for your VITAMIN D to be taken once a week.  I tell people to take it once a week, Sundays, with lunch, and a full glass of water.  That way, it will be easy to remember!    Sometimes, an infection in your URINE can cause confusion and forgetfulness.  I am glad to hear that you have not had any urine symptoms, but we will check  your urine routinely.    There is more to discuss, but let us start with these.    Please continue taking your MULTIVITAMIN.  Please continue taking your THYROID regimen, LEVOTHYROXINE.    Likewise, please continue taking your heart medications, and please continue following with Dr. Martins, your CARDIOLOGIST.    Lew also pointed out that you have been having some swelling affecting your legs.  Some of your workup has already been done, and the results have been normal.  Do not worry, I will continue to look.    At the very least, please avoid excessive salt.  Elevate your legs whenever possible.  Get yourself some support socks to help contain your swelling.  Thank you for using sensible shoes with rubber soles!    Again, thank you very much for coming.  There is more to discuss, but let us repeat FASTING laboratory examinations in 2 weeks, then let us see each other in about 3 weeks.  Until then, please continue to take care of yourself and each other, and please continue to pray for our recovery from this pandemic.  Take care and God bless us!    You do have a MILD ECZEMA along your hairline and behind your right ear.  A thin layer of TRIAMCINOLONE may help.  If it is not burning or itching, do not worry much about it.  I will reevaluate it when you return next visit.            0  Return in 3 weeks.  40 minutes please.  Reassess debility, history of cognitive impairment, mood, energy, function.  Coordinate with cardiology, review workup for edema.  Coordinate with home care.  Prioritize issues.  Review preventive strategies.            0

## 2024-08-13 ENCOUNTER — PATIENT OUTREACH (OUTPATIENT)
Dept: PRIMARY CARE | Facility: CLINIC | Age: 79
End: 2024-08-13
Payer: MEDICARE

## 2024-08-13 NOTE — PROGRESS NOTES
Patient has met target of no readmission for (90) days post (hospital, SNF, rehab) discharge and is graduated from Transitional Care Management program at this time.    Jose C Haynes LPN

## 2024-08-19 ENCOUNTER — APPOINTMENT (OUTPATIENT)
Dept: CARDIOLOGY | Facility: CLINIC | Age: 79
End: 2024-08-19
Payer: MEDICARE

## 2024-08-26 ENCOUNTER — APPOINTMENT (OUTPATIENT)
Dept: CARDIOLOGY | Facility: CLINIC | Age: 79
End: 2024-08-26
Payer: MEDICARE

## 2024-08-28 ENCOUNTER — TELEPHONE (OUTPATIENT)
Dept: PRIMARY CARE | Facility: CLINIC | Age: 79
End: 2024-08-28
Payer: MEDICARE

## 2024-08-28 DIAGNOSIS — E83.42 HYPOMAGNESEMIA: ICD-10-CM

## 2024-08-28 DIAGNOSIS — F41.9 ANXIETY: ICD-10-CM

## 2024-08-28 DIAGNOSIS — I47.10 PAROXYSMAL SVT (SUPRAVENTRICULAR TACHYCARDIA) (CMS-HCC): ICD-10-CM

## 2024-08-28 DIAGNOSIS — E03.9 HYPOTHYROIDISM, ADULT: ICD-10-CM

## 2024-08-28 DIAGNOSIS — I10 ESSENTIAL HYPERTENSION: ICD-10-CM

## 2024-08-29 RX ORDER — LOSARTAN POTASSIUM 50 MG/1
50 TABLET ORAL DAILY
Status: CANCELLED | OUTPATIENT
Start: 2024-08-29

## 2024-08-29 RX ORDER — LEVOTHYROXINE SODIUM 125 UG/1
125 TABLET ORAL DAILY
Qty: 90 TABLET | Refills: 3 | Status: CANCELLED | OUTPATIENT
Start: 2024-08-29 | End: 2024-11-27

## 2024-08-29 RX ORDER — METOPROLOL TARTRATE 25 MG/1
25 TABLET, FILM COATED ORAL 2 TIMES DAILY
Qty: 60 TABLET | Refills: 0 | Status: CANCELLED | OUTPATIENT
Start: 2024-08-29 | End: 2024-09-28

## 2024-08-29 RX ORDER — MIRTAZAPINE 7.5 MG/1
7.5 TABLET, FILM COATED ORAL NIGHTLY
Qty: 14 TABLET | Refills: 0 | Status: CANCELLED | OUTPATIENT
Start: 2024-08-29 | End: 2024-09-12

## 2024-08-30 ENCOUNTER — APPOINTMENT (OUTPATIENT)
Dept: PRIMARY CARE | Facility: CLINIC | Age: 79
End: 2024-08-30
Payer: MEDICARE

## 2024-08-30 ENCOUNTER — LAB (OUTPATIENT)
Dept: LAB | Facility: LAB | Age: 79
End: 2024-08-30
Payer: MEDICARE

## 2024-08-30 DIAGNOSIS — D51.9 ANEMIA DUE TO VITAMIN B12 DEFICIENCY, UNSPECIFIED B12 DEFICIENCY TYPE: ICD-10-CM

## 2024-08-30 DIAGNOSIS — I10 ESSENTIAL HYPERTENSION: Primary | ICD-10-CM

## 2024-08-30 DIAGNOSIS — E03.4 HYPOTHYROIDISM DUE TO ACQUIRED ATROPHY OF THYROID: ICD-10-CM

## 2024-08-30 DIAGNOSIS — M25.50 POLYARTHRALGIA: ICD-10-CM

## 2024-08-30 DIAGNOSIS — E55.9 VITAMIN D DEFICIENCY: ICD-10-CM

## 2024-08-30 DIAGNOSIS — I10 ESSENTIAL HYPERTENSION: ICD-10-CM

## 2024-08-30 DIAGNOSIS — B96.20 E. COLI UTI: ICD-10-CM

## 2024-08-30 DIAGNOSIS — R73.9 HYPERGLYCEMIA: ICD-10-CM

## 2024-08-30 DIAGNOSIS — E78.2 MIXED HYPERLIPIDEMIA: ICD-10-CM

## 2024-08-30 DIAGNOSIS — R54 FRAILTY SYNDROME IN GERIATRIC PATIENT: ICD-10-CM

## 2024-08-30 DIAGNOSIS — N39.0 E. COLI UTI: ICD-10-CM

## 2024-08-30 LAB
ALBUMIN SERPL BCP-MCNC: 4.5 G/DL (ref 3.4–5)
ALP SERPL-CCNC: 50 U/L (ref 33–136)
ALT SERPL W P-5'-P-CCNC: 11 U/L (ref 7–45)
ANION GAP SERPL CALC-SCNC: 12 MMOL/L (ref 10–20)
APPEARANCE UR: CLEAR
AST SERPL W P-5'-P-CCNC: 17 U/L (ref 9–39)
BASOPHILS # BLD AUTO: 0.02 X10*3/UL (ref 0–0.1)
BASOPHILS NFR BLD AUTO: 0.4 %
BILIRUB SERPL-MCNC: 0.5 MG/DL (ref 0–1.2)
BILIRUB UR STRIP.AUTO-MCNC: NEGATIVE MG/DL
BUN SERPL-MCNC: 25 MG/DL (ref 6–23)
CALCIUM SERPL-MCNC: 9.8 MG/DL (ref 8.6–10.3)
CHLORIDE SERPL-SCNC: 105 MMOL/L (ref 98–107)
CHOLEST SERPL-MCNC: 153 MG/DL (ref 0–199)
CHOLESTEROL/HDL RATIO: 1.7
CK SERPL-CCNC: 36 U/L (ref 0–215)
CO2 SERPL-SCNC: 30 MMOL/L (ref 21–32)
COLOR UR: NORMAL
CREAT SERPL-MCNC: 0.7 MG/DL (ref 0.5–1.05)
EGFRCR SERPLBLD CKD-EPI 2021: 89 ML/MIN/1.73M*2
EOSINOPHIL # BLD AUTO: 0.09 X10*3/UL (ref 0–0.4)
EOSINOPHIL NFR BLD AUTO: 1.8 %
ERYTHROCYTE [DISTWIDTH] IN BLOOD BY AUTOMATED COUNT: 13.3 % (ref 11.5–14.5)
EST. AVERAGE GLUCOSE BLD GHB EST-MCNC: 105 MG/DL
FERRITIN SERPL-MCNC: 77 NG/ML (ref 8–150)
FOLATE SERPL-MCNC: >22.3 NG/ML
GLUCOSE SERPL-MCNC: 90 MG/DL (ref 74–99)
GLUCOSE UR STRIP.AUTO-MCNC: NORMAL MG/DL
HBA1C MFR BLD: 5.3 %
HCT VFR BLD AUTO: 39.1 % (ref 36–46)
HDLC SERPL-MCNC: 90.2 MG/DL
HGB BLD-MCNC: 12.2 G/DL (ref 12–16)
HOLD SPECIMEN: NORMAL
IMM GRANULOCYTES # BLD AUTO: 0.01 X10*3/UL (ref 0–0.5)
IMM GRANULOCYTES NFR BLD AUTO: 0.2 % (ref 0–0.9)
IRON SATN MFR SERPL: 25 % (ref 25–45)
IRON SERPL-MCNC: 91 UG/DL (ref 35–150)
KETONES UR STRIP.AUTO-MCNC: NEGATIVE MG/DL
LDLC SERPL CALC-MCNC: 48 MG/DL
LEUKOCYTE ESTERASE UR QL STRIP.AUTO: NEGATIVE
LYMPHOCYTES # BLD AUTO: 1.16 X10*3/UL (ref 0.8–3)
LYMPHOCYTES NFR BLD AUTO: 23.2 %
MAGNESIUM SERPL-MCNC: 1.91 MG/DL (ref 1.6–2.4)
MCH RBC QN AUTO: 32.3 PG (ref 26–34)
MCHC RBC AUTO-ENTMCNC: 31.2 G/DL (ref 32–36)
MCV RBC AUTO: 103 FL (ref 80–100)
MONOCYTES # BLD AUTO: 0.36 X10*3/UL (ref 0.05–0.8)
MONOCYTES NFR BLD AUTO: 7.2 %
NEUTROPHILS # BLD AUTO: 3.37 X10*3/UL (ref 1.6–5.5)
NEUTROPHILS NFR BLD AUTO: 67.2 %
NITRITE UR QL STRIP.AUTO: NEGATIVE
NON HDL CHOLESTEROL: 63 MG/DL (ref 0–149)
NRBC BLD-RTO: 0 /100 WBCS (ref 0–0)
PH UR STRIP.AUTO: 7.5 [PH]
PLATELET # BLD AUTO: 167 X10*3/UL (ref 150–450)
POTASSIUM SERPL-SCNC: 4.6 MMOL/L (ref 3.5–5.3)
PROT SERPL-MCNC: 6.9 G/DL (ref 6.4–8.2)
PROT UR STRIP.AUTO-MCNC: NEGATIVE MG/DL
RBC # BLD AUTO: 3.78 X10*6/UL (ref 4–5.2)
RBC # UR STRIP.AUTO: NEGATIVE /UL
SODIUM SERPL-SCNC: 142 MMOL/L (ref 136–145)
SP GR UR STRIP.AUTO: 1.02
TIBC SERPL-MCNC: 370 UG/DL (ref 240–445)
TRIGL SERPL-MCNC: 73 MG/DL (ref 0–149)
TSH SERPL-ACNC: 2.25 MIU/L (ref 0.44–3.98)
UIBC SERPL-MCNC: 279 UG/DL (ref 110–370)
URATE SERPL-MCNC: 5.5 MG/DL (ref 2.3–6.7)
UROBILINOGEN UR STRIP.AUTO-MCNC: NORMAL MG/DL
VIT B12 SERPL-MCNC: 984 PG/ML (ref 211–911)
VLDL: 15 MG/DL (ref 0–40)
WBC # BLD AUTO: 5 X10*3/UL (ref 4.4–11.3)

## 2024-08-30 PROCEDURE — 36415 COLL VENOUS BLD VENIPUNCTURE: CPT

## 2024-08-30 PROCEDURE — 83036 HEMOGLOBIN GLYCOSYLATED A1C: CPT

## 2024-08-30 PROCEDURE — 83735 ASSAY OF MAGNESIUM: CPT

## 2024-08-30 PROCEDURE — 82550 ASSAY OF CK (CPK): CPT

## 2024-08-30 PROCEDURE — 83540 ASSAY OF IRON: CPT

## 2024-08-30 PROCEDURE — 83550 IRON BINDING TEST: CPT

## 2024-08-30 PROCEDURE — 81003 URINALYSIS AUTO W/O SCOPE: CPT

## 2024-08-30 PROCEDURE — 82728 ASSAY OF FERRITIN: CPT

## 2024-08-30 PROCEDURE — 85025 COMPLETE CBC W/AUTO DIFF WBC: CPT

## 2024-08-30 PROCEDURE — 82746 ASSAY OF FOLIC ACID SERUM: CPT

## 2024-08-30 PROCEDURE — 84443 ASSAY THYROID STIM HORMONE: CPT

## 2024-08-30 PROCEDURE — 82607 VITAMIN B-12: CPT

## 2024-08-30 PROCEDURE — 84550 ASSAY OF BLOOD/URIC ACID: CPT

## 2024-08-30 PROCEDURE — 80061 LIPID PANEL: CPT

## 2024-08-30 PROCEDURE — 80053 COMPREHEN METABOLIC PANEL: CPT

## 2024-08-30 NOTE — PROGRESS NOTES
Diagnoses and all orders for this visit:  Essential hypertension (Primary)  -     CBC and Auto Differential; Future  -     Comprehensive Metabolic Panel; Future  -     TSH with reflex to Free T4 if abnormal; Future  -     Magnesium; Future  -     Creatine Kinase; Future  -     Urinalysis with Reflex Culture and Microscopic; Future  Hyperglycemia  -     Comprehensive Metabolic Panel; Future  -     Hemoglobin A1C; Future  -     Urinalysis with Reflex Culture and Microscopic; Future  Mixed hyperlipidemia  -     Comprehensive Metabolic Panel; Future  -     Lipid Panel; Future  Vitamin D deficiency  -     Comprehensive Metabolic Panel; Future  Hypothyroidism due to acquired atrophy of thyroid  -     TSH with reflex to Free T4 if abnormal; Future  Frailty syndrome in geriatric patient  -     CBC and Auto Differential; Future  -     Comprehensive Metabolic Panel; Future  -     TSH with reflex to Free T4 if abnormal; Future  -     Magnesium; Future  -     Vitamin B12; Future  -     Folate; Future  -     Urinalysis with Reflex Culture and Microscopic; Future  Polyarthralgia  -     CBC and Auto Differential; Future  -     Comprehensive Metabolic Panel; Future  -     Vitamin B12; Future  -     Folate; Future  -     Uric Acid; Future  -     Creatine Kinase; Future  Anemia due to vitamin B12 deficiency, unspecified B12 deficiency type  -     CBC and Auto Differential; Future  -     Vitamin B12; Future  -     Folate; Future  -     Urinalysis with Reflex Culture and Microscopic; Future  -     Ferritin; Future  -     Iron and TIBC; Future  E. coli UTI  -     CBC and Auto Differential; Future  -     Comprehensive Metabolic Panel; Future  -     Urinalysis with Reflex Culture and Microscopic; Future

## 2024-09-06 ENCOUNTER — APPOINTMENT (OUTPATIENT)
Dept: PRIMARY CARE | Facility: CLINIC | Age: 79
End: 2024-09-06
Payer: MEDICARE

## 2024-09-06 VITALS
WEIGHT: 168 LBS | OXYGEN SATURATION: 96 % | HEIGHT: 64 IN | DIASTOLIC BLOOD PRESSURE: 73 MMHG | SYSTOLIC BLOOD PRESSURE: 125 MMHG | BODY MASS INDEX: 28.68 KG/M2 | HEART RATE: 70 BPM

## 2024-09-06 DIAGNOSIS — R29.6 FREQUENT FALLS: ICD-10-CM

## 2024-09-06 DIAGNOSIS — Z91.89 AT RISK FOR POLYPHARMACY: ICD-10-CM

## 2024-09-06 DIAGNOSIS — L30.9 ECZEMA, UNSPECIFIED TYPE: ICD-10-CM

## 2024-09-06 DIAGNOSIS — N39.46 MIXED STRESS AND URGE URINARY INCONTINENCE: ICD-10-CM

## 2024-09-06 DIAGNOSIS — D51.9 ANEMIA DUE TO VITAMIN B12 DEFICIENCY, UNSPECIFIED B12 DEFICIENCY TYPE: ICD-10-CM

## 2024-09-06 DIAGNOSIS — I10 ESSENTIAL HYPERTENSION: ICD-10-CM

## 2024-09-06 DIAGNOSIS — R60.0 BILATERAL EDEMA OF LOWER EXTREMITY: ICD-10-CM

## 2024-09-06 DIAGNOSIS — I47.10 PAROXYSMAL SVT (SUPRAVENTRICULAR TACHYCARDIA) (CMS-HCC): ICD-10-CM

## 2024-09-06 DIAGNOSIS — Z87.898 HISTORY OF DELIRIUM: ICD-10-CM

## 2024-09-06 DIAGNOSIS — F41.8 DEPRESSION WITH ANXIETY: ICD-10-CM

## 2024-09-06 DIAGNOSIS — E55.9 VITAMIN D DEFICIENCY: ICD-10-CM

## 2024-09-06 DIAGNOSIS — M25.50 POLYARTHRALGIA: ICD-10-CM

## 2024-09-06 DIAGNOSIS — H90.3 SENSORINEURAL HEARING LOSS (SNHL) OF BOTH EARS: ICD-10-CM

## 2024-09-06 DIAGNOSIS — R26.0 ATAXIC GAIT: ICD-10-CM

## 2024-09-06 DIAGNOSIS — J44.9 CHRONIC OBSTRUCTIVE PULMONARY DISEASE, UNSPECIFIED COPD TYPE (MULTI): ICD-10-CM

## 2024-09-06 DIAGNOSIS — G31.84 MILD COGNITIVE IMPAIRMENT: ICD-10-CM

## 2024-09-06 DIAGNOSIS — E83.42 HYPOMAGNESEMIA: ICD-10-CM

## 2024-09-06 DIAGNOSIS — R54 FRAILTY SYNDROME IN GERIATRIC PATIENT: Primary | ICD-10-CM

## 2024-09-06 DIAGNOSIS — R73.9 HYPERGLYCEMIA: ICD-10-CM

## 2024-09-06 DIAGNOSIS — E78.00 PURE HYPERCHOLESTEROLEMIA WITH TARGET LOW DENSITY LIPOPROTEIN (LDL) CHOLESTEROL LESS THAN 70 MG/DL: ICD-10-CM

## 2024-09-06 DIAGNOSIS — S22.000A COMPRESSION FRACTURE OF BODY OF THORACIC VERTEBRA (MULTI): ICD-10-CM

## 2024-09-06 PROBLEM — R53.83 FATIGUE: Status: ACTIVE | Noted: 2024-05-05

## 2024-09-06 RX ORDER — ROSUVASTATIN CALCIUM 20 MG/1
20 TABLET, COATED ORAL NIGHTLY
Qty: 90 TABLET | Refills: 1 | Status: SHIPPED | OUTPATIENT
Start: 2024-09-06

## 2024-09-06 RX ORDER — ALBUTEROL SULFATE 90 UG/1
2 INHALANT RESPIRATORY (INHALATION) EVERY 4 HOURS PRN
Qty: 18 G | Refills: 3 | Status: SHIPPED | OUTPATIENT
Start: 2024-09-06 | End: 2025-09-06

## 2024-09-06 RX ORDER — TRIAMCINOLONE ACETONIDE 1 MG/G
OINTMENT TOPICAL 2 TIMES DAILY PRN
Qty: 60 G | Refills: 0 | Status: SHIPPED | OUTPATIENT
Start: 2024-09-06 | End: 2025-01-04

## 2024-09-06 RX ORDER — ARIPIPRAZOLE 5 MG/1
5 TABLET ORAL DAILY
Qty: 30 TABLET | Refills: 0 | Status: SHIPPED | OUTPATIENT
Start: 2024-09-06 | End: 2024-10-06

## 2024-09-06 RX ORDER — ALENDRONATE SODIUM 70 MG/1
70 TABLET ORAL
Qty: 13 TABLET | Refills: 1 | Status: SHIPPED | OUTPATIENT
Start: 2024-09-06 | End: 2025-03-07

## 2024-09-06 RX ORDER — CHOLECALCIFEROL (VITAMIN D3) 1250 MCG
TABLET ORAL
Qty: 13 TABLET | Refills: 1 | Status: SHIPPED | OUTPATIENT
Start: 2024-09-06

## 2024-09-06 RX ORDER — DONEPEZIL HYDROCHLORIDE 5 MG/1
TABLET, FILM COATED ORAL
Qty: 90 TABLET | Refills: 0 | Status: SHIPPED | OUTPATIENT
Start: 2024-09-06

## 2024-09-06 NOTE — PATIENT INSTRUCTIONS
Thank you very much for coming.  I am so very happy to see you!  Thank you for your visit, and for your trust.    I have to say that the previous doctors who took care of you have already taken care of of the initial things that need to be evaluated in order to continue to improve your overall quality of life!    I did check for infection in your urine, or decreased thyroid function, or low vitamin levels, or sluggish kidneys.  All of your tests came back very good!    Furthermore, you are already on very good medications, but now that you continue to improve and recover, we should start upgrading your medications!    Please continue taking your MIRTAZAPINE/REMERON.  This is best taken at bedtime.  In the future, this will probably be the first medication that we will adjust.    Likewise, please continue taking your ARIPIPRAZOLE/ABILIFY.  If this medication makes you sleepy or tired, please take it in the evening or at bedtime.  You have only been taking this medication once a day.  Please continue to do so.    I would like to add low-dose DONEPEZIL/ARICEPT.  This is a classic medication that is used to preserve your brain function!  It can be taken up to 10 mg a day, but let us start with only 5 mg by mouth at bedtime.  Watch out for sleepiness.  No driving please if you are drowsy.  Never take with alcohol.    Please let me see you again in 3 weeks, so that we can further tailor your regimens.    I also do understand that you have been having trouble with INCONTINENCE.  If I do it properly, I can have your INSURANCE to pay at least part of the expense of using adult diapers.  In the meantime, please try to remember to URINATE BEFORE you have to urinate.  You can set it up so that you are scheduled to urinate every 2 hours while you are awake.  Also, after supper, please decrease your fluid intake to only sips of water, and try your best to urinate more completely before going to bed.  Go ahead and wear adult  diapers overnight to protect yourself from incontinence.    Initially, I thought that I could give you medication that will help you with your MIXED INCONTINENCE, especially the urge to have to urinate all the time.  We did check you for a urine infection, but it came back normal, no infection.    All the medications that I could come up with will endanger you by causing arrhythmia, especially in conjunction with a very important medication, FLECAINIDE.  As such, we cannot use MYRBETRIQ or Ditropan, or any of the usual medications to help you with your bladder irritability and excitability.    Instead, I will refer you to a colleague of lila, Dr. Thompson, UROGYNECOLOGY.  This is his specialty.  He will be able to help us without causing any further worsening of your history of arrhythmia.    I am sorry that we are unable to use Myrbetriq or any of the medications of this drug class.    I do understand your concerns especially regarding your weakness and seeming dependence on your walker and wheelchair.  Let me contact [facility], and let me avail of more HOME CARE including PHYSICAL THERAPY at home.    Again, thank you very much for coming.  Let me take care of your refills.  Let me continue to study your chart tonight, and if I come up with more ideas, I will call or text.    Indeed, if you have KETOCONAZOLE, this will be good for the flaking affecting your hairline.  Please try this first.  TRIAMCINOLONE may also be helpful in the future, but for now, just try ketoconazole first.    Again, thank you very much for coming.  Please do not hesitate to call with questions or concerns.  Please continue to take care of yourself and each other, and please continue to pray for our recovery from this pandemic.  Take care and God bless.  See you in 3 weeks.            0  Return in 3 weeks.  40 minutes please.  Reassess debility.  Consider discontinuation of mirtazapine in favor of other psychoactive regimens.  Consider  addition of more activating antidepressants, including bupropion or venlafaxine.  Consider discontinuation of aripiprazole.  Consider referral to psychiatry or neurology depending on presentation.  Coordinate with home care.  Reassess incontinence.  Consider referral to dermatology.            0

## 2024-09-06 NOTE — PROGRESS NOTES
Subjective   Patient ID: Theodora Goel is a 78 y.o. female who presents for Follow-up (Patient presented today for a 3 week follow up.).    HPI   Remarkably, the patient presents today much more aware of her surroundings and her history of deficits.  In fact, she presents today with a mental list of her concerns.  Furthermore, she is sitting upright, and speaking clearly and concisely, and with purpose.    One of her concerns is that she feels her memory is not what it used to be.  She has episodes of word finding.  Furthermore, she clearly wants to get better and improve her overall quality of life.    Psychoactive regimens reviewed, including mirtazapine as well as aripiprazole.  With her recent history taken into consideration, the son/POA, Yoav, did point out that the patient was evaluated by psychiatry, and her current medications were carefully adjusted, until she was left with her MIRTAZAPINE 7.5 mg by mouth at bedtime, as well as ARIPIPRAZOLE 5 mg by mouth every day.  He does point out that her condition was much different compared to how she is now, and that perhaps, it is time to make adjustments to further improve her cognition.    We also did acknowledge the fact that during the time of illness and stress, the patient did have confusion, and perhaps even suspected DELIRIUM.  No recurrence of symptoms noted.  Currently, no headache, blurred vision, diplopia.  No dysphagia.    The patient also expressed frustration of continued GENERALIZED WEAKNESS.  She pointed out that she did not expect herself to be dependent on her WALKER and WHEELCHAIR, but that is what is happening at the moment.  Continues to want to improve quality of life, and does not wish harm to self or others.    Also does complain of myalgia and polyarthralgia.    Also noted to have continued flaking, perhaps worsening of her condition compared to initial visit.  She does have KETOCONAZOLE which she will try to use.    She also did  "volunteer that she was having INCONTINENCE, and that now, she is using adult diapers.  She was hoping that she would show positive for a urine infection, but understands that her urine actually came back clean.  Urgency, frequency, incontinence with stress, but otherwise, perhaps minimal dysuria, no flank, no suprapubic, no malodor.    Again, the patient continues to improve cognitively, and continues to regain ability to do her own activities of daily living, but does want to continue to use HOME CARE.  In fact, she would like to do PHYSICAL THERAPY at home.    Otherwise, no dano substernal chest pain, no orthopnea, no paroxysmal nocturnal dyspnea.  No particular cough, no particular sputum production.  CONSTITUTIONALLY, no fever, no chills.  No night sweats.  No lingering anorexia or nausea.  No apparent lymphadenopathy.  No apparent weight loss.        Review of Systems  Review of systems as in history of present illness, and otherwise, reviewed separately as well, and was unremarkable/negative/noncontributory.        Objective   /73 (BP Location: Left arm, Patient Position: Sitting, BP Cuff Size: Adult)   Pulse 70   Ht 1.626 m (5' 4\")   Wt 76.2 kg (168 lb)   SpO2 96%   BMI 28.84 kg/m²     Physical Exam  Appearing with a purpose!  Hopeful.  Not in distress or diaphoresis.  Alert, oriented x 3.  Amiable.  Not unkempt.  Moderately built, not cachectic.  Receptive, cheerful, appropriate, and eager to get better.  Not wishing harm to self or others.    HEAD pink palpebral conjunctivae, anicteric sclerae.  NECK supple, no apparent jugular venous distention.  CARDIOVASCULAR not in distress or diaphoresis.  No bipedal edema.  LUNGS not in distress or diaphoresis.  Not using accessory muscles.  ABDOMEN soft, nontender.  BACK no costovertebral angle tenderness.  EXTREMITIES no clubbing, no cyanosis.  NEURO no facial asymmetry.  No apparent cranial nerve deficits.  Romberg negative.  Ambulating without need of " assistance.  No apparent focal weakness.  No tremors.  PSYCH receptive, appropriate, and eager to maintain and improve quality of life.        LABORATORY results reviewed, hemoglobin A1c 5.3.  BMI 28.84.  Cholesterol acceptable, liver and kidney function preserved.  Ferritin 77, iron panel negative.  No anemia.  B12 and folic acid normal.  History of macrocytic anemia.  Thyroid function adequate.        Assessment/Plan   Diagnoses and all orders for this visit:  Frailty syndrome in geriatric patient  -     Referral to Primary [facility]  -     Referral to Primary [facility]  -     Follow Up In Primary Care - [facility]  -     Follow Up In Primary Care - [facility]; Future  Bilateral edema of lower extremity  -     Follow Up In Primary Care [facility]; Future  Frequent falls  -     Follow Up In Primary Care [facility]; Future  Sensorineural hearing loss (SNHL) of both ears  -     Follow Up In Primary Care [facility]; Future  At risk for polypharmacy  -     Follow Up In Primary Care [facility]; Future  Ataxic gait  -     Follow Up In Primary Care [facility]; Future  Polyarthralgia  -     Follow Up In Primary Care [facility]; Future  Essential hypertension  -     Follow Up In Primary Care [facility]; Future  Pure hypercholesterolemia with target low density lipoprotein (LDL) cholesterol less than 70 mg/dL  -     rosuvastatin (Crestor) 20 mg tablet; Take 1 tablet (20 mg) by mouth once daily at bedtime.  -     Follow Up In Primary Care - [facility]; Future  Hyperglycemia  -     Follow Up In Primary Care - [facility]; Future  Paroxysmal SVT (supraventricular tachycardia) (CMS-HCC)  -     Follow Up In Primary Care - [facility]; Future  Chronic obstructive pulmonary disease, unspecified COPD type (Multi)  -     albuterol 90 mcg/actuation inhaler; Inhale 2 puffs every 4 hours if needed for shortness of breath or wheezing.  -     Follow Up In Primary Care - [facility];  Future  Anemia due to vitamin B12 deficiency, unspecified B12 deficiency type  -     Follow Up In Primary Care - [facility]; Future  Hypomagnesemia  -     Follow Up In Primary Care - [facility]; Future  Vitamin D deficiency  -     cholecalciferol (Vitamin D3) 1,250 mcg (50,000 unit) tablet; Please take 1 tablet by mouth Sundays, with lunch and a full glass of water.  Thank you.  -     Follow Up In Primary Care - [facility]; Future  Eczema, unspecified type  -     triamcinolone (Kenalog) 0.1 % ointment; Apply topically 2 times a day as needed for irritation or rash.  -     Follow Up In Primary Care - [facility]; Future  Compression fracture of body of thoracic vertebra (Multi)  -     alendronate (Fosamax) 70 mg tablet; Take 1 tablet (70 mg) by mouth every 7 days. Take in the morning with a full glass of water, on an empty stomach, and do not take anything else by mouth or lie down for the next 30 min.  -     Follow Up In Primary Care - [facility]; Future  Mild cognitive impairment  -     Follow Up In Primary Care [facility]; Future  -     donepezil (Aricept) 5 mg tablet; Please take 1 tablet by mouth at bedtime.  Thank you.  History of delirium  -     Follow Up In Primary Care [facility]; Future  -     donepezil (Aricept) 5 mg tablet; Please take 1 tablet by mouth at bedtime.  Thank you.  Depression with anxiety  -     ARIPiprazole (Abilify) 5 mg tablet; Take 1 tablet (5 mg) by mouth once daily.  -     Follow Up In Primary Care [facility]; Future  -     donepezil (Aricept) 5 mg tablet; Please take 1 tablet by mouth at bedtime.  Thank you.  Mixed stress and urge urinary incontinence  -     Follow Up In Primary Care - [facility]; Future  -     Referral to Urogynecology; Future       Thank you very much for coming.  I am so very happy to see you!  Thank you for your visit, and for your trust.    I have to say that the previous doctors who took care of you have already taken care of of the initial things  that need to be evaluated in order to continue to improve your overall quality of life!    I did check for infection in your urine, or decreased thyroid function, or low vitamin levels, or sluggish kidneys.  All of your tests came back very good!    Furthermore, you are already on very good medications, but now that you continue to improve and recover, we should start upgrading your medications!    Please continue taking your MIRTAZAPINE/REMERON.  This is best taken at bedtime.  In the future, this will probably be the first medication that we will adjust.    Likewise, please continue taking your ARIPIPRAZOLE/ABILIFY.  If this medication makes you sleepy or tired, please take it in the evening or at bedtime.  You have only been taking this medication once a day.  Please continue to do so.    I would like to add low-dose DONEPEZIL/ARICEPT.  This is a classic medication that is used to preserve your brain function!  It can be taken up to 10 mg a day, but let us start with only 5 mg by mouth at bedtime.  Watch out for sleepiness.  No driving please if you are drowsy.  Never take with alcohol.    Please let me see you again in 3 weeks, so that we can further tailor your regimens.    I also do understand that you have been having trouble with INCONTINENCE.  If I do it properly, I can have your INSURANCE to pay at least part of the expense of using adult diapers.  In the meantime, please try to remember to URINATE BEFORE you have to urinate.  You can set it up so that you are scheduled to urinate every 2 hours while you are awake.  Also, after supper, please decrease your fluid intake to only sips of water, and try your best to urinate more completely before going to bed.  Go ahead and wear adult diapers overnight to protect yourself from incontinence.    Initially, I thought that I could give you medication that will help you with your MIXED INCONTINENCE, especially the urge to have to urinate all the time.  We did check  you for a urine infection, but it came back normal, no infection.    All the medications that I could come up with will endanger you by causing arrhythmia, especially in conjunction with a very important medication, FLECAINIDE.  As such, we cannot use MYRBETRIQ or Ditropan, or any of the usual medications to help you with your bladder irritability and excitability.    Instead, I will refer you to a colleague of lila, Dr. Thompson, UROGYNECOLOGY.  This is his specialty.  He will be able to help us without causing any further worsening of your history of arrhythmia.    I am sorry that we are unable to use Myrbetriq or any of the medications of this drug class.    I do understand your concerns especially regarding your weakness and seeming dependence on your walker and wheelchair.  Let me contact [facility], and let me avail of more HOME CARE including PHYSICAL THERAPY at home.    Again, thank you very much for coming.  Let me take care of your refills.  Let me continue to study your chart tonight, and if I come up with more ideas, I will call or text.    Indeed, if you have KETOCONAZOLE, this will be good for the flaking affecting your hairline.  Please try this first.  TRIAMCINOLONE may also be helpful in the future, but for now, just try ketoconazole first.    Again, thank you very much for coming.  Please do not hesitate to call with questions or concerns.  Please continue to take care of yourself and each other, and please continue to pray for our recovery from this pandemic.  Take care and God bless.  See you in 3 weeks.            0  Return in 3 weeks.  40 minutes please.  Reassess debility.  Consider discontinuation of mirtazapine in favor of other psychoactive regimens.  Consider addition of more activating antidepressants, including bupropion or venlafaxine.  Consider discontinuation of aripiprazole.  Consider referral to psychiatry or neurology depending on presentation.  Coordinate with home care.   Reassess incontinence.  Consider referral to dermatology.            0  Total time of care 68 minutes.            0  The patient will benefit from referral to UROGYNECOLOGY, Dr. Thompson.  Cannot take usual regimens for incontinence without increasing risk of prolonged QT interval and arrhythmia.    She will also benefit from increased services from [facility] to include PHYSICAL THERAPY.    She will also benefit from ADULT DIAPERS that hopefully can be covered by her insurance.            0

## 2024-09-12 ENCOUNTER — TELEPHONE (OUTPATIENT)
Dept: PRIMARY CARE | Facility: CLINIC | Age: 79
End: 2024-09-12
Payer: MEDICARE

## 2024-09-16 DIAGNOSIS — E03.9 HYPOTHYROIDISM, ADULT: ICD-10-CM

## 2024-09-16 DIAGNOSIS — I47.10 PAROXYSMAL SVT (SUPRAVENTRICULAR TACHYCARDIA) (CMS-HCC): ICD-10-CM

## 2024-09-16 DIAGNOSIS — I10 ESSENTIAL HYPERTENSION: Primary | ICD-10-CM

## 2024-09-16 RX ORDER — LEVOTHYROXINE SODIUM 125 UG/1
TABLET ORAL
Qty: 90 TABLET | Refills: 3 | Status: SHIPPED | OUTPATIENT
Start: 2024-09-16 | End: 2024-09-17 | Stop reason: SDUPTHER

## 2024-09-16 RX ORDER — LOSARTAN POTASSIUM 50 MG/1
TABLET ORAL
Qty: 90 TABLET | Refills: 0 | Status: SHIPPED | OUTPATIENT
Start: 2024-09-16 | End: 2024-09-17 | Stop reason: SDUPTHER

## 2024-09-16 RX ORDER — METOPROLOL TARTRATE 25 MG/1
TABLET, FILM COATED ORAL
Qty: 180 TABLET | Refills: 1 | Status: SHIPPED | OUTPATIENT
Start: 2024-09-16 | End: 2024-09-17 | Stop reason: SDUPTHER

## 2024-09-17 DIAGNOSIS — I47.10 PAROXYSMAL SVT (SUPRAVENTRICULAR TACHYCARDIA) (CMS-HCC): ICD-10-CM

## 2024-09-17 DIAGNOSIS — F41.9 ANXIETY: ICD-10-CM

## 2024-09-17 DIAGNOSIS — I10 ESSENTIAL HYPERTENSION: ICD-10-CM

## 2024-09-17 DIAGNOSIS — E03.9 HYPOTHYROIDISM, ADULT: ICD-10-CM

## 2024-09-17 RX ORDER — LEVOTHYROXINE SODIUM 125 UG/1
TABLET ORAL
Qty: 90 TABLET | Refills: 3 | Status: CANCELLED | OUTPATIENT
Start: 2024-09-17

## 2024-09-17 RX ORDER — LOSARTAN POTASSIUM 50 MG/1
TABLET ORAL
Qty: 90 TABLET | Refills: 0 | Status: CANCELLED | OUTPATIENT
Start: 2024-09-17

## 2024-09-17 RX ORDER — MIRTAZAPINE 7.5 MG/1
TABLET, FILM COATED ORAL
Qty: 4 TABLET | Refills: 0 | Status: SHIPPED | OUTPATIENT
Start: 2024-09-17

## 2024-09-17 RX ORDER — METOPROLOL TARTRATE 25 MG/1
TABLET, FILM COATED ORAL
Qty: 180 TABLET | Refills: 1 | Status: SHIPPED | OUTPATIENT
Start: 2024-09-17

## 2024-09-17 RX ORDER — LOSARTAN POTASSIUM 50 MG/1
TABLET ORAL
Qty: 90 TABLET | Refills: 0 | Status: SHIPPED | OUTPATIENT
Start: 2024-09-17

## 2024-09-17 RX ORDER — METOPROLOL TARTRATE 25 MG/1
TABLET, FILM COATED ORAL
Qty: 180 TABLET | Refills: 1 | Status: CANCELLED | OUTPATIENT
Start: 2024-09-17

## 2024-09-17 RX ORDER — LEVOTHYROXINE SODIUM 125 UG/1
TABLET ORAL
Qty: 90 TABLET | Refills: 3 | Status: SHIPPED | OUTPATIENT
Start: 2024-09-17

## 2024-09-23 ENCOUNTER — APPOINTMENT (OUTPATIENT)
Dept: CARDIOLOGY | Facility: CLINIC | Age: 79
End: 2024-09-23
Payer: MEDICARE

## 2024-09-30 ENCOUNTER — APPOINTMENT (OUTPATIENT)
Dept: CARDIOLOGY | Facility: CLINIC | Age: 79
End: 2024-09-30
Payer: MEDICARE

## 2024-10-02 ENCOUNTER — APPOINTMENT (OUTPATIENT)
Dept: CARDIOLOGY | Facility: HOSPITAL | Age: 79
End: 2024-10-02
Payer: MEDICARE

## 2024-10-08 ENCOUNTER — APPOINTMENT (OUTPATIENT)
Dept: PRIMARY CARE | Facility: CLINIC | Age: 79
End: 2024-10-08
Payer: MEDICARE

## 2024-10-09 ENCOUNTER — APPOINTMENT (OUTPATIENT)
Dept: OTOLARYNGOLOGY | Facility: CLINIC | Age: 79
End: 2024-10-09
Payer: MEDICARE

## 2024-10-11 ENCOUNTER — APPOINTMENT (OUTPATIENT)
Dept: PRIMARY CARE | Facility: CLINIC | Age: 79
End: 2024-10-11
Payer: MEDICARE

## 2024-10-31 DIAGNOSIS — F41.9 ANXIETY: ICD-10-CM

## 2024-11-04 ENCOUNTER — APPOINTMENT (OUTPATIENT)
Dept: OBSTETRICS AND GYNECOLOGY | Facility: CLINIC | Age: 79
End: 2024-11-04
Payer: MEDICARE

## 2024-11-04 RX ORDER — MIRTAZAPINE 7.5 MG/1
TABLET, FILM COATED ORAL
Qty: 4 TABLET | Refills: 0 | Status: SHIPPED | OUTPATIENT
Start: 2024-11-04

## 2024-11-11 DIAGNOSIS — F41.8 DEPRESSION WITH ANXIETY: Primary | ICD-10-CM

## 2024-11-11 RX ORDER — PAROXETINE 10 MG/1
TABLET, FILM COATED ORAL
Qty: 30 TABLET | Refills: 1 | Status: SHIPPED | OUTPATIENT
Start: 2024-11-11

## 2024-11-18 ENCOUNTER — APPOINTMENT (OUTPATIENT)
Dept: CARDIOLOGY | Facility: CLINIC | Age: 79
End: 2024-11-18
Payer: MEDICARE

## 2024-11-22 ENCOUNTER — APPOINTMENT (OUTPATIENT)
Dept: PRIMARY CARE | Facility: CLINIC | Age: 79
End: 2024-11-22
Payer: MEDICARE

## 2024-11-22 VITALS
DIASTOLIC BLOOD PRESSURE: 65 MMHG | HEIGHT: 66 IN | WEIGHT: 156 LBS | OXYGEN SATURATION: 95 % | HEART RATE: 84 BPM | BODY MASS INDEX: 25.07 KG/M2 | SYSTOLIC BLOOD PRESSURE: 157 MMHG

## 2024-11-22 DIAGNOSIS — R26.0 ATAXIC GAIT: ICD-10-CM

## 2024-11-22 DIAGNOSIS — I10 ESSENTIAL HYPERTENSION: ICD-10-CM

## 2024-11-22 DIAGNOSIS — J44.9 CHRONIC OBSTRUCTIVE PULMONARY DISEASE, UNSPECIFIED COPD TYPE (MULTI): ICD-10-CM

## 2024-11-22 DIAGNOSIS — G31.84 MILD COGNITIVE IMPAIRMENT: ICD-10-CM

## 2024-11-22 DIAGNOSIS — H90.3 SENSORINEURAL HEARING LOSS (SNHL) OF BOTH EARS: ICD-10-CM

## 2024-11-22 DIAGNOSIS — E83.42 HYPOMAGNESEMIA: ICD-10-CM

## 2024-11-22 DIAGNOSIS — F41.8 DEPRESSION WITH ANXIETY: ICD-10-CM

## 2024-11-22 DIAGNOSIS — Z87.898 HISTORY OF DELIRIUM: ICD-10-CM

## 2024-11-22 DIAGNOSIS — R29.6 FREQUENT FALLS: ICD-10-CM

## 2024-11-22 DIAGNOSIS — E55.9 VITAMIN D DEFICIENCY: ICD-10-CM

## 2024-11-22 DIAGNOSIS — N39.46 MIXED STRESS AND URGE URINARY INCONTINENCE: ICD-10-CM

## 2024-11-22 DIAGNOSIS — E78.00 PURE HYPERCHOLESTEROLEMIA WITH TARGET LOW DENSITY LIPOPROTEIN (LDL) CHOLESTEROL LESS THAN 70 MG/DL: ICD-10-CM

## 2024-11-22 DIAGNOSIS — Z00.00 ROUTINE GENERAL MEDICAL EXAMINATION AT HEALTH CARE FACILITY: Primary | ICD-10-CM

## 2024-11-22 DIAGNOSIS — F41.9 ANXIETY: ICD-10-CM

## 2024-11-22 DIAGNOSIS — L30.9 ECZEMA, UNSPECIFIED TYPE: ICD-10-CM

## 2024-11-22 DIAGNOSIS — D51.9 ANEMIA DUE TO VITAMIN B12 DEFICIENCY, UNSPECIFIED B12 DEFICIENCY TYPE: ICD-10-CM

## 2024-11-22 DIAGNOSIS — I47.10 PAROXYSMAL SVT (SUPRAVENTRICULAR TACHYCARDIA) (CMS-HCC): ICD-10-CM

## 2024-11-22 DIAGNOSIS — M25.50 POLYARTHRALGIA: ICD-10-CM

## 2024-11-22 DIAGNOSIS — R54 FRAILTY SYNDROME IN GERIATRIC PATIENT: ICD-10-CM

## 2024-11-22 DIAGNOSIS — R73.9 HYPERGLYCEMIA: ICD-10-CM

## 2024-11-22 DIAGNOSIS — S22.000A COMPRESSION FRACTURE OF BODY OF THORACIC VERTEBRA (MULTI): ICD-10-CM

## 2024-11-22 DIAGNOSIS — Z91.89 AT RISK FOR POLYPHARMACY: ICD-10-CM

## 2024-11-22 DIAGNOSIS — R60.0 BILATERAL EDEMA OF LOWER EXTREMITY: ICD-10-CM

## 2024-11-22 RX ORDER — MIRTAZAPINE 7.5 MG/1
TABLET, FILM COATED ORAL
Qty: 8 TABLET | Refills: 1 | Status: SHIPPED | OUTPATIENT
Start: 2024-11-22

## 2024-11-22 RX ORDER — ARIPIPRAZOLE 5 MG/1
5 TABLET ORAL DAILY
Qty: 30 TABLET | Refills: 1 | Status: SHIPPED | OUTPATIENT
Start: 2024-11-22 | End: 2025-01-21

## 2024-11-22 RX ORDER — DULOXETIN HYDROCHLORIDE 20 MG/1
20 CAPSULE, DELAYED RELEASE ORAL 2 TIMES DAILY
Qty: 60 CAPSULE | Refills: 1 | Status: SHIPPED | OUTPATIENT
Start: 2024-11-22 | End: 2025-05-21

## 2024-11-22 ASSESSMENT — ENCOUNTER SYMPTOMS
LOSS OF SENSATION IN FEET: 0
OCCASIONAL FEELINGS OF UNSTEADINESS: 1
DEPRESSION: 1

## 2024-11-22 ASSESSMENT — PATIENT HEALTH QUESTIONNAIRE - PHQ9
6. FEELING BAD ABOUT YOURSELF - OR THAT YOU ARE A FAILURE OR HAVE LET YOURSELF OR YOUR FAMILY DOWN: SEVERAL DAYS
SUM OF ALL RESPONSES TO PHQ QUESTIONS 1-9: 13
10. IF YOU CHECKED OFF ANY PROBLEMS, HOW DIFFICULT HAVE THESE PROBLEMS MADE IT FOR YOU TO DO YOUR WORK, TAKE CARE OF THINGS AT HOME, OR GET ALONG WITH OTHER PEOPLE: SOMEWHAT DIFFICULT
7. TROUBLE CONCENTRATING ON THINGS, SUCH AS READING THE NEWSPAPER OR WATCHING TELEVISION: MORE THAN HALF THE DAYS
1. LITTLE INTEREST OR PLEASURE IN DOING THINGS: MORE THAN HALF THE DAYS
2. FEELING DOWN, DEPRESSED OR HOPELESS: MORE THAN HALF THE DAYS
3. TROUBLE FALLING OR STAYING ASLEEP OR SLEEPING TOO MUCH: MORE THAN HALF THE DAYS
9. THOUGHTS THAT YOU WOULD BE BETTER OFF DEAD, OR OF HURTING YOURSELF: NOT AT ALL
4. FEELING TIRED OR HAVING LITTLE ENERGY: MORE THAN HALF THE DAYS
SUM OF ALL RESPONSES TO PHQ9 QUESTIONS 1 AND 2: 4
5. POOR APPETITE OR OVEREATING: NOT AT ALL
8. MOVING OR SPEAKING SO SLOWLY THAT OTHER PEOPLE COULD HAVE NOTICED. OR THE OPPOSITE, BEING SO FIGETY OR RESTLESS THAT YOU HAVE BEEN MOVING AROUND A LOT MORE THAN USUAL: MORE THAN HALF THE DAYS

## 2024-11-22 ASSESSMENT — ACTIVITIES OF DAILY LIVING (ADL)
TAKING_MEDICATION: INDEPENDENT
BATHING: INDEPENDENT
GROCERY_SHOPPING: NEEDS ASSISTANCE
DOING_HOUSEWORK: NEEDS ASSISTANCE
MANAGING_FINANCES: NEEDS ASSISTANCE
DRESSING: INDEPENDENT

## 2024-11-22 NOTE — PROGRESS NOTES
"Subjective   Reason for Visit: Theodora JAMESON Bryson Goel is an 78 y.o. female here for a Medicare Wellness visit.     Past Medical, Surgical, and Family History reviewed and updated in chart.    Reviewed all medications by prescribing practitioner or clinical pharmacist (such as prescriptions, OTCs, herbal therapies and supplements) and documented in the medical record.    HPI  The patient is more awake and alert, and indeed, would like to improve quality of life, not wishing harm to self or others.  Immediately stating, \"I am depressed, tired, and anxious.  I feel jumpy inside, but when I checked my heart rate, it is normal.\"  The patient is stating that she does want to be treated for depression and anxiety.  She also states that she experiences HYPERSOMNIA, but she does feel refreshed when she does get up after a full nights rest.    Unfortunately, she apparently could not tolerate PAROXETINE, with tiredness, anxiety, shakiness, as well as nausea and diarrhea.  After 4 days, she decided to stop taking this medication, with complete recovery from symptoms.    She ran out all of ARIPIPRAZOLE, and has not been on it for 5 days, but states that she did not notice any particular change in her mentation or mood.  She continues to take MIRTAZAPINE Monday and Thursday only, twice a week.  States that her appetite is baseline, she enjoys Meals on Wheels.  She feels this is a balanced diet.  Currently, no abdominal distress.  No apparent blood or mucus in stool.  No dysuria, flank, suprapubic pain.  No skin changes, rashes, pruritus, jaundice.    Otherwise, currently, no dano substernal chest pain, no orthopnea, no paroxysmal nocturnal dyspnea.  No particular cough, no particular sputum production.  CONSTITUTIONALLY, no fever, no chills.  No night sweats.  No lingering anorexia or nausea.  No apparent lymphadenopathy.  No apparent weight loss.      Medicare Wellness evaluation, living will wishes, CODE STATUS, all reviewed " "during this visit as well, please see.    Patient Care Team:  Gennaro Ochoa MD as PCP - General (Internal Medicine)  Eric Martins MD as Consulting Physician (Cardiology)  Wilmer Liu MD as Consulting Physician (Cardiology)         Review of Systems  Review of systems as in history of present illness, and otherwise, reviewed separately as well, and was unremarkable/negative/noncontributory.        Objective   Vitals:  /65 (BP Location: Left arm, Patient Position: Sitting, BP Cuff Size: Adult)   Pulse 84   Ht 1.676 m (5' 6\")   Wt 70.8 kg (156 lb)   SpO2 95%   BMI 25.18 kg/m²     Blood pressure remaining elevated even after rest.  Patient will check blood pressure at home, reportedly much better, 120s to 130 systolically.    Physical Exam  HEAD pink palpebral conjunctivae, anicteric sclerae.  Mucous membranes moist.  NECK supple, no apparent jugular venous distention.  No carotid bruit.  CARDIOVASCULAR not in distress or diaphoresis.  No bipedal edema.  Regular rate and rhythm.  No murmurs appreciated.  LUNGS not in distress or diaphoresis.  Not using accessory muscles.  Clear to auscultation bilaterally.  ABDOMEN soft, nontender.  BACK no costovertebral angle tenderness.  EXTREMITIES no clubbing, no cyanosis.  NEURO no facial asymmetry.  No apparent cranial nerve deficits.  Comfortable in wheelchair.  No apparent focal weakness.  No tremors.  Not tremulous.  PSYCH receptive, appropriate, and eager to maintain and improve quality of life.        LABORATORY results reviewed with patient, with recovery from iron deficiency anemia noted.  Kidney function also stable, history of azotemia, rule out chronic kidney disease stage IIIa.  Thyroid function preserved.  Magnesium replenished.  Vitamin B12 replenished.        ASSESSMENT/Plans:  Theodora was seen today for medicare annual wellness visit initial.  Diagnoses and all orders for this visit:  Routine general medical examination at [facility] " facility (Primary)  -     Follow Up In Primary Care - Established; Future  Bilateral edema of lower extremity  -     Follow Up In Primary Care - Established  -     Follow Up In Primary Care - Established; Future  Frequent falls  -     Follow Up In Primary Care - Established; Future  Frailty syndrome in geriatric patient  -     Follow Up In Primary Care - Established; Future  Sensorineural hearing loss (SNHL) of both ears  -     Follow Up In Primary Care - Established; Future  At risk for polypharmacy  -     Follow Up In Primary Care - Established; Future  Ataxic gait  -     Follow Up In Primary Care - Established; Future  Polyarthralgia  -     Follow Up In Primary Care - Established; Future  Essential hypertension  -     Follow Up In Primary Care - Established; Future  Hyperglycemia  -     Follow Up In Primary Care - Established; Future  Paroxysmal SVT (supraventricular tachycardia) (CMS-HCC)  -     Follow Up In Primary Care - Established; Future  Chronic obstructive pulmonary disease, unspecified COPD type (Multi)  -     Follow Up In Primary Care - Established; Future  Anemia due to vitamin B12 deficiency, unspecified B12 deficiency type  -     Follow Up In Primary Care - Established; Future  Hypomagnesemia  -     Follow Up In Primary Care - Established; Future  Vitamin D deficiency  -     Follow Up In Primary Care - Established; Future  Eczema, unspecified type  -     Follow Up In Primary Care - Established; Future  Compression fracture of body of thoracic vertebra (Multi)  -     Follow Up In Primary Care - Established; Future  Pure hypercholesterolemia with target low density lipoprotein (LDL) cholesterol less than 70 mg/dL  -     Follow Up In Primary Care - Established; Future  Mild cognitive impairment  -     Follow Up In Primary Care - Established; Future  History of delirium  -     Follow Up In Primary Care - Established; Future  Depression with anxiety  -     ARIPiprazole (Abilify) 5 mg tablet; Take 1 tablet  (5 mg) by mouth once daily.  -     DULoxetine (Cymbalta) 20 mg DR capsule; Take 1 capsule (20 mg) by mouth 2 times a day. Do not crush or chew.  -     Follow Up In Primary Care - Established; Future  Mixed stress and urge urinary incontinence  -     Follow Up In Primary Care - Established; Future  Anxiety  -     mirtazapine (Remeron) 7.5 mg tablet; take 1 tablet by mouth at bedtime on MONDAY and THURSDAY only.  -     Follow Up In Primary Care - Established; Future        Thank you very much for coming.  I am very happy to see you again.  Although you still feel nervous and anxious, you are actually doing much much better!    Yes, let us go ahead and try CYMBALTA.  I read up a little bit more about this medication, and I realized that you will actually benefit from 30 mg by mouth with lunch every day for at least 1 week, then if you are doing well with this medication, we can increase it to 2 tablets / 60 mg by mouth with lunch every day.    Please let me know if the Cymbalta makes you sleepy or if it gives you trouble falling asleep.  Let me know, and we can make some adjustments as well.    Please continue taking your MIRTAZAPINE at bedtime on MONDAY and THURSDAY.    I do understand that you have been off your ARIPIPRAZOLE/ABILIFY for at least 5 days, and you have been well.  Still, let us get you restarted on your generic Abilify.  Take this every day, same time each day.    Please continue taking your DONEPEZIL/ARICEPT at night.    There is more to do, but let us continue without any other changes.    Let us also have you see PSYCHIATRY.  It is always nice to have a second opinion.    I was going to give you the PNEUMONIA vaccination PREVNAR 20, but I cannot at the moment.  Could you please have yourself vaccinated by her local friendly pharmacist?  Start with Prevnar 20, then wait 2 weeks and get yourself vaccinated for INFLUENZA.  Each of these can make you a little tired and achy, but it is an investment and  protection from illness.  Also, Prevnar will be good for your whole life.      Today, we also did your Medicare Wellness evaluation for year 2024.  We reviewed your living will wishes and your CODE STATUS.  We will keep your son, Yoav, as your DURABLE POWER OF  for healthcare matters.  This means of course that if you are unable to make decisions for yourself, Yoav will set in, and he will speak on your behalf, and he will make medical decisions for you.    Your CODE STATUS is FULL CODE.  This means that if you get very sick, we will do everything, including extraordinary means of treatment, to save your life, and to preserve it.  Afterwards, once you are stable, we will discuss with you and your family how you are doing and what your options are.  At the very least, we will try our best to maintain your quality of life and keep you comfortable.      Again, thank you very much for coming.  See you in 6 weeks.  Please do not hesitate to call with questions or concerns.  Please continue to pray for our recovery from this pandemic.    I hope you have a good Thanksgiving, a good [place], and a happy new year!  Also, advanced happy birthday!            0  Return in 6 weeks.  40 minutes please.  Reassess hemodynamics, cardiovascular risk, mood, energy, function.  Possibly coordinate with psychiatry.  Consider weaning aripiprazole, or switching to Vraylar.  Coordinate with home care, cardiology, EP, other specialists as patient is seen.            0

## 2024-11-22 NOTE — ASSESSMENT & PLAN NOTE
Orders:    mirtazapine (Remeron) 7.5 mg tablet; take 1 tablet by mouth at bedtime on MONDAY and THURSDAY only.

## 2024-11-22 NOTE — ASSESSMENT & PLAN NOTE
Orders:    Follow Up In Primary Care - Established    ARIPiprazole (Abilify) 5 mg tablet; Take 1 tablet (5 mg) by mouth once daily.    DULoxetine (Cymbalta) 20 mg DR capsule; Take 1 capsule (20 mg) by mouth 2 times a day. Do not crush or chew.

## 2024-11-22 NOTE — PATIENT INSTRUCTIONS
Thank you very much for coming.  I am very happy to see you again.  Although you still feel nervous and anxious, you are actually doing much much better!    Yes, let us go ahead and try CYMBALTA.  I read up a little bit more about this medication, and I realized that you will actually benefit from 30 mg by mouth with lunch every day for at least 1 week, then if you are doing well with this medication, we can increase it to 2 tablets / 60 mg by mouth with lunch every day.    Please let me know if the Cymbalta makes you sleepy or if it gives you trouble falling asleep.  Let me know, and we can make some adjustments as well.    Please continue taking your MIRTAZAPINE at bedtime on MONDAY and THURSDAY.    I do understand that you have been off your ARIPIPRAZOLE/ABILIFY for at least 5 days, and you have been well.  Still, let us get you restarted on your generic Abilify.  Take this every day, same time each day.    Please continue taking your DONEPEZIL/ARICEPT at night.    There is more to do, but let us continue without any other changes.    Let us also have you see PSYCHIATRY.  It is always nice to have a second opinion.    I was going to give you the PNEUMONIA vaccination PREVNAR 20, but I cannot at the moment.  Could you please have yourself vaccinated by her local friendly pharmacist?  Start with Prevnar 20, then wait 2 weeks and get yourself vaccinated for INFLUENZA.  Each of these can make you a little tired and achy, but it is an investment and protection from illness.  Also, Prevnar will be good for your whole life.      Today, we also did your Medicare Wellness evaluation for year 2024.  We reviewed your living will wishes and your CODE STATUS.  We will keep your son, Yoav, as your DURABLE POWER OF  for healthcare matters.  This means of course that if you are unable to make decisions for yourself, Yoav will set in, and he will speak on your behalf, and he will make medical decisions for you.    Your  CODE STATUS is FULL CODE.  This means that if you get very sick, we will do everything, including extraordinary means of treatment, to save your life, and to preserve it.  Afterwards, once you are stable, we will discuss with you and your family how you are doing and what your options are.  At the very least, we will try our best to maintain your quality of life and keep you comfortable.      Again, thank you very much for coming.  See you in 6 weeks.  Please do not hesitate to call with questions or concerns.  Please continue to pray for our recovery from this pandemic.    I hope you have a good Thanksgiving, a good [place], and a happy new year!  Also, advanced happy birthday!            0  Return in 6 weeks.  40 minutes please.  Reassess hemodynamics, cardiovascular risk, mood, energy, function.  Possibly coordinate with psychiatry.  Consider weaning aripiprazole, or switching to Vraylar.  Coordinate with home care, cardiology, EP, other specialists as patient is seen.            0

## 2024-12-10 ENCOUNTER — TELEPHONE (OUTPATIENT)
Dept: PRIMARY CARE | Facility: CLINIC | Age: 79
End: 2024-12-10
Payer: COMMERCIAL

## 2024-12-10 NOTE — TELEPHONE ENCOUNTER
Pt states Cymbalta is not agreeing with her, she is asking if you would like to change it or would you like her to wait until her next appointment? Please advise.

## 2024-12-17 DIAGNOSIS — Z87.898 HISTORY OF DELIRIUM: ICD-10-CM

## 2024-12-17 DIAGNOSIS — G31.84 MILD COGNITIVE IMPAIRMENT: ICD-10-CM

## 2024-12-17 DIAGNOSIS — I10 ESSENTIAL HYPERTENSION: ICD-10-CM

## 2024-12-17 DIAGNOSIS — F41.8 DEPRESSION WITH ANXIETY: ICD-10-CM

## 2024-12-17 RX ORDER — DONEPEZIL HYDROCHLORIDE 5 MG/1
TABLET, FILM COATED ORAL
Qty: 90 TABLET | Refills: 1 | Status: SHIPPED | OUTPATIENT
Start: 2024-12-17

## 2024-12-17 RX ORDER — LOSARTAN POTASSIUM 50 MG/1
TABLET ORAL
Qty: 90 TABLET | Refills: 1 | Status: SHIPPED | OUTPATIENT
Start: 2024-12-17

## 2024-12-23 DIAGNOSIS — E78.00 PURE HYPERCHOLESTEROLEMIA WITH TARGET LOW DENSITY LIPOPROTEIN (LDL) CHOLESTEROL LESS THAN 70 MG/DL: ICD-10-CM

## 2024-12-23 DIAGNOSIS — F41.8 DEPRESSION WITH ANXIETY: ICD-10-CM

## 2024-12-23 RX ORDER — ROSUVASTATIN CALCIUM 20 MG/1
20 TABLET, COATED ORAL NIGHTLY
Qty: 90 TABLET | Refills: 1 | Status: SHIPPED | OUTPATIENT
Start: 2024-12-23

## 2024-12-23 RX ORDER — ARIPIPRAZOLE 5 MG/1
5 TABLET ORAL DAILY
Qty: 30 TABLET | Refills: 1 | Status: SHIPPED | OUTPATIENT
Start: 2024-12-23 | End: 2025-02-21

## 2025-01-03 ENCOUNTER — APPOINTMENT (OUTPATIENT)
Dept: PRIMARY CARE | Facility: CLINIC | Age: 80
End: 2025-01-03
Payer: COMMERCIAL

## 2025-01-03 DIAGNOSIS — F41.9 ANXIETY: ICD-10-CM

## 2025-01-06 ENCOUNTER — APPOINTMENT (OUTPATIENT)
Dept: CARDIOLOGY | Facility: CLINIC | Age: 80
End: 2025-01-06
Payer: MEDICARE

## 2025-01-06 RX ORDER — MIRTAZAPINE 7.5 MG/1
TABLET, FILM COATED ORAL
Qty: 8 TABLET | Refills: 1 | Status: SHIPPED | OUTPATIENT
Start: 2025-01-06

## 2025-01-13 ENCOUNTER — APPOINTMENT (OUTPATIENT)
Dept: CARDIOLOGY | Facility: CLINIC | Age: 80
End: 2025-01-13
Payer: MEDICARE

## 2025-01-16 DIAGNOSIS — I47.10 PAROXYSMAL SVT (SUPRAVENTRICULAR TACHYCARDIA) (CMS-HCC): ICD-10-CM

## 2025-01-16 RX ORDER — FLECAINIDE ACETATE 50 MG/1
50 TABLET ORAL EVERY 12 HOURS SCHEDULED
Qty: 180 TABLET | Refills: 0 | Status: SHIPPED | OUTPATIENT
Start: 2025-01-16

## 2025-01-20 ENCOUNTER — APPOINTMENT (OUTPATIENT)
Dept: OBSTETRICS AND GYNECOLOGY | Facility: CLINIC | Age: 80
End: 2025-01-20

## 2025-01-20 DIAGNOSIS — G31.84 MILD COGNITIVE IMPAIRMENT: ICD-10-CM

## 2025-01-20 DIAGNOSIS — F41.8 DEPRESSION WITH ANXIETY: ICD-10-CM

## 2025-01-20 DIAGNOSIS — Z87.898 HISTORY OF DELIRIUM: ICD-10-CM

## 2025-01-20 RX ORDER — ARIPIPRAZOLE 5 MG/1
5 TABLET ORAL DAILY
Qty: 30 TABLET | Refills: 1 | Status: SHIPPED | OUTPATIENT
Start: 2025-01-20 | End: 2025-03-21

## 2025-01-27 ENCOUNTER — APPOINTMENT (OUTPATIENT)
Dept: CARDIOLOGY | Facility: CLINIC | Age: 80
End: 2025-01-27
Payer: MEDICARE

## 2025-01-27 VITALS
BODY MASS INDEX: 25.88 KG/M2 | DIASTOLIC BLOOD PRESSURE: 62 MMHG | HEART RATE: 50 BPM | SYSTOLIC BLOOD PRESSURE: 108 MMHG | WEIGHT: 151.6 LBS | HEIGHT: 64 IN

## 2025-01-27 DIAGNOSIS — R53.83 FATIGUE, UNSPECIFIED TYPE: ICD-10-CM

## 2025-01-27 DIAGNOSIS — R00.1 BRADYCARDIA: ICD-10-CM

## 2025-01-27 DIAGNOSIS — I47.10 PAROXYSMAL SVT (SUPRAVENTRICULAR TACHYCARDIA) (CMS-HCC): ICD-10-CM

## 2025-01-27 PROCEDURE — 99214 OFFICE O/P EST MOD 30 MIN: CPT | Performed by: INTERNAL MEDICINE

## 2025-01-27 PROCEDURE — 1123F ACP DISCUSS/DSCN MKR DOCD: CPT | Performed by: INTERNAL MEDICINE

## 2025-01-27 PROCEDURE — G2211 COMPLEX E/M VISIT ADD ON: HCPCS | Performed by: INTERNAL MEDICINE

## 2025-01-27 PROCEDURE — 1159F MED LIST DOCD IN RCRD: CPT | Performed by: INTERNAL MEDICINE

## 2025-01-27 PROCEDURE — 3074F SYST BP LT 130 MM HG: CPT | Performed by: INTERNAL MEDICINE

## 2025-01-27 PROCEDURE — 3078F DIAST BP <80 MM HG: CPT | Performed by: INTERNAL MEDICINE

## 2025-01-27 PROCEDURE — 93000 ELECTROCARDIOGRAM COMPLETE: CPT | Performed by: INTERNAL MEDICINE

## 2025-01-27 RX ORDER — FLECAINIDE ACETATE 50 MG/1
TABLET ORAL
Qty: 90 TABLET | Refills: 1 | Status: SHIPPED | OUTPATIENT
Start: 2025-01-27

## 2025-01-27 RX ORDER — METOPROLOL TARTRATE 25 MG/1
TABLET, FILM COATED ORAL
Qty: 90 TABLET | Refills: 1 | Status: SHIPPED | OUTPATIENT
Start: 2025-01-27

## 2025-01-27 NOTE — PATIENT INSTRUCTIONS
DECREASE: FLECAINIDE 50 MG TO 1 TABLET DAILY ONLY    DECREASE:  METOPROLOL TARTRATE 25 MG TO 1 TABLET DAILY ONLY        DID YOU KNOW  We have a pharmacy here in the [facility].  They can fill all prescriptions, not just cardiac medications.  Prescriptions from other pharmacies can easily be transferred to the  pharmacy by the  pharmacist on site.   pharmacies offer FREE HOME DELIVERY on medications to anywhere in [place]. They can sync your medications. Typically prescriptions can be ready in 10 - 15 minutes. If pharmacy is unable to fill your  prescription or if cost is more than your paying now the Pharmacist can easily transfer back to your Pharmacy of choice. Pharmacy phone # [phone].     Please bring all medicines, vitamins, and herbal supplements with you in original bottles to every appointment! This is the best way to ensure your medication list in your chart is accurate.    Prescriptions will not be filled unless you are compliant with your follow up appointments or have a follow up appointment scheduled as per instruction of your physician. Refills should be requested at the time of your visit.

## 2025-01-27 NOTE — PROGRESS NOTES
Patient:  Theodora Goel  YOB: 1945  MRN: [id number]       Impression/Plan:     Diagnoses and all orders for this visit:  Fatigue, unspecified type  Bradycardia  Paroxysmal SVT (supraventricular tachycardia) (CMS-HCC)  -     She has very rare SVT over the last several months.  -     Her fatigue is clearly related to marked bradycardia  -     He is already on low-dose of both flecainide and beta-blocker.  At her age half-life may be affected and therefore we will drop flecainide to once a day metoprolol to once a day reassess in 3 weeks.  She would need pacemaker support should antiarrhythmic therapy need be pursued versus referral for ablative approach.      Chief Complaint/Active Symptoms:       Theodora Goel is a 79 y.o. female who presents with  SVT, coronary disease based on calcium score 890, hyperlipidemia.  She had had a loop recorder placed for evaluation of SVT and prior atrial fibrillation and on a device check described March 23, 2020 had had episodes of atrial fibrillation.  1 episode lasting for over 3 hours.  Over the subsequent year however burden was felt to be quite low and anticoagulation was not instituted.    Was begun on flecainide May 2024 for bursts of SVT while hospitalized for severe urinary tract infection and confusion.     Echocardiogram 6/2/2022 normal study  March 2021 Lexiscan perfusion study normal         1/8/2024 brain MRI with and without contrast        No evidence of acute infarct, intracranial mass effect or midline  shift.      Mild volume loss. Mild-to-moderate degree of nonspecific white matter  changes compatible with microangiopathy. No abnormal parenchymal enhancement.        1/18/2024 echocardiogram   1. Left ventricular systolic function is normal with a 65% estimated ejection fraction.   2. Spectral Doppler shows an impaired relaxation pattern of left ventricular diastolic filling.   3. There is moderate concentric left ventricular  hypertrophy.   4. Normal chamber sizes.   5. No significant valvular heart disease.     1/18/2414 day event monitor  Impression  1.  Patient triggered events correlated to sinus bradycardia 54-55 bpm.  2.  1 episode of what appears to be accelerated junctional rhythm initially with very short [place] interval followed by a PAC and then junctional rate at 75-80 bpm.  3.  Asymptomatic PVCs identified  4.  No episodes of heart block 5 tracings for review all consistent with sinus bradycardia between 54 and 62 the junctional rhythm was at a rate of 91.    I had seen her last 6/20/2024 blood pressure was controlled.  She had stopped the flecainide because she thought it was causing swelling when actually it was more likely the Celebrex and she was to have follow-up soon thereafter with Dr. Liu.  Recent monitor had shown no evidence of recurrent SVT or A-fib.  She was ordered a 14-day monitor to assure no decrease in heart rate or other dysrhythmia but her insurance apparently would not cover the recommended testing.    August 2024 iron ferritin studies normal.  Cholesterol 153 HDL 90 LDL 48 triglycerides 73 TSH normal CMP normal CBC unremarkable    Has not noticed any rapid heart rates but she is very fatigued.  She has had no chest tightness or pressure.  She is not particularly short of breath minimal edema but very sedentary.  He has not had syncope thyroid testing within the last 5 months normal.  No recent dosage change.    ECG 1/27/2025 marked sinus bradycardia rate 48 first-degree AV block               Review of Systems: Unremarkable except as noted above    Meds     Current Outpatient Medications   Medication Instructions    albuterol 90 mcg/actuation inhaler 2 puffs, inhalation, Every 4 hours PRN    alendronate (FOSAMAX) 70 mg, oral, Every 7 days, Take in the morning with a full glass of water, on an empty stomach, and do not take anything else by mouth or lie down for the next 30 min.    ARIPiprazole (ABILIFY) 5  mg, oral, Daily    aspirin 81 mg, Daily    cholecalciferol (Vitamin D3) 1,250 mcg (50,000 unit) tablet Please take 1 tablet by mouth Sundays, with lunch and a full glass of water.  Thank you.    cyanocobalamin (Vitamin B-12) 1,000 mcg tablet Please take 1 tablet by mouth with breakfast, and again 1 tablet by mouth with lunch.  Do not take with supper, may cause INSOMNIA.  Thank you.    donepezil (Aricept) 5 mg tablet Please take 1 tablet by mouth at bedtime.  Thank you.    flecainide (TAMBOCOR) 50 mg, oral, Every 12 hours scheduled    ketoconazole (NIZOral) 2 % shampoo Topical, 2 times weekly    levothyroxine (Synthroid, Levoxyl) 125 mcg tablet Please take 1 tablet by mouth before breakfast every morning.  Thank you.    losartan (Cozaar) 50 mg tablet Please take 1 tablet by mouth daily.  Same time each day please.  Thank you.    magnesium oxide (Mag-Ox) 250 mg magnesium tablet Please take 1 tablet by mouth with food twice a day.  Always with food.  Watch out for diarrhea, abdominal distress.  Thank you.    metoprolol tartrate (Lopressor) 25 mg tablet Please take 1 tablet by mouth with breakfast, and again 1 tablet by mouth with supper.  Thank you.    mirtazapine (Remeron) 7.5 mg tablet take 1 tablet by mouth at bedtime on MONDAY and THURSDAY only.    multivit with min-folic acid (One-A-Day Women's 50 Plus) 0.4 mg tablet Take by mouth.    rosuvastatin (CRESTOR) 20 mg, oral, Nightly        Allergies   No Known Allergies      Annotated Problems     Specialty Problems          Cardiology Problems    Abnormal ECG    Coronary artery disease     Echocardiogram 6/2/2022 normal study  March 2021 Lexiscan perfusion study normal       coronary disease based on calcium score 890 CT 2020         VARGAS (dyspnea on exertion)    Hyperlipidemia    Paroxysmal atrial fibrillation (Multi)     Loop recorder assessments   -1/24/2023: 112-second episode of tachycardia 1 to     2-second pauses.  One 6-second episode sinus rhythm 42     -December 2022 20 episodes of tachycardia consistent with SVT 7-19 seconds in duration.    -March 2020 several episodes atrial fibrillation one 3 hours in duration         Raynaud's phenomenon    Paroxysmal SVT (supraventricular tachycardia) (CMS-HCC)    Essential hypertension    Pure hypercholesterolemia with target low density lipoprotein (LDL) cholesterol less than 70 mg/dL        Problem List     Patient Active Problem List    Diagnosis Date Noted    Frailty syndrome in geriatric patient 09/06/2024    Sensorineural hearing loss (SNHL) of both ears 09/06/2024    At risk for polypharmacy 09/06/2024    Polyarthralgia 09/06/2024    Hyperglycemia 09/06/2024    Chronic obstructive pulmonary disease (Multi) 09/06/2024    Anemia due to vitamin B12 deficiency 09/06/2024    Compression fracture of body of thoracic vertebra (Multi) 09/06/2024    Pure hypercholesterolemia with target low density lipoprotein (LDL) cholesterol less than 70 mg/dL 09/06/2024    Mild cognitive impairment 09/06/2024    History of delirium 09/06/2024    Essential hypertension 06/20/2024    Bilateral edema of lower extremity 06/20/2024    Fatigue 05/05/2024    Urinary incontinence 05/02/2024    Dizziness 11/14/2023    Ataxic gait 11/14/2023    Paroxysmal SVT (supraventricular tachycardia) (CMS-HCC) 11/14/2023    Long term (current) use of opiate analgesic 06/16/2023    Abnormal CBC 02/03/2023    Abnormal ECG 02/03/2023    Anxiety 02/03/2023    Asthma 02/03/2023    Atypical chest pain 02/03/2023    Atypical mole 02/03/2023    Balance problem 02/03/2023    Coronary artery disease 02/03/2023    Weakness 02/03/2023    Eczema 02/03/2023    Seborrheic dermatitis 02/03/2023    Frequent falls 02/03/2023    Gallstones 02/03/2023    GERD (gastroesophageal reflux disease) 02/03/2023    Hyperlipidemia 02/03/2023    Hypomagnesemia 02/03/2023    Hypothyroidism, adult 02/03/2023    Insomnia 02/03/2023    Left lumbar radiculopathy 02/03/2023    Low back pain  "02/03/2023    Magnesium deficiency 02/03/2023    Depression with anxiety 02/03/2023    Paroxysmal atrial fibrillation (Multi) 02/03/2023    Postural dizziness 02/03/2023    Primary osteoarthritis of knees, bilateral 02/03/2023    Raynaud's phenomenon 02/03/2023    VARGAS (dyspnea on exertion) 02/03/2023    Shortness of breath 02/03/2023    Spine degeneration 02/03/2023    Tinnitus 02/03/2023    Vitamin B12 deficiency 02/03/2023    Vitamin D deficiency 02/03/2023    Weight loss 02/03/2023       Objective:     Vitals:    01/27/25 1059   BP: 108/62   BP Location: Left arm   Patient Position: Sitting   Pulse: 50   Weight: 68.8 kg (151 lb 9.6 oz)   Height: 1.626 m (5' 4\")      Wt Readings from Last 4 Encounters:   01/27/25 68.8 kg (151 lb 9.6 oz)   11/22/24 70.8 kg (156 lb)   09/06/24 76.2 kg (168 lb)   08/07/24 76.6 kg (168 lb 12.8 oz)           LAB:     Lab Results   Component Value Date    WBC 5.0 08/30/2024    HGB 12.2 08/30/2024    HCT 39.1 08/30/2024     08/30/2024    CHOL 153 08/30/2024    TRIG 73 08/30/2024    HDL 90.2 08/30/2024    ALT 11 08/30/2024    AST 17 08/30/2024     08/30/2024    K 4.6 08/30/2024     08/30/2024    CREATININE 0.70 08/30/2024    BUN 25 (H) 08/30/2024    CO2 30 08/30/2024    TSH 2.25 08/30/2024    INR 1.0 05/02/2024    HGBA1C 5.3 08/30/2024       Diagnostic Studies:     ECG 12 Lead    Result Date: 6/3/2024  Sinus rhythm with 1st degree AV block Left axis deviation Abnormal ECG When compared with ECG of 26-JUL-2013 10:06, IA interval has increased Confirmed by Parth Putnam ([id number]) on 6/3/2024 5:40:34 PM    Electrocardiogram, 12-lead PRN ACS symptoms    Result Date: 5/4/2024  Supraventricular tachycardia Left axis deviation Late transition Abnormal ECG When compared with ECG of 02-MAY-2024 19:58, (unconfirmed) IA interval has decreased Vent. rate has increased BY  91 BPM Confirmed by Rubin Deleon ([id number]) on 5/4/2024 3:18:11 PM    XR chest 1 view    Result Date: " 5/2/2024  STUDY: Chest Radiograph;  05/02/2024, 6:55 PM. INDICATION: Altered mental status. COMPARISON: CXR: 01/08/24, 06/24/22, 01/28/22. ACCESSION NUMBER(S): [id number] ORDERING CLINICIAN: WILLIAN MEAD TECHNIQUE:  Frontal chest was obtained at 18:55 hours. FINDINGS: CARDIOMEDIASTINAL SILHOUETTE: Cardiomediastinal silhouette is mildly enlarged but without significant change considering differences in technique and depth of inspiration lower on the current study.  Heart size likely upper limits of recorder device projecting over the left side of the heart.  LUNGS: Relatively low depth of inspiration without focal consolidation or pulmonary edema.  No pleural effusions or pneumothorax.  ABDOMEN: No remarkable upper abdominal findings.  BONES: No acute osseous changes.  Degenerative changes of the glenohumeral joints, and associated ossified loose bodies.  Mild thoracic scoliosis.    No acute pulmonary process. Signed by Shyam Cruz DO    CT head wo IV contrast    Result Date: 5/2/2024  Interpreted By:  Edward Subramanian, STUDY: CT HEAD WO IV CONTRAST;  5/2/2024 7:02 pm   INDICATION: Signs/Symptoms:frequent falls generalized weakness.   COMPARISON: None.   ACCESSION NUMBER(S): [id number]   ORDERING CLINICIAN: ALEJANDRA CROSS   TECHNIQUE: Noncontrast axial CT images of head were obtained with coronal and sagittal reconstructed images.   FINDINGS: BRAIN PARENCHYMA:  No acute intraparenchymal hemorrhage or parenchymal evidence of acute large territory ischemic infarct. No mass-effect. Gray-white matter distinction is preserved.   VENTRICLES and EXTRA-AXIAL SPACES:  No acute extra-axial or intraventricular hemorrhage. No effacement of cerebral sulci. Ventricles and sulci are age-concordant.   PARANASAL SINUSES/MASTOIDS:  No hemorrhage or air-fluid levels within the visualized paranasal sinuses. The mastoids are well aerated.   CALVARIUM/ORBITS:  No skull fracture.  The orbits and globes are intact to the extent  visualized.   EXTRACRANIAL SOFT TISSUES: No discernible abnormality.       1. No acute intracranial abnormality.         MACRO: None.   Signed by: Edward Subramanian 5/2/2024 7:25 PM Dictation workstation:   [id number]        Radiology:     No orders to display       Physical Exam     General Appearance: alert and oriented to person, place and time, in no acute distress  Cardiovascular: Reduced rate rate, regular rhythm, normal S1 and S2, no murmurs, rubs, clicks, or gallops,  no JVD  Pulmonary/Chest: clear to auscultation bilaterally- no wheezes, rales or rhonchi, normal air movement, no respiratory distress  Abdomen: soft, non-tender, non-distended, normal bowel sounds, no masses   Extremities: no cyanosis, clubbing or edema  Skin: warm and dry, no rash or erythema  Eyes: EOMI  Neck: supple and non-tender without mass, no thyromegaly   Neurological: alert, oriented, normal speech, no focal findings or movement disorder noted            Scribe Attestation  By signing my name below, I, Tamara Sanders CMA   , Scribe   attest that this documentation has been prepared under the direction and in the presence of Eric Martins MD.

## 2025-01-31 ENCOUNTER — APPOINTMENT (OUTPATIENT)
Dept: PRIMARY CARE | Facility: CLINIC | Age: 80
End: 2025-01-31
Payer: COMMERCIAL

## 2025-01-31 VITALS
HEART RATE: 77 BPM | DIASTOLIC BLOOD PRESSURE: 66 MMHG | OXYGEN SATURATION: 95 % | SYSTOLIC BLOOD PRESSURE: 124 MMHG | BODY MASS INDEX: 25.64 KG/M2 | HEIGHT: 64 IN | WEIGHT: 150.2 LBS

## 2025-01-31 DIAGNOSIS — I10 ESSENTIAL HYPERTENSION: ICD-10-CM

## 2025-01-31 DIAGNOSIS — M25.50 POLYARTHRALGIA: ICD-10-CM

## 2025-01-31 DIAGNOSIS — E55.9 VITAMIN D DEFICIENCY: ICD-10-CM

## 2025-01-31 DIAGNOSIS — L30.9 ECZEMA, UNSPECIFIED TYPE: ICD-10-CM

## 2025-01-31 DIAGNOSIS — H90.3 SENSORINEURAL HEARING LOSS (SNHL) OF BOTH EARS: ICD-10-CM

## 2025-01-31 DIAGNOSIS — R54 FRAILTY SYNDROME IN GERIATRIC PATIENT: ICD-10-CM

## 2025-01-31 DIAGNOSIS — F41.9 ANXIETY: ICD-10-CM

## 2025-01-31 DIAGNOSIS — D51.9 ANEMIA DUE TO VITAMIN B12 DEFICIENCY, UNSPECIFIED B12 DEFICIENCY TYPE: ICD-10-CM

## 2025-01-31 DIAGNOSIS — R26.0 ATAXIC GAIT: ICD-10-CM

## 2025-01-31 DIAGNOSIS — Z91.89 AT RISK FOR POLYPHARMACY: ICD-10-CM

## 2025-01-31 DIAGNOSIS — E83.42 HYPOMAGNESEMIA: ICD-10-CM

## 2025-01-31 DIAGNOSIS — R60.0 BILATERAL EDEMA OF LOWER EXTREMITY: ICD-10-CM

## 2025-01-31 DIAGNOSIS — S22.000A COMPRESSION FRACTURE OF BODY OF THORACIC VERTEBRA (MULTI): ICD-10-CM

## 2025-01-31 DIAGNOSIS — G31.84 MILD COGNITIVE IMPAIRMENT: ICD-10-CM

## 2025-01-31 DIAGNOSIS — I47.10 PAROXYSMAL SVT (SUPRAVENTRICULAR TACHYCARDIA) (CMS-HCC): ICD-10-CM

## 2025-01-31 DIAGNOSIS — R73.9 HYPERGLYCEMIA: ICD-10-CM

## 2025-01-31 DIAGNOSIS — N39.46 MIXED STRESS AND URGE URINARY INCONTINENCE: ICD-10-CM

## 2025-01-31 DIAGNOSIS — F41.8 DEPRESSION WITH ANXIETY: ICD-10-CM

## 2025-01-31 DIAGNOSIS — Z87.898 HISTORY OF DELIRIUM: ICD-10-CM

## 2025-01-31 DIAGNOSIS — R29.6 FREQUENT FALLS: ICD-10-CM

## 2025-01-31 DIAGNOSIS — E78.00 PURE HYPERCHOLESTEROLEMIA WITH TARGET LOW DENSITY LIPOPROTEIN (LDL) CHOLESTEROL LESS THAN 70 MG/DL: ICD-10-CM

## 2025-01-31 DIAGNOSIS — J44.9 CHRONIC OBSTRUCTIVE PULMONARY DISEASE, UNSPECIFIED COPD TYPE (MULTI): ICD-10-CM

## 2025-01-31 PROCEDURE — 1123F ACP DISCUSS/DSCN MKR DOCD: CPT | Performed by: INTERNAL MEDICINE

## 2025-01-31 PROCEDURE — 3078F DIAST BP <80 MM HG: CPT | Performed by: INTERNAL MEDICINE

## 2025-01-31 PROCEDURE — 3074F SYST BP LT 130 MM HG: CPT | Performed by: INTERNAL MEDICINE

## 2025-01-31 PROCEDURE — 99213 OFFICE O/P EST LOW 20 MIN: CPT | Performed by: INTERNAL MEDICINE

## 2025-01-31 PROCEDURE — 1160F RVW MEDS BY RX/DR IN RCRD: CPT | Performed by: INTERNAL MEDICINE

## 2025-01-31 PROCEDURE — 1036F TOBACCO NON-USER: CPT | Performed by: INTERNAL MEDICINE

## 2025-01-31 PROCEDURE — 1159F MED LIST DOCD IN RCRD: CPT | Performed by: INTERNAL MEDICINE

## 2025-01-31 RX ORDER — ALENDRONATE SODIUM 70 MG/1
70 TABLET ORAL
Qty: 13 TABLET | Refills: 1 | Status: SHIPPED | OUTPATIENT
Start: 2025-01-31 | End: 2025-08-01

## 2025-01-31 RX ORDER — IBUPROFEN 600 MG/1
600 TABLET ORAL EVERY 6 HOURS PRN
COMMUNITY

## 2025-01-31 RX ORDER — MIRTAZAPINE 7.5 MG/1
TABLET, FILM COATED ORAL
Qty: 90 TABLET | Refills: 0 | Status: SHIPPED | OUTPATIENT
Start: 2025-01-31

## 2025-01-31 RX ORDER — ARIPIPRAZOLE 5 MG/1
5 TABLET ORAL DAILY
Qty: 90 TABLET | Refills: 0 | Status: SHIPPED | OUTPATIENT
Start: 2025-01-31 | End: 2025-05-01

## 2025-01-31 NOTE — PATIENT INSTRUCTIONS
Thank you for much for coming.  It is always nice to see you, and Yoav!    I do understand that you have been feeling tired.  Dr. Martins did identify that your blood pressure and heart rate were low, and he got a chance to make the necessary adjustments.    Please drink lots of fluids throughout the day.  This will help you start getting better faster, and will also keep your kidneys healthy.    Also, please continue to stay physically active.  This will prevent you from getting stiff.  I am very happy to hear that you are doing virtual PHYSICAL THERAPY!  I am sorry I did not think of that!    Please go ahead and take your MIRTAZAPINE with SUPPER every evening.  Watch out for excessive sleepiness especially during the daytime.    Please continue taking your generic Abilify/ARIPIPRAZOLE as well.  I am happy that you are staying independent!    When you get a chance, go ahead and do some FASTING laboratory examinations, blood and urine.  You can have these done at any  facility, including the [facility], the [facility], or the [facility] facility.  The orders will be waiting for you there.    Please come back in 2 months, so that we can do your Medicare Wellness evaluation.  Until then, please do not hesitate to call or text with questions or concerns.  I do appreciate that you trust me with your care.  I hope you continue to get stronger, especially when springtime comes!    Please continue taking care of yourself and your family, and please continue to pray for our recovery from this pandemic.  Take care and God bless.            0  Return in 2 months.  40 minutes please.  Medicare Wellness evaluation for 2025.  Review fasting laboratory results.  Coordinate with cardiology, EP, physical therapy, as patient is seen.  Consider psychiatry if needed.  Reassess mood, energy, function, review preventive strategies, cardiovascular risk.  Prioritize issues.  Reassess fatigue.            0

## 2025-01-31 NOTE — PROGRESS NOTES
"Subjective   Patient ID: Theodora Goel is a 79 y.o. female who presents for Follow-up (Patient presented today for a 6 week follow up./).    HPI     Review of Systems    Objective   /66 (BP Location: Left arm, Patient Position: Sitting, BP Cuff Size: Adult)   Pulse 77   Ht 1.626 m (5' 4\")   Wt 68.1 kg (150 lb 3.2 oz)   SpO2 95%   BMI 25.78 kg/m²     Physical Exam    Assessment/Plan   Diagnoses and all orders for this visit:  Frailty syndrome in geriatric patient  -     Follow Up In Primary Care - Established  -     Follow Up In Primary Care - Established; Future  Frequent falls  -     Follow Up In Primary Care - Established; Future  Sensorineural hearing loss (SNHL) of both ears  -     Follow Up In Primary Care - Established; Future  At risk for polypharmacy  -     Follow Up In Primary Care - Established; Future  Ataxic gait  -     Follow Up In Primary Care - Established; Future  Polyarthralgia  -     Follow Up In Primary Care - Established; Future  Bilateral edema of lower extremity  -     Follow Up In Primary Care - Established; Future  Essential hypertension  -     Follow Up In Primary Care - Established; Future  Hyperglycemia  -     Follow Up In Primary Care - Established; Future  Paroxysmal SVT (supraventricular tachycardia) (CMS-HCC)  -     Follow Up In Primary Care - Established; Future  Chronic obstructive pulmonary disease, unspecified COPD type (Multi)  -     Follow Up In Primary Care - Established; Future  Anemia due to vitamin B12 deficiency, unspecified B12 deficiency type  -     Follow Up In Primary Care - Established; Future  Hypomagnesemia  -     Follow Up In Primary Care - Established; Future  Vitamin D deficiency  -     Follow Up In Primary Care - Established; Future  Eczema, unspecified type  -     Follow Up In Primary Care - Established; Future  Compression fracture of body of thoracic vertebra (Multi)  -     alendronate (Fosamax) 70 mg tablet; Take 1 tablet (70 mg) by mouth " every 7 days. Take in the morning with a full glass of water, on an empty stomach, and do not take anything else by mouth or lie down for the next 30 min.  -     Follow Up In Primary Care - Established; Future  Pure hypercholesterolemia with target low density lipoprotein (LDL) cholesterol less than 70 mg/dL  -     Follow Up In Primary Care - Established; Future  Mild cognitive impairment  -     Follow Up In Primary Care - Established; Future  History of delirium  -     Follow Up In Primary Care - Established; Future  Depression with anxiety  -     Follow Up In Primary Care - Established; Future  -     ARIPiprazole (Abilify) 5 mg tablet; Take 1 tablet (5 mg) by mouth once daily.  Mixed stress and urge urinary incontinence  -     Follow Up In Primary Care - Established; Future  Anxiety  -     Follow Up In Primary Care - Established; Future  -     mirtazapine (Remeron) 7.5 mg tablet; Please take 1 tablet by mouth every evening at bedtime.  Thank you.       Thank you for much for coming.  It is always nice to see you, and Yoav!    I do understand that you have been feeling tired.  Dr. Matrins did identify that your blood pressure and heart rate were low, and he got a chance to make the necessary adjustments.    Please drink lots of fluids throughout the day.  This will help you start getting better faster, and will also keep your kidneys healthy.    Also, please continue to stay physically active.  This will prevent you from getting stiff.  I am very happy to hear that you are doing virtual PHYSICAL THERAPY!  I am sorry I did not think of that!    Please go ahead and take your MIRTAZAPINE with SUPPER every evening.  Watch out for excessive sleepiness especially during the daytime.    Please continue taking your generic Abilify/ARIPIPRAZOLE as well.  I am happy that you are staying independent!    When you get a chance, go ahead and do some FASTING laboratory examinations, blood and urine.  You can have these done at any   facility, including the [facility] facility, the [facility] facility, or the [facility] facility.  The orders will be waiting for you there.    Please come back in 2 months, so that we can do your Medicare Wellness evaluation.  Until then, please do not hesitate to call or text with questions or concerns.  I do appreciate that you trust me with your care.  I hope you continue to get stronger, especially when springtime comes!    Please continue taking care of yourself and your family, and please continue to pray for our recovery from this pandemic.  Take care and God bless.            0  Return in 2 months.  40 minutes please.  Medicare Wellness evaluation for 2025.  Review fasting laboratory results.  Coordinate with cardiology, EP, physical therapy, as patient is seen.  Consider psychiatry if needed.  Reassess mood, energy, function, review preventive strategies, cardiovascular risk.  Prioritize issues.  Reassess fatigue.            0  Respectfully refusing vaccinations at this time, waiting for her insurance to be instated.            0

## 2025-02-03 ENCOUNTER — TELEPHONE (OUTPATIENT)
Dept: PRIMARY CARE | Facility: CLINIC | Age: 80
End: 2025-02-03

## 2025-02-04 DIAGNOSIS — J30.89 NON-SEASONAL ALLERGIC RHINITIS, UNSPECIFIED TRIGGER: Primary | ICD-10-CM

## 2025-02-04 RX ORDER — MINERAL OIL
180 ENEMA (ML) RECTAL DAILY
Qty: 90 TABLET | Refills: 0 | Status: SHIPPED | OUTPATIENT
Start: 2025-02-04 | End: 2026-02-04

## 2025-02-04 RX ORDER — FLUTICASONE PROPIONATE 50 MCG
SPRAY, SUSPENSION (ML) NASAL
Qty: 16 G | Refills: 11 | Status: SHIPPED | OUTPATIENT
Start: 2025-02-04

## 2025-02-23 DIAGNOSIS — R53.83 FATIGUE, UNSPECIFIED TYPE: ICD-10-CM

## 2025-02-23 DIAGNOSIS — D51.9 ANEMIA DUE TO VITAMIN B12 DEFICIENCY, UNSPECIFIED B12 DEFICIENCY TYPE: ICD-10-CM

## 2025-02-25 RX ORDER — LANOLIN ALCOHOL/MO/W.PET/CERES
CREAM (GRAM) TOPICAL
Qty: 90 TABLET | Refills: 3 | Status: SHIPPED | OUTPATIENT
Start: 2025-02-25

## 2025-02-26 ENCOUNTER — APPOINTMENT (OUTPATIENT)
Dept: CARDIOLOGY | Facility: CLINIC | Age: 80
End: 2025-02-26
Payer: COMMERCIAL

## 2025-02-26 VITALS
DIASTOLIC BLOOD PRESSURE: 84 MMHG | SYSTOLIC BLOOD PRESSURE: 148 MMHG | BODY MASS INDEX: 25.71 KG/M2 | HEIGHT: 64 IN | HEART RATE: 70 BPM | WEIGHT: 150.6 LBS

## 2025-02-26 DIAGNOSIS — I47.10 PAROXYSMAL SVT (SUPRAVENTRICULAR TACHYCARDIA) (CMS-HCC): ICD-10-CM

## 2025-02-26 DIAGNOSIS — R00.1 BRADYCARDIA: ICD-10-CM

## 2025-02-26 DIAGNOSIS — R53.83 FATIGUE, UNSPECIFIED TYPE: ICD-10-CM

## 2025-02-26 DIAGNOSIS — I10 PRIMARY HYPERTENSION: ICD-10-CM

## 2025-02-26 PROCEDURE — 99214 OFFICE O/P EST MOD 30 MIN: CPT | Performed by: INTERNAL MEDICINE

## 2025-02-26 PROCEDURE — 93000 ELECTROCARDIOGRAM COMPLETE: CPT | Performed by: INTERNAL MEDICINE

## 2025-02-26 PROCEDURE — 1159F MED LIST DOCD IN RCRD: CPT | Performed by: INTERNAL MEDICINE

## 2025-02-26 PROCEDURE — 3079F DIAST BP 80-89 MM HG: CPT | Performed by: INTERNAL MEDICINE

## 2025-02-26 PROCEDURE — 3077F SYST BP >= 140 MM HG: CPT | Performed by: INTERNAL MEDICINE

## 2025-02-26 PROCEDURE — 1123F ACP DISCUSS/DSCN MKR DOCD: CPT | Performed by: INTERNAL MEDICINE

## 2025-02-26 RX ORDER — LOSARTAN POTASSIUM 100 MG/1
100 TABLET ORAL DAILY
Qty: 90 TABLET | Refills: 3 | Status: SHIPPED | OUTPATIENT
Start: 2025-02-26 | End: 2026-02-26

## 2025-02-26 NOTE — PROGRESS NOTES
Patient:  Theodora Goel  YOB: 1945  MRN: [id number]       Impression/Plan:     Diagnoses and all orders for this visit:  Bradycardia  Fatigue, unspecified type  Paroxysmal SVT (supraventricular tachycardia) (CMS-HCC)  -     With reduction of flecainide Lopressor to daily only bradycardia has resolved as has the fatigue.  Blood pressure slightly elevated as noted below.  -      On the lower dosage flecainide he has had no recurrent SVT we will continue to monitor for recurrence.  Should there be recurrence would consider an ablative approach versus Tikosyn.  -     ECG 12 lead (Clinic Performed)  -     Follow Up In Cardiology; Future  Primary hypertension  -     Suboptimal control and will increase losartan to 100 mg daily with laboratory studies in 1 to 2 weeks to assure stability.  -     losartan (Cozaar) 100 mg tablet; Take 1 tablet (100 mg) by mouth once daily.  -     Basic metabolic panel; Future      Chief Complaint/Active Symptoms:       Theodora Goel is a 79 y.o. female who presents with SVT, coronary disease based on calcium score 890, hyperlipidemia.  She had had a loop recorder placed for evaluation of SVT and prior atrial fibrillation and on a device check described March 23, 2020 had had episodes of atrial fibrillation.  1 episode lasting for over 3 hours.  Over the subsequent year however burden was felt to be quite low and anticoagulation was not instituted.     Was begun on flecainide May 2024 for bursts of SVT while hospitalized for severe urinary tract infection and confusion.     Echocardiogram 6/2/2022 normal study  March 2021 Lexiscan perfusion study normal          1/8/2024 brain MRI with and without contrast        No evidence of acute infarct, intracranial mass effect or midline  shift.      Mild volume loss. Mild-to-moderate degree of nonspecific white matter  changes compatible with microangiopathy. No abnormal parenchymal enhancement.        1/18/2024  echocardiogram   1. Left ventricular systolic function is normal with a 65% estimated ejection fraction.   2. Spectral Doppler shows an impaired relaxation pattern of left ventricular diastolic filling.   3. There is moderate concentric left ventricular hypertrophy.   4. Normal chamber sizes.   5. No significant valvular heart disease..      ECG 2/26/2025 normal sinus rhythm left anterior hemiblock otherwise unremarkable QTc 453 ms    I had seen her 1/27/2025 she described bradycardia fatigue.  Her fatigue was felt clearly related to marked bradycardia noted on monitoring that showed her fatigue to be associated with bradycardia in the low 50s.  PVCs were asymptomatic.  It was elected to reduce and metoprolol to daily.    She denies angina, dyspnea, palpitation, edema, lightheadedness or syncope.  She has had no symptoms of claudication or neurologic deterioration.  There have been no hospitalizations or emergency room visits since last office visit.    With her heart rate now 70 he says she is much less fatigued she is back to her baseline.  No dizziness or chest pain.  She is comfortable at her current level of activity.  She is concerned that she is noted her blood pressure to be higher which is related to reduction of her beta-blocker at prior office visit.  She notes no lower extremity edema.  She does use ibuprofen regularly for arthritis.               Review of Systems: Unremarkable except as noted above    Meds     Current Outpatient Medications   Medication Instructions    albuterol 90 mcg/actuation inhaler 2 puffs, inhalation, Every 4 hours PRN    alendronate (FOSAMAX) 70 mg, oral, Every 7 days, Take in the morning with a full glass of water, on an empty stomach, and do not take anything else by mouth or lie down for the next 30 min.    ARIPiprazole (ABILIFY) 5 mg, oral, Daily    aspirin 81 mg, Daily    cholecalciferol (Vitamin D3) 1,250 mcg (50,000 unit) tablet Please take 1 tablet by mouth Sundays, with  lunch and a full glass of water.  Thank you.    cyanocobalamin (Vitamin B-12) 1,000 mcg tablet Please take 1 tablet by mouth with LUNCH every day.  Thank you.    donepezil (Aricept) 5 mg tablet Please take 1 tablet by mouth at bedtime.  Thank you.    fexofenadine (ALLEGRA) 180 mg, oral, Daily    flecainide (Tambocor) 50 mg tablet 1 TABLET DAILY    fluticasone (Flonase) 50 mcg/actuation nasal spray Use 1 spray to each nostril after beakfast-time and again after suppertime.  Shake gently. Before first use, prime pump. After use, clean tip and replace cap.    ibuprofen (IBU) 600 mg, Every 6 hours PRN    levothyroxine (Synthroid, Levoxyl) 125 mcg tablet Please take 1 tablet by mouth before breakfast every morning.  Thank you.    losartan (Cozaar) 50 mg tablet Please take 1 tablet by mouth daily.  Same time each day please.  Thank you.    magnesium oxide (Mag-Ox) 250 mg magnesium tablet Please take 1 tablet by mouth with food twice a day.  Always with food.  Watch out for diarrhea, abdominal distress.  Thank you.    metoprolol tartrate (Lopressor) 25 mg tablet 1 TABLET DAILY    mirtazapine (Remeron) 7.5 mg tablet Please take 1 tablet by mouth every evening at bedtime.  Thank you.    multivit with min-folic acid (One-A-Day Women's 50 Plus) 0.4 mg tablet Take by mouth.    rosuvastatin (CRESTOR) 20 mg, oral, Nightly        Allergies   No Known Allergies      Annotated Problems     Specialty Problems          Cardiology Problems    Abnormal ECG    Coronary artery disease     Echocardiogram 6/2/2022 normal study  March 2021 Lexiscan perfusion study normal       coronary disease based on calcium score 890 CT 2020         VARGAS (dyspnea on exertion)    Hyperlipidemia    Paroxysmal atrial fibrillation (Multi)     Loop recorder assessments   -1/24/2023: 112-second episode of tachycardia 1 to     2-second pauses.  One 6-second episode sinus rhythm 42 -December 2022 20 episodes of tachycardia consistent with SVT 7-19 seconds in  duration.    -March 2020 several episodes atrial fibrillation one 3 hours in duration         Raynaud's phenomenon    Paroxysmal SVT (supraventricular tachycardia) (CMS-HCC)    Essential hypertension    Pure hypercholesterolemia with target low density lipoprotein (LDL) cholesterol less than 70 mg/dL    Bradycardia        Problem List     Patient Active Problem List    Diagnosis Date Noted    Bradycardia 01/27/2025    Frailty syndrome in geriatric patient 09/06/2024    Sensorineural hearing loss (SNHL) of both ears 09/06/2024    At risk for polypharmacy 09/06/2024    Polyarthralgia 09/06/2024    Hyperglycemia 09/06/2024    Chronic obstructive pulmonary disease (Multi) 09/06/2024    Anemia due to vitamin B12 deficiency 09/06/2024    Compression fracture of body of thoracic vertebra (Multi) 09/06/2024    Pure hypercholesterolemia with target low density lipoprotein (LDL) cholesterol less than 70 mg/dL 09/06/2024    Mild cognitive impairment 09/06/2024    History of delirium 09/06/2024    Essential hypertension 06/20/2024    Bilateral edema of lower extremity 06/20/2024    Fatigue 05/05/2024    Urinary incontinence 05/02/2024    Dizziness 11/14/2023    Ataxic gait 11/14/2023    Paroxysmal SVT (supraventricular tachycardia) (CMS-HCC) 11/14/2023    Long term (current) use of opiate analgesic 06/16/2023    Abnormal CBC 02/03/2023    Abnormal ECG 02/03/2023    Anxiety 02/03/2023    Asthma 02/03/2023    Atypical chest pain 02/03/2023    Atypical mole 02/03/2023    Balance problem 02/03/2023    Coronary artery disease 02/03/2023    Weakness 02/03/2023    Eczema 02/03/2023    Seborrheic dermatitis 02/03/2023    Frequent falls 02/03/2023    Gallstones 02/03/2023    GERD (gastroesophageal reflux disease) 02/03/2023    Hyperlipidemia 02/03/2023    Hypomagnesemia 02/03/2023    Hypothyroidism, adult 02/03/2023    Insomnia 02/03/2023    Left lumbar radiculopathy 02/03/2023    Low back pain 02/03/2023    Magnesium deficiency  "02/03/2023    Depression with anxiety 02/03/2023    Paroxysmal atrial fibrillation (Multi) 02/03/2023    Postural dizziness 02/03/2023    Primary osteoarthritis of knees, bilateral 02/03/2023    Raynaud's phenomenon 02/03/2023    VARGAS (dyspnea on exertion) 02/03/2023    Shortness of breath 02/03/2023    Spine degeneration 02/03/2023    Tinnitus 02/03/2023    Vitamin B12 deficiency 02/03/2023    Vitamin D deficiency 02/03/2023    Weight loss 02/03/2023       Objective:     Vitals:    02/26/25 1023   BP: 148/84   BP Location: Left arm   Patient Position: Sitting   Pulse: 70   Weight: 68.3 kg (150 lb 9.6 oz)   Height: 1.626 m (5' 4\")      Wt Readings from Last 4 Encounters:   02/26/25 68.3 kg (150 lb 9.6 oz)   01/31/25 68.1 kg (150 lb 3.2 oz)   01/27/25 68.8 kg (151 lb 9.6 oz)   11/22/24 70.8 kg (156 lb)           LAB:     Lab Results   Component Value Date    WBC 5.0 08/30/2024    HGB 12.2 08/30/2024    HCT 39.1 08/30/2024     08/30/2024    CHOL 153 08/30/2024    TRIG 73 08/30/2024    HDL 90.2 08/30/2024    ALT 11 08/30/2024    AST 17 08/30/2024     08/30/2024    K 4.6 08/30/2024     08/30/2024    CREATININE 0.70 08/30/2024    BUN 25 (H) 08/30/2024    CO2 30 08/30/2024    TSH 2.25 08/30/2024    INR 1.0 05/02/2024    HGBA1C 5.3 08/30/2024       Diagnostic Studies:     ECG 12 Lead    Result Date: 6/3/2024  Sinus rhythm with 1st degree AV block Left axis deviation Abnormal ECG When compared with ECG of 26-JUL-2013 10:06, SC interval has increased Confirmed by Parth Putnam ([id number]) on 6/3/2024 5:40:34 PM    Electrocardiogram, 12-lead PRN ACS symptoms    Result Date: 5/4/2024  Supraventricular tachycardia Left axis deviation Late transition Abnormal ECG When compared with ECG of 02-MAY-2024 19:58, (unconfirmed) SC interval has decreased Vent. rate has increased BY  91 BPM Confirmed by Rubin Deleon ([id number]) on 5/4/2024 3:18:11 PM    XR chest 1 view    Result Date: 5/2/2024  STUDY: Chest Radiograph;  " 05/02/2024, 6:55 PM. INDICATION: Altered mental status. COMPARISON: CXR: 01/08/24, 06/24/22, 01/28/22. ACCESSION NUMBER(S): [id number] ORDERING CLINICIAN: WILLIAN MEAD TECHNIQUE:  Frontal chest was obtained at 18:55 hours. FINDINGS: CARDIOMEDIASTINAL SILHOUETTE: Cardiomediastinal silhouette is mildly enlarged but without significant change considering differences in technique and depth of inspiration lower on the current study.  Heart size likely upper limits of recorder device projecting over the left side of the heart.  LUNGS: Relatively low depth of inspiration without focal consolidation or pulmonary edema.  No pleural effusions or pneumothorax.  ABDOMEN: No remarkable upper abdominal findings.  BONES: No acute osseous changes.  Degenerative changes of the glenohumeral joints, and associated ossified loose bodies.  Mild thoracic scoliosis.    No acute pulmonary process. Signed by Shyam Cruz,     CT head wo IV contrast    Result Date: 5/2/2024  Interpreted By:  Edward Subramanian, STUDY: CT HEAD WO IV CONTRAST;  5/2/2024 7:02 pm   INDICATION: Signs/Symptoms:frequent falls generalized weakness.   COMPARISON: None.   ACCESSION NUMBER(S): [id number]   ORDERING CLINICIAN: ALEJANDRA CROSS   TECHNIQUE: Noncontrast axial CT images of head were obtained with coronal and sagittal reconstructed images.   FINDINGS: BRAIN PARENCHYMA:  No acute intraparenchymal hemorrhage or parenchymal evidence of acute large territory ischemic infarct. No mass-effect. Gray-white matter distinction is preserved.   VENTRICLES and EXTRA-AXIAL SPACES:  No acute extra-axial or intraventricular hemorrhage. No effacement of cerebral sulci. Ventricles and sulci are age-concordant.   PARANASAL SINUSES/MASTOIDS:  No hemorrhage or air-fluid levels within the visualized paranasal sinuses. The mastoids are well aerated.   CALVARIUM/ORBITS:  No skull fracture.  The orbits and globes are intact to the extent visualized.   EXTRACRANIAL SOFT TISSUES:  No discernible abnormality.       1. No acute intracranial abnormality.         MACRO: None.   Signed by: Edward Subramanian 5/2/2024 7:25 PM Dictation workstation:   [id number]        Radiology:     No orders to display       Physical Exam     General Appearance: alert and oriented to person, place and time, in no acute distress  Cardiovascular: normal rate, regular rhythm, normal S1 and S2, no murmurs, rubs, clicks, or gallops,  no JVD  Pulmonary/Chest: clear to auscultation bilaterally- no wheezes, rales or rhonchi, normal air movement, no respiratory distress  Abdomen: soft, non-tender, non-distended, normal bowel sounds, no masses   Extremities: no cyanosis, clubbing or edema  Skin: warm and dry, no rash or erythema  Eyes: EOMI  Neck: supple and non-tender without mass, no thyromegaly   Neurological: alert, oriented, normal speech, no focal findings or movement disorder noted

## 2025-02-26 NOTE — PATIENT INSTRUCTIONS
4 month follow up appointment      Increase losartan to 100mg once daily     Have labs done in 2 weeks    Limit IBUprofen and use Aleve instead but use acetaminophen mainly for pain    DID YOU KNOW  We have a pharmacy here in the [facility].  They can fill all prescriptions, not just cardiac medications.  Prescriptions from other pharmacies can easily be transferred to the  pharmacy by the  pharmacist on site.   pharmacies offer FREE HOME DELIVERY on medications to anywhere in [place]. They can sync your medications. Typically prescriptions can be ready in 10 - 15 minutes. If pharmacy is unable to fill your  prescription or if cost is more than your paying now the Pharmacist can easily transfer back to your Pharmacy of choice. Pharmacy phone # [phone].     Please bring all medicines, vitamins, and herbal supplements with you in original bottles to every appointment  Prescriptions will not be filled unless you are compliant with your follow up appointments or have a follow up appointment scheduled as per instruction of your physician. Refills should be requested at the time of your visit.

## 2025-03-12 DIAGNOSIS — I10 PRIMARY HYPERTENSION: ICD-10-CM

## 2025-04-04 ENCOUNTER — APPOINTMENT (OUTPATIENT)
Dept: PRIMARY CARE | Facility: CLINIC | Age: 80
End: 2025-04-04

## 2025-04-04 VITALS
WEIGHT: 150 LBS | HEIGHT: 66 IN | SYSTOLIC BLOOD PRESSURE: 141 MMHG | HEART RATE: 57 BPM | BODY MASS INDEX: 24.11 KG/M2 | DIASTOLIC BLOOD PRESSURE: 65 MMHG

## 2025-04-04 DIAGNOSIS — I48.0 PAROXYSMAL ATRIAL FIBRILLATION (MULTI): ICD-10-CM

## 2025-04-04 DIAGNOSIS — R73.9 HYPERGLYCEMIA: ICD-10-CM

## 2025-04-04 DIAGNOSIS — E55.9 VITAMIN D DEFICIENCY: ICD-10-CM

## 2025-04-04 DIAGNOSIS — N39.46 MIXED STRESS AND URGE URINARY INCONTINENCE: ICD-10-CM

## 2025-04-04 DIAGNOSIS — H90.3 SENSORINEURAL HEARING LOSS (SNHL) OF BOTH EARS: ICD-10-CM

## 2025-04-04 DIAGNOSIS — I47.10 PAROXYSMAL SVT (SUPRAVENTRICULAR TACHYCARDIA) (CMS-HCC): ICD-10-CM

## 2025-04-04 DIAGNOSIS — Z91.89 AT RISK FOR POLYPHARMACY: ICD-10-CM

## 2025-04-04 DIAGNOSIS — R26.0 ATAXIC GAIT: ICD-10-CM

## 2025-04-04 DIAGNOSIS — I10 ESSENTIAL HYPERTENSION: ICD-10-CM

## 2025-04-04 DIAGNOSIS — J44.9 CHRONIC OBSTRUCTIVE PULMONARY DISEASE, UNSPECIFIED COPD TYPE (MULTI): ICD-10-CM

## 2025-04-04 DIAGNOSIS — F33.1 MAJOR DEPRESSIVE DISORDER, RECURRENT, MODERATE: Primary | ICD-10-CM

## 2025-04-04 DIAGNOSIS — D51.9 ANEMIA DUE TO VITAMIN B12 DEFICIENCY, UNSPECIFIED B12 DEFICIENCY TYPE: ICD-10-CM

## 2025-04-04 DIAGNOSIS — E83.42 HYPOMAGNESEMIA: ICD-10-CM

## 2025-04-04 DIAGNOSIS — G31.84 MILD COGNITIVE IMPAIRMENT: ICD-10-CM

## 2025-04-04 DIAGNOSIS — M25.50 POLYARTHRALGIA: ICD-10-CM

## 2025-04-04 DIAGNOSIS — S22.000A COMPRESSION FRACTURE OF BODY OF THORACIC VERTEBRA (MULTI): ICD-10-CM

## 2025-04-04 DIAGNOSIS — E78.00 PURE HYPERCHOLESTEROLEMIA WITH TARGET LOW DENSITY LIPOPROTEIN (LDL) CHOLESTEROL LESS THAN 70 MG/DL: ICD-10-CM

## 2025-04-04 DIAGNOSIS — R60.0 BILATERAL EDEMA OF LOWER EXTREMITY: ICD-10-CM

## 2025-04-04 DIAGNOSIS — R54 FRAILTY SYNDROME IN GERIATRIC PATIENT: ICD-10-CM

## 2025-04-04 DIAGNOSIS — Z87.898 HISTORY OF DELIRIUM: ICD-10-CM

## 2025-04-04 PROCEDURE — 3077F SYST BP >= 140 MM HG: CPT | Performed by: INTERNAL MEDICINE

## 2025-04-04 PROCEDURE — 1160F RVW MEDS BY RX/DR IN RCRD: CPT | Performed by: INTERNAL MEDICINE

## 2025-04-04 PROCEDURE — 1159F MED LIST DOCD IN RCRD: CPT | Performed by: INTERNAL MEDICINE

## 2025-04-04 PROCEDURE — 99213 OFFICE O/P EST LOW 20 MIN: CPT | Performed by: INTERNAL MEDICINE

## 2025-04-04 PROCEDURE — G2211 COMPLEX E/M VISIT ADD ON: HCPCS | Performed by: INTERNAL MEDICINE

## 2025-04-04 PROCEDURE — 1036F TOBACCO NON-USER: CPT | Performed by: INTERNAL MEDICINE

## 2025-04-04 PROCEDURE — 1123F ACP DISCUSS/DSCN MKR DOCD: CPT | Performed by: INTERNAL MEDICINE

## 2025-04-04 PROCEDURE — 3078F DIAST BP <80 MM HG: CPT | Performed by: INTERNAL MEDICINE

## 2025-04-04 RX ORDER — DULOXETIN HYDROCHLORIDE 20 MG/1
20 CAPSULE, DELAYED RELEASE ORAL 2 TIMES DAILY
Qty: 60 CAPSULE | Refills: 1 | Status: SHIPPED | OUTPATIENT
Start: 2025-04-04 | End: 2025-10-01

## 2025-04-04 RX ORDER — PREDNISONE 5 MG/1
TABLET ORAL
Qty: 22 TABLET | Refills: 0 | Status: SHIPPED | OUTPATIENT
Start: 2025-04-04

## 2025-04-04 RX ORDER — CELECOXIB 200 MG/1
CAPSULE ORAL
Qty: 60 CAPSULE | Refills: 1 | Status: SHIPPED | OUTPATIENT
Start: 2025-04-04

## 2025-04-04 NOTE — PROGRESS NOTES
"Subjective   Patient ID: Theodora Goel is a 79 y.o. female who presents for Follow-up.    HPI     Review of Systems    Objective   /65   Pulse 57   Ht 1.676 m (5' 6\")   Wt 68 kg (150 lb)   BMI 24.21 kg/m²     Physical Exam    Assessment/Plan   Diagnoses and all orders for this visit:  Major depressive disorder, recurrent, moderate  -     DULoxetine (Cymbalta) 20 mg DR capsule; Take 1 capsule (20 mg) by mouth 2 times a day. Do not crush or chew.  -     Follow Up In Primary Care - Established; Future  -     Comprehensive Metabolic Panel; Future  -     TSH with reflex to Free T4 if abnormal; Future  -     Vitamin B12; Future  -     Folate; Future  Polyarthralgia  -     DULoxetine (Cymbalta) 20 mg DR capsule; Take 1 capsule (20 mg) by mouth 2 times a day. Do not crush or chew.  -     predniSONE (Deltasone) 5 mg tablet; Please take 2 tablets with breakfast for 2 days, then please take 1 tablet with breakfast until all gone.  Thank you.  -     celecoxib (CeleBREX) 200 mg capsule; Please take 1 capsule by mouth every 12 hours as needed for pain.  Always with food.  No more than 2 capsules please in 24 hours.  Thank you.  -     Follow Up In Primary Care - Established; Future  -     Comprehensive Metabolic Panel; Future  -     Uric Acid; Future  -     Creatine Kinase; Future  -     Vitamin B12; Future  -     Folate; Future  Frailty syndrome in geriatric patient  -     Follow Up In Primary Care - Established  -     Follow Up In Primary Care - Established; Future  -     Comprehensive Metabolic Panel; Future  -     TSH with reflex to Free T4 if abnormal; Future  -     Vitamin B12; Future  -     Folate; Future  Ataxic gait  -     Follow Up In Primary Care - Established; Future  -     Comprehensive Metabolic Panel; Future  -     TSH with reflex to Free T4 if abnormal; Future  -     Vitamin B12; Future  -     Folate; Future  -     Vitamin D 25-Hydroxy,Total (for eval of Vitamin D levels); Future  Bilateral edema " of lower extremity  -     Follow Up In Primary Care - Established; Future  -     Comprehensive Metabolic Panel; Future  -     TSH with reflex to Free T4 if abnormal; Future  Sensorineural hearing loss (SNHL) of both ears  -     Follow Up In Primary Care - Established; Future  -     Comprehensive Metabolic Panel; Future  -     TSH with reflex to Free T4 if abnormal; Future  -     Vitamin B12; Future  -     Folate; Future  Essential hypertension  -     Follow Up In Primary Care - [facility]; Future  -     CBC and Auto Differential; Future  -     Comprehensive Metabolic Panel; Future  -     TSH with reflex to Free T4 if abnormal; Future  -     Magnesium; Future  -     Urinalysis with Reflex Culture and Microscopic; Future  Pure hypercholesterolemia with target low density lipoprotein (LDL) cholesterol less than 70 mg/dL  -     Follow Up In Primary Care - [facility]; Future  -     Comprehensive Metabolic Panel; Future  -     Lipid Panel; Future  Hyperglycemia  -     Follow Up In Primary Care - [facility]; Future  -     Comprehensive Metabolic Panel; Future  -     Urinalysis with Reflex Culture and Microscopic; Future  -     Hemoglobin A1C; Future  Paroxysmal SVT (supraventricular tachycardia) (CMS-HCC)  -     Follow Up In Primary Care - [facility]; Future  -     CBC and Auto Differential; Future  -     Comprehensive Metabolic Panel; Future  -     TSH with reflex to Free T4 if abnormal; Future  -     Magnesium; Future  Paroxysmal atrial fibrillation (Multi)  -     Follow Up In Primary Care - [facility]; Future  -     CBC and Auto Differential; Future  -     Comprehensive Metabolic Panel; Future  -     TSH with reflex to Free T4 if abnormal; Future  -     Magnesium; Future  Chronic obstructive pulmonary disease, unspecified COPD type (Multi)  -     Follow Up In Primary Care - [facility]; Future  -     CBC and Auto Differential; Future  -     Comprehensive Metabolic Panel; Future  -     Magnesium; Future  Anemia due  to vitamin B12 deficiency, unspecified B12 deficiency type  -     Follow Up In Primary Care - [facility]; Future  -     CBC and Auto Differential; Future  -     Urinalysis with Reflex Culture and Microscopic; Future  -     Vitamin B12; Future  -     Folate; Future  -     Ferritin; Future  -     Iron and TIBC; Future  Hypomagnesemia  -     Follow Up In Primary Care - [facility]; Future  -     Comprehensive Metabolic Panel; Future  -     Magnesium; Future  Vitamin D deficiency  -     Follow Up In Primary Care - [facility]; Future  -     Comprehensive Metabolic Panel; Future  -     Vitamin D 25-Hydroxy,Total (for eval of Vitamin D levels); Future  Compression fracture of body of thoracic vertebra (Multi)  -     Follow Up In Primary Care - [facility]; Future  -     Comprehensive Metabolic Panel; Future  -     TSH with reflex to Free T4 if abnormal; Future  -     Vitamin D 25-Hydroxy,Total (for eval of Vitamin D levels); Future  Mild cognitive impairment  -     Follow Up In Primary Care - [facility]; Future  -     Comprehensive Metabolic Panel; Future  -     TSH with reflex to Free T4 if abnormal; Future  -     Vitamin B12; Future  -     Folate; Future  History of delirium  -     Follow Up In Primary Care [facility]; Future  -     Comprehensive Metabolic Panel; Future  -     TSH with reflex to Free T4 if abnormal; Future  -     Vitamin B12; Future  -     Folate; Future  Mixed stress and urge urinary incontinence  -     Follow Up In Primary Care - Established; Future  -     CBC and Auto Differential; Future  -     Urinalysis with Reflex Culture and Microscopic; Future  At risk for polypharmacy  -     Follow Up In Primary Care - [facility]; Future  -     Comprehensive Metabolic Panel; Future  -     TSH with reflex to Free T4 if abnormal; Future       Thank you very much for coming.  I do appreciate your visit, and your trust.    I do understand that you are frustrated by the fact that you are stiff and achy all  the time.  This in turn feeds your frustration and even depression.    Let us do some FASTING blood examinations anytime soon.  You can have it done at the [facility].  The orders will be they are waiting for you.  It will be best for you to make an appointment to get your laboratory examinations done, so that you do not have to wait as long.  The blood examinations may give me a clue as to what best to treat you with.    In the meantime, please continue your current medications, aripiprazole/Abilify, donepezil/Aricept, and mirtazapine/Remeron.  These have actually improved you from the first time I first met you!    TYLENOL EXTRA STRENGTH 500 mg, although this medication is not dramatically effective, it is safe to take for pain.  Take 2 tablets by mouth every 6-8 hours as needed for pain, up to 6 tablets in 24 hours.  I would even recommend that you take 2 tablets when you wake up in the morning, 2 tablets at 2 PM, and again 2 tablets at 9 PM, maximum 6 tablets in 24 hours.    Tylenol Extra Strength will make your other pain medications work better.    CELECOXIB 200 mg capsule, take 1 capsule by mouth every 8 hours as needed for pain.  Take with food.  You can take up to 2 capsules in 24 hours.    Let us also give you a trial of DULOXETINE/Cymbalta 20 mg.  Please take this with breakfast, and again with supper.  Keep your doses 10 to 12 hours apart.  Please be patient, because it takes up to 6 weeks for these medications to work.    Just so that you can get better faster, I will also place you on a very low-dose of PREDNISONE for only a short time.  Please take PREDNISONE 5 mg tablet,  Please take 2 tablets by mouth with breakfast for 2 days,  Then please take 1 tablet by mouth with breakfast until all gone.    There is more to try, but let us first start with these medications.    Please come back in 6 weeks.  I want to make sure that we are gaining ground!    While on CELEBREX/celecoxib, please do not  take any IBUPROFEN.  Discard your ibuprofen so that there is no confusion.    Again, thank you very much for coming.  I am very happy to see you.  Please do not forget to do your FASTING laboratory examinations anytime soon.  Fasting just means that when you wake up in the morning, drink lots of water, but no sugar, no cream, no milk, and nothing to eat until you have your lab work done.    I hope you are having a blessed Lent, and I do wish you a happy Easter!  It is nice to finally meet Dr. Hackett!  I wish him a happy trip back to [place].            0  Return in 6 weeks.  20 minutes please.  Reassess mood, energy, function, review preventive strategies, cardiovascular risk.  Watch out for polypharmacy.  Review fasting laboratory results.  Prioritize issues.  Coordinate with therapist, cardiology/EP.  Review preventive strategies.            0

## 2025-04-04 NOTE — PATIENT INSTRUCTIONS
Thank you very much for coming.  I do appreciate your visit, and your trust.    I do understand that you are frustrated by the fact that you are stiff and achy all the time.  This in turn feeds your frustration and even depression.    Let us do some FASTING blood examinations anytime soon.  You can have it done at the [facility].  The orders will be they are waiting for you.  It will be best for you to make an appointment to get your laboratory examinations done, so that you do not have to wait as long.  The blood examinations may give me a clue as to what best to treat you with.    In the meantime, please continue your current medications, aripiprazole/Abilify, donepezil/Aricept, and mirtazapine/Remeron.  These have actually improved you from the first time I first met you!    TYLENOL EXTRA STRENGTH 500 mg, although this medication is not dramatically effective, it is safe to take for pain.  Take 2 tablets by mouth every 6-8 hours as needed for pain, up to 6 tablets in 24 hours.  I would even recommend that you take 2 tablets when you wake up in the morning, 2 tablets at 2 PM, and again 2 tablets at 9 PM, maximum 6 tablets in 24 hours.    Tylenol Extra Strength will make your other pain medications work better.    CELECOXIB 200 mg capsule, take 1 capsule by mouth every 8 hours as needed for pain.  Take with food.  You can take up to 2 capsules in 24 hours.    Let us also give you a trial of DULOXETINE/Cymbalta 20 mg.  Please take this with breakfast, and again with supper.  Keep your doses 10 to 12 hours apart.  Please be patient, because it takes up to 6 weeks for these medications to work.    Just so that you can get better faster, I will also place you on a very low-dose of PREDNISONE for only a short time.  Please take PREDNISONE 5 mg tablet,  Please take 2 tablets by mouth with breakfast for 2 days,  Then please take 1 tablet by mouth with breakfast until all gone.    There is more to try, but let us  first start with these medications.    Please come back in 6 weeks.  I want to make sure that we are gaining ground!    While on CELEBREX/celecoxib, please do not take any IBUPROFEN.  Discard your ibuprofen so that there is no confusion.    Again, thank you very much for coming.  I am very happy to see you.  Please do not forget to do your FASTING laboratory examinations anytime soon.  Fasting just means that when you wake up in the morning, drink lots of water, but no sugar, no cream, no milk, and nothing to eat until you have your lab work done.    I hope you are having a blessed Lent, and I do wish you a happy Easter!  It is nice to finally meet Dr. Hackett!  I wish him a happy trip back to [place].            0  Return in 6 weeks.  20 minutes please.  Reassess mood, energy, function, review preventive strategies, cardiovascular risk.  Watch out for polypharmacy.  Review fasting laboratory results.  Prioritize issues.  Coordinate with therapist, cardiology/EP.  Review preventive strategies.            0

## 2025-04-28 DIAGNOSIS — J30.89 NON-SEASONAL ALLERGIC RHINITIS, UNSPECIFIED TRIGGER: ICD-10-CM

## 2025-04-28 DIAGNOSIS — F41.9 ANXIETY: ICD-10-CM

## 2025-04-28 RX ORDER — FEXOFENADINE HCL 180 MG/1
180 TABLET ORAL DAILY
Qty: 90 TABLET | Refills: 1 | Status: SHIPPED | OUTPATIENT
Start: 2025-04-28 | End: 2026-04-28

## 2025-04-28 RX ORDER — MIRTAZAPINE 7.5 MG/1
TABLET, FILM COATED ORAL
Qty: 90 TABLET | Refills: 1 | Status: SHIPPED | OUTPATIENT
Start: 2025-04-28

## 2025-04-30 ENCOUNTER — TELEPHONE (OUTPATIENT)
Dept: CARDIOLOGY | Facility: CLINIC | Age: 80
End: 2025-04-30
Payer: MEDICARE

## 2025-04-30 NOTE — TELEPHONE ENCOUNTER
Pt calls in requesting flecainide 50mg refills.   A 6 month supply was called in to [facility] on 1/27/25. Called pharmacy and confirmed pt still has available refills. Pharmacy will fill medication

## 2025-05-06 DIAGNOSIS — I47.10 PAROXYSMAL SVT (SUPRAVENTRICULAR TACHYCARDIA): ICD-10-CM

## 2025-05-10 LAB
ALBUMIN SERPL-MCNC: 4.2 G/DL (ref 3.6–5.1)
ALP SERPL-CCNC: 51 U/L (ref 37–153)
ALT SERPL-CCNC: 10 U/L (ref 6–29)
ANION GAP SERPL CALCULATED.4IONS-SCNC: 11 MMOL/L (CALC) (ref 7–17)
AST SERPL-CCNC: 17 U/L (ref 10–35)
BASOPHILS # BLD AUTO: 28 CELLS/UL (ref 0–200)
BASOPHILS NFR BLD AUTO: 0.4 %
BILIRUB SERPL-MCNC: 0.4 MG/DL (ref 0.2–1.2)
BUN SERPL-MCNC: 33 MG/DL (ref 7–25)
CALCIUM SERPL-MCNC: 9.6 MG/DL (ref 8.6–10.4)
CHLORIDE SERPL-SCNC: 106 MMOL/L (ref 98–110)
CHOLEST SERPL-MCNC: 154 MG/DL
CHOLEST/HDLC SERPL: 2 (CALC)
CK SERPL-CCNC: 33 U/L (ref 18–225)
CO2 SERPL-SCNC: 28 MMOL/L (ref 20–32)
CREAT SERPL-MCNC: 0.8 MG/DL (ref 0.6–1)
EGFRCR SERPLBLD CKD-EPI 2021: 75 ML/MIN/1.73M2
EOSINOPHIL # BLD AUTO: 179 CELLS/UL (ref 15–500)
EOSINOPHIL NFR BLD AUTO: 2.6 %
ERYTHROCYTE [DISTWIDTH] IN BLOOD BY AUTOMATED COUNT: 11.9 % (ref 11–15)
FERRITIN SERPL-MCNC: 114 NG/ML (ref 16–288)
FOLATE SERPL-MCNC: 8.6 NG/ML
GLUCOSE SERPL-MCNC: 101 MG/DL (ref 65–99)
HCT VFR BLD AUTO: 38.7 % (ref 35–45)
HDLC SERPL-MCNC: 78 MG/DL
HGB BLD-MCNC: 12.9 G/DL (ref 11.7–15.5)
IRON SATN MFR SERPL: 38 % (CALC) (ref 16–45)
IRON SERPL-MCNC: 110 MCG/DL (ref 45–160)
LDLC SERPL CALC-MCNC: 55 MG/DL (CALC)
LYMPHOCYTES # BLD AUTO: 1587 CELLS/UL (ref 850–3900)
LYMPHOCYTES NFR BLD AUTO: 23 %
MAGNESIUM SERPL-MCNC: 1.9 MG/DL (ref 1.5–2.5)
MCH RBC QN AUTO: 33.3 PG (ref 27–33)
MCHC RBC AUTO-ENTMCNC: 33.3 G/DL (ref 32–36)
MCV RBC AUTO: 100 FL (ref 80–100)
MONOCYTES # BLD AUTO: 469 CELLS/UL (ref 200–950)
MONOCYTES NFR BLD AUTO: 6.8 %
NEUTROPHILS # BLD AUTO: 4637 CELLS/UL (ref 1500–7800)
NEUTROPHILS NFR BLD AUTO: 67.2 %
NONHDLC SERPL-MCNC: 76 MG/DL (CALC)
PLATELET # BLD AUTO: 173 THOUSAND/UL (ref 140–400)
PMV BLD REES-ECKER: 11.7 FL (ref 7.5–12.5)
POTASSIUM SERPL-SCNC: 4.4 MMOL/L (ref 3.5–5.3)
PROT SERPL-MCNC: 6.7 G/DL (ref 6.1–8.1)
RBC # BLD AUTO: 3.87 MILLION/UL (ref 3.8–5.1)
SODIUM SERPL-SCNC: 145 MMOL/L (ref 135–146)
TIBC SERPL-MCNC: 286 MCG/DL (CALC) (ref 250–450)
TRIGL SERPL-MCNC: 128 MG/DL
TSH SERPL-ACNC: 3.35 MIU/L (ref 0.4–4.5)
URATE SERPL-MCNC: 5.6 MG/DL (ref 2.5–7)
VIT B12 SERPL-MCNC: 1524 PG/ML (ref 200–1100)
WBC # BLD AUTO: 6.9 THOUSAND/UL (ref 3.8–10.8)

## 2025-05-14 LAB
APPEARANCE UR: ABNORMAL
BACTERIA #/AREA URNS HPF: ABNORMAL /HPF
BACTERIA UR CULT: ABNORMAL
BACTERIA UR CULT: ABNORMAL
BILIRUB UR QL STRIP: ABNORMAL
COLOR UR: ABNORMAL
GLUCOSE UR QL STRIP: NEGATIVE
HGB UR QL STRIP: NEGATIVE
HYALINE CASTS #/AREA URNS LPF: ABNORMAL /LPF
KETONES UR QL STRIP: ABNORMAL
LEUKOCYTE ESTERASE UR QL STRIP: ABNORMAL
NITRITE UR QL STRIP: POSITIVE
PH UR STRIP: 6 [PH] (ref 5–8)
PROT UR QL STRIP: ABNORMAL
RBC #/AREA URNS HPF: ABNORMAL /HPF
SERVICE CMNT-IMP: ABNORMAL
SP GR UR STRIP: 1.02 (ref 1–1.03)
SQUAMOUS #/AREA URNS HPF: ABNORMAL /HPF
WBC #/AREA URNS HPF: ABNORMAL /HPF

## 2025-05-16 ENCOUNTER — APPOINTMENT (OUTPATIENT)
Dept: PRIMARY CARE | Facility: CLINIC | Age: 80
End: 2025-05-16
Payer: MEDICARE

## 2025-05-16 VITALS
OXYGEN SATURATION: 93 % | WEIGHT: 139.2 LBS | SYSTOLIC BLOOD PRESSURE: 96 MMHG | DIASTOLIC BLOOD PRESSURE: 63 MMHG | HEART RATE: 88 BPM | BODY MASS INDEX: 22.37 KG/M2 | HEIGHT: 66 IN

## 2025-05-16 DIAGNOSIS — J44.9 CHRONIC OBSTRUCTIVE PULMONARY DISEASE, UNSPECIFIED COPD TYPE (MULTI): ICD-10-CM

## 2025-05-16 DIAGNOSIS — S22.000A COMPRESSION FRACTURE OF BODY OF THORACIC VERTEBRA (MULTI): ICD-10-CM

## 2025-05-16 DIAGNOSIS — E55.9 VITAMIN D DEFICIENCY: ICD-10-CM

## 2025-05-16 DIAGNOSIS — G89.29 CHRONIC MIDLINE LOW BACK PAIN WITH SCIATICA, SCIATICA LATERALITY UNSPECIFIED: ICD-10-CM

## 2025-05-16 DIAGNOSIS — N39.0 E. COLI UTI (URINARY TRACT INFECTION): ICD-10-CM

## 2025-05-16 DIAGNOSIS — Z87.898 HISTORY OF DELIRIUM: ICD-10-CM

## 2025-05-16 DIAGNOSIS — F33.1 MAJOR DEPRESSIVE DISORDER, RECURRENT, MODERATE: ICD-10-CM

## 2025-05-16 DIAGNOSIS — M54.40 CHRONIC MIDLINE LOW BACK PAIN WITH SCIATICA, SCIATICA LATERALITY UNSPECIFIED: ICD-10-CM

## 2025-05-16 DIAGNOSIS — J30.1 SEASONAL ALLERGIC RHINITIS DUE TO POLLEN: ICD-10-CM

## 2025-05-16 DIAGNOSIS — I10 ESSENTIAL HYPERTENSION: ICD-10-CM

## 2025-05-16 DIAGNOSIS — D51.9 ANEMIA DUE TO VITAMIN B12 DEFICIENCY, UNSPECIFIED B12 DEFICIENCY TYPE: ICD-10-CM

## 2025-05-16 DIAGNOSIS — R73.9 HYPERGLYCEMIA: ICD-10-CM

## 2025-05-16 DIAGNOSIS — R26.0 ATAXIC GAIT: ICD-10-CM

## 2025-05-16 DIAGNOSIS — M62.838 MUSCLE SPASM: ICD-10-CM

## 2025-05-16 DIAGNOSIS — R60.0 BILATERAL EDEMA OF LOWER EXTREMITY: ICD-10-CM

## 2025-05-16 DIAGNOSIS — I47.10 PAROXYSMAL SVT (SUPRAVENTRICULAR TACHYCARDIA): ICD-10-CM

## 2025-05-16 DIAGNOSIS — R53.81 DEBILITY: Primary | ICD-10-CM

## 2025-05-16 DIAGNOSIS — B96.20 E. COLI UTI (URINARY TRACT INFECTION): ICD-10-CM

## 2025-05-16 DIAGNOSIS — E83.42 HYPOMAGNESEMIA: ICD-10-CM

## 2025-05-16 DIAGNOSIS — Z11.59 ENCOUNTER FOR HEPATITIS C SCREENING TEST FOR LOW RISK PATIENT: ICD-10-CM

## 2025-05-16 DIAGNOSIS — E78.00 PURE HYPERCHOLESTEROLEMIA WITH TARGET LOW DENSITY LIPOPROTEIN (LDL) CHOLESTEROL LESS THAN 70 MG/DL: ICD-10-CM

## 2025-05-16 DIAGNOSIS — N39.46 MIXED STRESS AND URGE URINARY INCONTINENCE: ICD-10-CM

## 2025-05-16 DIAGNOSIS — Z91.89 AT RISK FOR POLYPHARMACY: ICD-10-CM

## 2025-05-16 DIAGNOSIS — I48.0 PAROXYSMAL ATRIAL FIBRILLATION (MULTI): ICD-10-CM

## 2025-05-16 DIAGNOSIS — G31.84 MILD COGNITIVE IMPAIRMENT: ICD-10-CM

## 2025-05-16 PROCEDURE — G2211 COMPLEX E/M VISIT ADD ON: HCPCS | Performed by: INTERNAL MEDICINE

## 2025-05-16 PROCEDURE — 1159F MED LIST DOCD IN RCRD: CPT | Performed by: INTERNAL MEDICINE

## 2025-05-16 PROCEDURE — 3078F DIAST BP <80 MM HG: CPT | Performed by: INTERNAL MEDICINE

## 2025-05-16 PROCEDURE — 99214 OFFICE O/P EST MOD 30 MIN: CPT | Performed by: INTERNAL MEDICINE

## 2025-05-16 PROCEDURE — 3074F SYST BP LT 130 MM HG: CPT | Performed by: INTERNAL MEDICINE

## 2025-05-16 PROCEDURE — 1160F RVW MEDS BY RX/DR IN RCRD: CPT | Performed by: INTERNAL MEDICINE

## 2025-05-16 PROCEDURE — 1036F TOBACCO NON-USER: CPT | Performed by: INTERNAL MEDICINE

## 2025-05-16 RX ORDER — METOPROLOL SUCCINATE 25 MG/1
TABLET, EXTENDED RELEASE ORAL
Qty: 90 TABLET | Refills: 0 | Status: SHIPPED | OUTPATIENT
Start: 2025-05-16

## 2025-05-16 RX ORDER — AMOXICILLIN AND CLAVULANATE POTASSIUM 875; 125 MG/1; MG/1
TABLET, FILM COATED ORAL
Qty: 10 TABLET | Refills: 0 | Status: SHIPPED | OUTPATIENT
Start: 2025-05-16

## 2025-05-16 RX ORDER — LEVOCETIRIZINE DIHYDROCHLORIDE 5 MG/1
5 TABLET, FILM COATED ORAL EVERY EVENING
Qty: 30 TABLET | Refills: 2 | Status: SHIPPED | OUTPATIENT
Start: 2025-05-16 | End: 2026-05-16

## 2025-05-16 RX ORDER — LOSARTAN POTASSIUM 25 MG/1
TABLET ORAL
Qty: 100 TABLET | Refills: 0 | Status: SHIPPED | OUTPATIENT
Start: 2025-05-16

## 2025-05-16 NOTE — PROGRESS NOTES
Subjective   Patient ID: Theodora Goel is a 79 y.o. female who presents for Follow-up (Patient presented today for a 6 week follow up./).    HPI   The patient is here for reevaluation of preventive strategies, cardiovascular risk, and review of interval history.  She states that she has been miserable because of back pain, shortness of breath, palpitations, and has noted that her blood pressure has been low.  She has also been lightheaded.  It is worthwhile to note that she was evaluated for this apparently by the office of her cardiologist, and a recommendation to decrease her METOPROLOL TARTRATE 25 mg taken twice a day, was given to the patient.  She was compliant with this recommendation.    Fortunately, no dano substernal chest pain, no orthopnea, no paroxysmal nocturnal dyspnea.  Likewise, no loss of consciousness.  Also, no headache, blurred vision, no diplopia, no dysphagia.  No increasing ataxia, no recent falls.  No confusion or delirium, with patient not worried about memory.  Continues to want to improve quality of life, and does not wish harm to self or others.    Also, the patient states that she has had some spasms affecting her hands.  Again, fortunately, no falls.  Has been managing with the use of a quad cane, as well as a rollator walker.    She has also been experiencing increasing frequency of RUNNY NOSE, clear discharge.  She was using ALLEGRA, as well as FLUTICASONE, but states the Allegra did not seem to work anymore, and she was not keeping up with her fluticasone.  She was taking Sudafed at 1 point, but she understands not to take this medication anymore.  No particular sputum production noted.    Remarkably, laboratory examinations did reveal a urinary tract infection, with the patient stating that she has no urine symptoms whatsoever.  No dysuria, flank, no suprapubic pain.  She has had some back pain, unclear if related, as well as history of ataxia, unclear if related.   "Otherwise, appetite preserved, no abdominal distress.  No skin changes.  CONSTITUTIONALLY, no fever, no chills.  No night sweats.  No lingering anorexia or nausea.  No apparent lymphadenopathy.  No apparent weight loss.        Review of Systems  Review of systems as in history of present illness, and otherwise, reviewed separately as well, and was unremarkable/negative/noncontributory.        Objective   BP 96/63 (BP Location: Left arm, Patient Position: Sitting, BP Cuff Size: Adult)   Pulse 88   Ht 1.676 m (5' 6\")   Wt 63.1 kg (139 lb 3.2 oz)   SpO2 93%   BMI 22.47 kg/m²     Physical Exam  In good spirits.  Eager to get better, not wishing harm to self or others.  Currently, not in distress, speaking in complete sentences, and not diaphoretic.  Receptive, perhaps minimally hard of hearing, and sometimes perhaps a little slow to answer, but keeping up with conversation.  Amiable, not unkempt.  Moderately built.  Appropriate.    HEAD pink palpebral conjunctivae, anicteric sclerae.  Mucous membranes moist.  No tonsillopharyngeal congestion, no thrush.  NECK supple, no apparent jugular venous distention.  No carotid bruit.  CARDIOVASCULAR not in distress or diaphoresis.  No bipedal edema.  Mild tachycardia noted.  Seemingly regular rate and rhythm.  No murmurs appreciated.  LUNGS not in distress or diaphoresis.  Not using accessory muscles.  Clear to auscultation bilaterally.  ABDOMEN soft, nontender.  BACK no costovertebral angle tenderness.  Paravertebral muscle spasm noted, right.  Reproducible back pain.  EXTREMITIES no clubbing, no cyanosis.  NEURO no facial asymmetry.  No apparent cranial nerve deficits.  Patient comfortable in wheelchair.  Brought quad cane for ambulation.  No apparent focal weakness.  No tremors.  PSYCH receptive, appropriate, and eager to maintain and improve quality of life.        LABORATORY results noted, with URINALYSIS positive for UTI, culture growing pansensitive E. coli.  Kidney " function preserved.  Liver function preserved.  No anemia, with anemia indexes negative.  LDL cholesterol noted at 55.  Patient tolerating rosuvastatin 20 mg.  Again, liver function preserved.  Hemoglobin A1c last tested August 2024 5.3.  Vitamin D last tested May 20 2426.  Patient is on vitamin D supplementation.    Again, of note, no anemia, with kidney and liver function preserved.  Magnesium, potassium, electrolytes all within normal limits.  Thyroid function adequate.        Assessment/Plan   Diagnoses and all orders for this visit:  Debility  -     Follow Up In Primary Care - Established; Future  Paroxysmal SVT (supraventricular tachycardia) (CMS-HCC)  -     metoprolol succinate XL (Toprol-XL) 25 mg 24 hr tablet; Please take 1 tablet by mouth with SUPPER every evening.  Do not crush or chew.  Thank you.  -     Follow Up In Primary Care - Established; Future  Paroxysmal atrial fibrillation (Multi)  -     metoprolol succinate XL (Toprol-XL) 25 mg 24 hr tablet; Please take 1 tablet by mouth with SUPPER every evening.  Do not crush or chew.  Thank you.  -     Follow Up In Primary Care - Established; Future  Essential hypertension  -     Follow Up In Primary Care - Established  -     metoprolol succinate XL (Toprol-XL) 25 mg 24 hr tablet; Please take 1 tablet by mouth with SUPPER every evening.  Do not crush or chew.  Thank you.  -     losartan (Cozaar) 25 mg tablet; Please take 1 tablet by mouth with BREAKFAST every morning.  Thank you.  -     Follow Up In Primary Care - Established; Future  E. coli UTI (urinary tract infection)  -     amoxicillin-clavulanate (Augmentin) 875-125 mg tablet; Please take 1 tablet by mouth with breakfast, and again 1 tablet by mouth with supper.  Keep doses 10 to 12 hours apart.  Drink lots of fluids throughout the day.  Urinate often while awake.  Always with food please.  -     Follow Up In Primary Care - Established; Future  Mixed stress and urge urinary incontinence  -     Follow  Up In Primary Care - Established; Future  Muscle spasm  -     Follow Up In Primary Care - [facility]; Future  Chronic midline low back pain with sciatica, sciatica laterality unspecified  -     Follow Up In Primary Care - Established; Future  Compression fracture of body of thoracic vertebra (Multi)  -     Follow Up In Primary Care - Established; Future  Vitamin D deficiency  -     Follow Up In Primary Care - Established; Future  Seasonal allergic rhinitis due to pollen  -     levocetirizine (Xyzal) 5 mg tablet; Take 1 tablet (5 mg) by mouth once daily in the evening.  -     Follow Up In Primary Care - Established; Future  Chronic obstructive pulmonary disease, unspecified COPD type (Multi)  -     Follow Up In Primary Care - [facility]; Future  Major depressive disorder, recurrent, moderate  -     Follow Up In Primary Care - Established; Future  Mild cognitive impairment  -     Follow Up In Primary Care - Established; Future  History of delirium  -     Follow Up In Primary Care - [facility]; Future  Ataxic gait  -     Follow Up In Primary Care - [facility]; Future  Bilateral edema of lower extremity  -     Follow Up In Primary Care - [facility]; Future  Pure hypercholesterolemia with target low density lipoprotein (LDL) cholesterol less than 70 mg/dL  -     Follow Up In Primary Care - Established; Future  Hyperglycemia  -     Follow Up In Primary Care - Established; Future  Anemia due to vitamin B12 deficiency, unspecified B12 deficiency type  -     Follow Up In Primary Care - Established; Future  Hypomagnesemia  -     Follow Up In Primary Care - Established; Future  At risk for polypharmacy  -     Follow Up In Primary Care - Established; Future  Encounter for hepatitis C screening test for low risk patient  -     Follow Up In Primary Care - Established; Future       Thank you for much for coming.  It is always nice to see you!    I am sorry to hear that you have been miserable.  In the meantime, I did notice  that your heart rate is a little high, and your blood pressure is perhaps a little too low.  This can explain why you feel tired and even getting short of breath with activity.  This can also explain your sensation of a high heart rate, as well as your episodes of lightheadedness.    Please throw away your METOPROLOL TARTRATE.  We will need to change it to a long-acting regimen.  Please throw away your LOSARTAN 100 mg.  We will have to change it to a more flexible dose.    Starting tonight, please take METOPROLOL SUCCINATE 25 mg.  You only need 1 tablet by mouth with supper every evening.    Starting tomorrow, please take LOSARTAN only 25 mg by mouth with BREAKFAST.    Please make sure to keep up with fluids.  Drink lots of fluids throughout the day.    After 1 week, start checking your blood pressure in a calm and resting state.  Seated position, feet flat on the floor.  Use the backrest of the chair.  Please call if your heart rate at rest is 90 or higher.  Please call if the first number of your blood pressure is persistently 145 or higher, or if it is 90 or lower.  Please call if the second number of your blood pressure is persistently 88 or higher.    I can make adjustments over the telephone until I see you again.  I would like to see you again in 3 weeks to make sure that you are back on your feet!    You also do have a URINE INFECTION which can cause you to be tired all the time.  I do understand that you have no symptoms, but your urine did show significant infection.  Please drink lots of fluids throughout the day.  Please urinate often while awake.  Please take generic Augmentin antibiotic for your urine infection.  This antibiotic will not interfere with your FLECAINIDE.  Take AMOXICILLIN/CLAVULANATE 875 mg tablet with breakfast, and again with supper.  Always with food please, very important.  You will only need this antibiotic for 5 days.  Again, drink lots of fluids throughout the day, and urinate  often while awake.    You also have that MUSCLE SPASM on your back, as well as muscle spasms affecting your hands.  Let us give you a trial of TIZANIDINE muscle relaxant 2 mg.  Take this every 6 hours as needed for musculoskeletal pain or spasm.  Watch out for sleepiness.    Also, get yourself a heating pad, and use this on your back.  This will help relax your back muscles.  If you continue to have back spasms, it would be a good idea to consider PHYSICAL THERAPY.    Please make sure to use your FLUTICASONE nasal steroid, 1 spray to each nostril after breakfast time, and again 1 spray to each nostril after suppertime.  This will help control your allergy symptoms, especially your runny nose.  Also, let us see if your insurance will pay for XYZAL allergy medication.  5 mg tablet, take this with supper every evening.    Again, thank you very much for coming.  I am sorry that I had to add more medications to your regimens.  Hopefully, you will start feeling better, and we will try to cut back on some of your medications.    Come back in 3 weeks, and we will reevaluate everything.  We will prepare you so that when you see Dr. Martins again, you will be up and about!  Until then, please continue to take care of yourself and each other, and please continue to pray for our recovery from this pandemic.  I hope you had a good Easter Sunday, and I do wish you a happy summer!  See you in 3 weeks.            0  Return in 3 weeks.  20 minutes please.  Reassess debility, hemodynamics, cardiovascular risk.  Prepare for appointment with cardiology.  Reassess mood, energy, function, preventive strategies, cardiovascular risk.  Watch out for polypharmacy.  Review preventive strategies, bone density testing, vaccination profile, perhaps nonfasting laboratory examinations, hepatitis C screening, vitamin D, hemoglobin A1c.            0

## 2025-05-16 NOTE — PATIENT INSTRUCTIONS
Thank you for much for coming.  It is always nice to see you!    I am sorry to hear that you have been miserable.  In the meantime, I did notice that your heart rate is a little high, and your blood pressure is perhaps a little too low.  This can explain why you feel tired and even getting short of breath with activity.  This can also explain your sensation of a high heart rate, as well as your episodes of lightheadedness.    Please throw away your METOPROLOL TARTRATE.  We will need to change it to a long-acting regimen.  Please throw away your LOSARTAN 100 mg.  We will have to change it to a more flexible dose.    Starting tonight, please take METOPROLOL SUCCINATE 25 mg.  You only need 1 tablet by mouth with supper every evening.    Starting tomorrow, please take LOSARTAN only 25 mg by mouth with BREAKFAST.    Please make sure to keep up with fluids.  Drink lots of fluids throughout the day.    After 1 week, start checking your blood pressure in a calm and resting state.  Seated position, feet flat on the floor.  Use the backrest of the chair.  Please call if your heart rate at rest is 90 or higher.  Please call if the first number of your blood pressure is persistently 145 or higher, or if it is 90 or lower.  Please call if the second number of your blood pressure is persistently 88 or higher.    I can make adjustments over the telephone until I see you again.  I would like to see you again in 3 weeks to make sure that you are back on your feet!    You also do have a URINE INFECTION which can cause you to be tired all the time.  I do understand that you have no symptoms, but your urine did show significant infection.  Please drink lots of fluids throughout the day.  Please urinate often while awake.  Please take generic Augmentin antibiotic for your urine infection.  This antibiotic will not interfere with your FLECAINIDE.  Take AMOXICILLIN/CLAVULANATE 875 mg tablet with breakfast, and again with supper.  Always  with food please, very important.  You will only need this antibiotic for 5 days.  Again, drink lots of fluids throughout the day, and urinate often while awake.    You also have that MUSCLE SPASM on your back, as well as muscle spasms affecting your hands.  Let us give you a trial of TIZANIDINE muscle relaxant 2 mg.  Take this every 6 hours as needed for musculoskeletal pain or spasm.  Watch out for sleepiness.    Also, get yourself a heating pad, and use this on your back.  This will help relax your back muscles.  If you continue to have back spasms, it would be a good idea to consider PHYSICAL THERAPY.    Please make sure to use your FLUTICASONE nasal steroid, 1 spray to each nostril after breakfast time, and again 1 spray to each nostril after suppertime.  This will help control your allergy symptoms, especially your runny nose.  Also, let us see if your insurance will pay for XYZAL allergy medication.  5 mg tablet, take this with supper every evening.    Again, thank you very much for coming.  I am sorry that I had to add more medications to your regimens.  Hopefully, you will start feeling better, and we will try to cut back on some of your medications.    Come back in 3 weeks, and we will reevaluate everything.  We will prepare you so that when you see Dr. Martins again, you will be up and about!  Until then, please continue to take care of yourself and each other, and please continue to pray for our recovery from this pandemic.  I hope you had a good Easter Sunday, and I do wish you a happy summer!  See you in 3 weeks.            0  Return in 3 weeks.  20 minutes please.  Reassess debility, hemodynamics, cardiovascular risk.  Prepare for appointment with cardiology.  Reassess mood, energy, function, preventive strategies, cardiovascular risk.  Watch out for polypharmacy.  Review preventive strategies, bone density testing, vaccination profile, perhaps nonfasting laboratory examinations, hepatitis C screening,  vitamin D, hemoglobin A1c.            0

## 2025-05-17 RX ORDER — METOPROLOL TARTRATE 25 MG/1
TABLET, FILM COATED ORAL
Qty: 90 TABLET | Refills: 1 | OUTPATIENT
Start: 2025-05-17

## 2025-05-19 ENCOUNTER — TELEPHONE (OUTPATIENT)
Dept: PRIMARY CARE | Facility: CLINIC | Age: 80
End: 2025-05-19
Payer: MEDICARE

## 2025-05-19 DIAGNOSIS — S22.000A COMPRESSION FRACTURE OF BODY OF THORACIC VERTEBRA (MULTI): ICD-10-CM

## 2025-05-19 DIAGNOSIS — G89.29 CHRONIC MIDLINE LOW BACK PAIN WITH SCIATICA, SCIATICA LATERALITY UNSPECIFIED: ICD-10-CM

## 2025-05-19 DIAGNOSIS — M54.40 CHRONIC MIDLINE LOW BACK PAIN WITH SCIATICA, SCIATICA LATERALITY UNSPECIFIED: ICD-10-CM

## 2025-05-19 DIAGNOSIS — M62.838 MUSCLE SPASM: Primary | ICD-10-CM

## 2025-05-20 RX ORDER — TIZANIDINE 2 MG/1
TABLET ORAL
Qty: 30 TABLET | Refills: 0 | Status: SHIPPED | OUTPATIENT
Start: 2025-05-20

## 2025-06-03 DIAGNOSIS — G89.29 CHRONIC MIDLINE LOW BACK PAIN WITH SCIATICA, SCIATICA LATERALITY UNSPECIFIED: ICD-10-CM

## 2025-06-03 DIAGNOSIS — E55.9 VITAMIN D DEFICIENCY: ICD-10-CM

## 2025-06-03 DIAGNOSIS — M62.838 MUSCLE SPASM: ICD-10-CM

## 2025-06-03 DIAGNOSIS — S22.000A COMPRESSION FRACTURE OF BODY OF THORACIC VERTEBRA (MULTI): ICD-10-CM

## 2025-06-03 DIAGNOSIS — M54.40 CHRONIC MIDLINE LOW BACK PAIN WITH SCIATICA, SCIATICA LATERALITY UNSPECIFIED: ICD-10-CM

## 2025-06-04 RX ORDER — TIZANIDINE 2 MG/1
TABLET ORAL
Qty: 30 TABLET | Refills: 1 | Status: SHIPPED | OUTPATIENT
Start: 2025-06-04

## 2025-06-04 RX ORDER — CHOLECALCIFEROL (VITAMIN D3) 1250 MCG
TABLET ORAL
Qty: 13 TABLET | Refills: 0 | Status: SHIPPED | OUTPATIENT
Start: 2025-06-04

## 2025-06-06 ENCOUNTER — APPOINTMENT (OUTPATIENT)
Dept: PRIMARY CARE | Facility: CLINIC | Age: 80
End: 2025-06-06
Payer: MEDICARE

## 2025-06-06 VITALS
WEIGHT: 139.3 LBS | OXYGEN SATURATION: 95 % | HEART RATE: 54 BPM | HEIGHT: 66 IN | SYSTOLIC BLOOD PRESSURE: 115 MMHG | BODY MASS INDEX: 22.39 KG/M2 | DIASTOLIC BLOOD PRESSURE: 71 MMHG

## 2025-06-06 DIAGNOSIS — E55.9 VITAMIN D DEFICIENCY: ICD-10-CM

## 2025-06-06 DIAGNOSIS — F33.1 MAJOR DEPRESSIVE DISORDER, RECURRENT, MODERATE: ICD-10-CM

## 2025-06-06 DIAGNOSIS — M62.838 MUSCLE SPASM: ICD-10-CM

## 2025-06-06 DIAGNOSIS — E83.42 HYPOMAGNESEMIA: ICD-10-CM

## 2025-06-06 DIAGNOSIS — G31.84 MILD COGNITIVE IMPAIRMENT: ICD-10-CM

## 2025-06-06 DIAGNOSIS — E78.00 PURE HYPERCHOLESTEROLEMIA WITH TARGET LOW DENSITY LIPOPROTEIN (LDL) CHOLESTEROL LESS THAN 70 MG/DL: ICD-10-CM

## 2025-06-06 DIAGNOSIS — J30.1 SEASONAL ALLERGIC RHINITIS DUE TO POLLEN: ICD-10-CM

## 2025-06-06 DIAGNOSIS — R73.9 HYPERGLYCEMIA: ICD-10-CM

## 2025-06-06 DIAGNOSIS — R53.81 DEBILITY: ICD-10-CM

## 2025-06-06 DIAGNOSIS — D51.9 ANEMIA DUE TO VITAMIN B12 DEFICIENCY, UNSPECIFIED B12 DEFICIENCY TYPE: ICD-10-CM

## 2025-06-06 DIAGNOSIS — R60.0 BILATERAL EDEMA OF LOWER EXTREMITY: ICD-10-CM

## 2025-06-06 DIAGNOSIS — B96.20 E. COLI UTI (URINARY TRACT INFECTION): ICD-10-CM

## 2025-06-06 DIAGNOSIS — I47.10 PAROXYSMAL SVT (SUPRAVENTRICULAR TACHYCARDIA): ICD-10-CM

## 2025-06-06 DIAGNOSIS — I48.0 PAROXYSMAL ATRIAL FIBRILLATION (MULTI): ICD-10-CM

## 2025-06-06 DIAGNOSIS — N39.46 MIXED STRESS AND URGE URINARY INCONTINENCE: ICD-10-CM

## 2025-06-06 DIAGNOSIS — Z91.89 AT RISK FOR POLYPHARMACY: ICD-10-CM

## 2025-06-06 DIAGNOSIS — I10 ESSENTIAL HYPERTENSION: ICD-10-CM

## 2025-06-06 DIAGNOSIS — S22.000A COMPRESSION FRACTURE OF BODY OF THORACIC VERTEBRA (MULTI): ICD-10-CM

## 2025-06-06 DIAGNOSIS — Z11.59 ENCOUNTER FOR HEPATITIS C SCREENING TEST FOR LOW RISK PATIENT: ICD-10-CM

## 2025-06-06 DIAGNOSIS — G89.29 CHRONIC MIDLINE LOW BACK PAIN WITH SCIATICA, SCIATICA LATERALITY UNSPECIFIED: ICD-10-CM

## 2025-06-06 DIAGNOSIS — R26.0 ATAXIC GAIT: ICD-10-CM

## 2025-06-06 DIAGNOSIS — M25.50 POLYARTHRALGIA: ICD-10-CM

## 2025-06-06 DIAGNOSIS — Z87.898 HISTORY OF DELIRIUM: ICD-10-CM

## 2025-06-06 DIAGNOSIS — N39.0 E. COLI UTI (URINARY TRACT INFECTION): ICD-10-CM

## 2025-06-06 DIAGNOSIS — J44.9 CHRONIC OBSTRUCTIVE PULMONARY DISEASE, UNSPECIFIED COPD TYPE (MULTI): ICD-10-CM

## 2025-06-06 DIAGNOSIS — M54.40 CHRONIC MIDLINE LOW BACK PAIN WITH SCIATICA, SCIATICA LATERALITY UNSPECIFIED: ICD-10-CM

## 2025-06-06 PROCEDURE — 1160F RVW MEDS BY RX/DR IN RCRD: CPT | Performed by: INTERNAL MEDICINE

## 2025-06-06 PROCEDURE — 1159F MED LIST DOCD IN RCRD: CPT | Performed by: INTERNAL MEDICINE

## 2025-06-06 PROCEDURE — G2211 COMPLEX E/M VISIT ADD ON: HCPCS | Performed by: INTERNAL MEDICINE

## 2025-06-06 PROCEDURE — 3078F DIAST BP <80 MM HG: CPT | Performed by: INTERNAL MEDICINE

## 2025-06-06 PROCEDURE — 3074F SYST BP LT 130 MM HG: CPT | Performed by: INTERNAL MEDICINE

## 2025-06-06 PROCEDURE — 99214 OFFICE O/P EST MOD 30 MIN: CPT | Performed by: INTERNAL MEDICINE

## 2025-06-06 RX ORDER — DULOXETIN HYDROCHLORIDE 20 MG/1
20 CAPSULE, DELAYED RELEASE ORAL 2 TIMES DAILY
Qty: 180 CAPSULE | Refills: 0 | Status: SHIPPED | OUTPATIENT
Start: 2025-06-06 | End: 2025-09-04

## 2025-06-06 RX ORDER — IPRATROPIUM BROMIDE 42 UG/1
SPRAY, METERED NASAL
Qty: 15 ML | Refills: 1 | Status: SHIPPED | OUTPATIENT
Start: 2025-06-06

## 2025-06-06 NOTE — PROGRESS NOTES
"Subjective   Patient ID: Theodora Goel is a 79 y.o. female who presents for Follow-up (Patient presented today for a 3 week follow up./).    HPI   Compliant with medications, tolerating regimens.  The patient is seemingly responding with decreased episodes of palpitations and dyspnea on exertion.  No dano substernal chest pain, no orthopnea, no paroxysmal nocturnal dyspnea.  Following with CARDIOLOGY, Dr. Martins.    In the meantime, noted to have low back pain, ataxia, but no falls.  No headache, blurred vision, no diplopia, no dysphagia.  Continues to have good insight regarding memory and function.  No headache, no blurred vision, no diplopia.  No dysphagia.  Does want to maintain and hopefully improve quality of life.  Does not wish harm to self or others.    No particular cough, no particular sputum production.  No abdominal distress.  No dysuria, flank, suprapubic pain.  No particular skin changes.  ENDOCRINE with no polyuria, polydipsia, polyphagia.  No blurred vision.  No skin, hair, nail changes.  CONSTITUTIONALLY, no fever, no chills.  No night sweats.  No lingering anorexia or nausea.  No apparent lymphadenopathy.     Son in attendance, coping well with the needs of his mother, with no apparent caregiver stress.        Review of Systems  Review of systems as in history of present illness, and otherwise, reviewed separately as well, and was unremarkable/negative/noncontributory.         Objective   /71 (BP Location: Left arm, Patient Position: Sitting, BP Cuff Size: Adult)   Pulse 54   Ht 1.676 m (5' 6\")   Wt 63.2 kg (139 lb 4.8 oz)   SpO2 95%   BMI 22.48 kg/m²     Physical Exam  In good spirits.  Not in distress or diaphoresis.  Receptive, oriented easily to person and place, perhaps with some delay in terms of time.  Amiable.  Not unkempt.  Moderately built.  Does want to maintain and hopefully improve quality of life.  Does not wish harm to self or others.  Appropriate.    HEAD pink " palpebral conjunctivae, anicteric sclerae.  NECK supple, no apparent jugular venous distention.  CARDIOVASCULAR not in distress or diaphoresis.  No bipedal edema.  LUNGS not in distress or diaphoresis.  Not using accessory muscles.  ABDOMEN soft, nontender.  BACK no costovertebral angle tenderness.  EXTREMITIES no clubbing, no cyanosis.  NEURO no facial asymmetry.  No apparent cranial nerve deficits.  Romberg negative.  Ambulating without need of assistance.  No apparent focal weakness.  No tremors.  PSYCH receptive, appropriate, and eager to maintain and improve quality of life.        LABORATORY results noted, with May urine findings consistent with urinary tract infection.  No leukocytosis noted.  No anemia.  Current indexes for anemia negative as well.  LDL cholesterol 55.  Preserved liver function.  In May 2024, vitamin D noted at 26.          Assessment/Plan   Diagnoses and all orders for this visit:  Debility  -     Follow Up In Primary Care - Established  -     Follow Up In Primary Care - Established; Future  Paroxysmal SVT (supraventricular tachycardia)  -     Follow Up In Primary Care - Established; Future  Paroxysmal atrial fibrillation (Multi)  -     Follow Up In Primary Care - Established; Future  Essential hypertension  -     Follow Up In Primary Care - Established; Future  E. coli UTI (urinary tract infection)  -     Follow Up In Primary Care - Established; Future  Mixed stress and urge urinary incontinence  -     Follow Up In Primary Care - Established; Future  Muscle spasm  -     Follow Up In Primary Care - Established; Future  Chronic midline low back pain with sciatica, sciatica laterality unspecified  -     Follow Up In Primary Care - Established; Future  Compression fracture of body of thoracic vertebra (Multi)  -     Follow Up In Primary Care - Established; Future  Vitamin D deficiency  -     Vitamin D 25-Hydroxy,Total (for eval of Vitamin D levels); Future  -     Follow Up In Primary Care -  Established; Future  Seasonal allergic rhinitis due to pollen  -     ipratropium (Atrovent) 42 mcg (0.06 %) nasal spray; Please use 2 sprays to each nostril 3 times a day. (Patient not taking: Reported on 6/25/2025)  -     Follow Up In Primary Care - Established; Future  Chronic obstructive pulmonary disease, unspecified COPD type (Multi)  -     Follow Up In Primary Care - hospitals; Future  Major depressive disorder, recurrent, moderate  -     DULoxetine (Cymbalta) 20 mg DR capsule; Take 1 capsule (20 mg) by mouth 2 times a day. Do not crush or chew.  -     Follow Up In Primary Care - Established; Future  Mild cognitive impairment  -     Follow Up In Primary Care - hospitals; Future  History of delirium  -     Follow Up In Primary Care - hospitals; Future  Ataxic gait  -     Follow Up In Primary Care - hospitals; Future  Bilateral edema of lower extremity  -     Follow Up In Primary Care - hospitals; Future  Pure hypercholesterolemia with target low density lipoprotein (LDL) cholesterol less than 70 mg/dL  -     Follow Up In Primary Care - hospitals; Future  Hyperglycemia  -     Follow Up In Primary Care - hospitals; Future  Anemia due to vitamin B12 deficiency, unspecified B12 deficiency type  -     Follow Up In Primary Care - hospitals; Future  Hypomagnesemia  -     Follow Up In Primary Care [facility]; Future  At risk for polypharmacy  -     Follow Up In Primary Care - Established; Future  Encounter for hepatitis C screening test for low risk patient  -     Hepatitis C Antibody; Future  -     Follow Up In Primary Care - Established; Future  Polyarthralgia  -     DULoxetine (Cymbalta) 20 mg DR capsule; Take 1 capsule (20 mg) by mouth 2 times a day. Do not crush or chew.  -     Follow Up In Primary Care - Established; Future       Thank you very much for coming.  I am very happy to see you again.    I do understand that you still have some episodes of fatigue, palpitations, lightheadedness, and  even some shortness of breath with activity.  I am glad to hear that it is less than before.    I also am glad to see that most of your blood pressure readings are very good.  The occasional 160 for your first number does happen, but for the most part, your blood pressure is averaging 130s for the first number, and your heart rate is between 50s and 70s.    Please continue taking your METOPROLOL SUCCINATE, as well as your LOSARTAN.  Please continue drinking lots of fluids throughout the day.  Please continue staying physically active with some walking.  All of these contribute to better blood pressure readings!    Let us see what Dr. Martins, CARDIOLOGY, recommends.      I am also glad to hear that you have not had any urine symptoms.  Again, please continue to drink lots of fluids throughout the day.  Please urinate often while awake.  Please urinate before you need to urinate, so that you can minimize any accidents.  Again, thank you for your efforts.      I do understand that you still have RIGHT SIDED BACK PAIN.  Indeed, you have a sore spot on your back which coincides with a column of a MUSCLE SPASM.  MOIST HEAT will help relax your back muscles.  Ice will not help at this time.  Please continue using your muscle relaxant TIZANIDINE.  Please continue using TYLENOL EXTRA STRENGTH 500 mg, 2 tablets by mouth every 6 hours as needed for pain.  You can take up to 6 tablets of acetaminophen and 24 hours.  It will not hurt your kidneys, and it will not interfere with your other medications.    Get yourself some DICLOFENAC GEL and apply it to the area of your back that hurts.  You can also try BIOFREEZE, but do not use them both at the same time.  You can experiment and see which one works better!    If you are no better by Wednesday next week, please let me know, and I will order PHYSICAL THERAPY.      I do understand that your nose still runs.  Please continue using your XYZAL.  Stop using the FLUTICASONE nose spray  at this time.  Instead, we will try IPRATROPIUM nasal pump, 1 spray to each nostril 3 times a day.  Watch out for dryness.      Please let me see you in 4 months.  Until then, if Dr. Martins orders lab work, please do some lab work for me as well.    Please continue to take care of yourself and each other, and please continue to pray for our recovery from this pandemic.  Take care and God bless.  I hope you both have a happy summer!            0  Return in 4 months.  40 minutes please.  Consider repeat FASTING laboratory examinations.  Review interval history with cardiology, possible physical therapy.  Reassess debility, rule out self-neglect.  Review preventive strategies, vaccination profile, possible bone density.            0

## 2025-06-17 NOTE — ED PROVIDER NOTES
3rd Attempt to contact the pt without success. Will close the case with OPCM at this time.     EMERGENCY DEPARTMENT ENCOUNTER      Pt Name: Theodora Goel  MRN: [id number]  Birthdate 1945  Date of evaluation: 5/2/2024  Provider: Srinivasan Chang MD    CHIEF COMPLAINT     Altered mental status and urinary incontinence    HISTORY OF PRESENT ILLNESS    HPI A 78-year-old female with a significant medical history of hypertension, hyperlipidemia, atrial fibrillation (currently not on blood thinner), and a history of frequent falls was brought to the emergency department by her son and daughter due to concerns of altered mental status accompanied by urinary incontinence. The patient is alert and oriented to person, place, and time, but she is hard of hearing, need help from her son and daughter to provide history.    The patient reported feeling very weak and experiencing urinary incontinence but denied any other urinary symptoms, chest pain, shortness of breath, palpitations, or changes in bowel habits. She expressed feeling sad and overwhelmed by a friend's terminal illness, along with recent poor sleep and significant weight loss. She endorsed symptoms of sadness, fatigue, loss of concentration, and appetite loss, but denied any suicidal ideation, thoughts, or plans.    According to her family, she lives alone and appears altered from her baseline, experiencing worsening generalized weakness and short-term memory loss. They noted that she was recently admitted to [facility] from April 14th to 17th for falls and confusion, with negative workup, and was discharged home with plans for outpatient physical therapy, occupational therapy, and a neurological appointment that she did not attend.    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     PAST MEDICAL HISTORY     Past Medical History:   Diagnosis Date    Asymptomatic menopausal state     Menopause    Encounter for other screening for malignant neoplasm of breast 06/24/2022    Breast screening    Encounter for  screening for malignant neoplasm of colon 06/24/2022    Colon cancer screening    Essential (primary) hypertension     Hypertension, benign    History of falling 06/24/2022    H/O fall    Old myocardial infarction     History of myocardial infarction    Other conditions influencing health status     No significant past surgical history    Other dysphagia 01/28/2022    Other dysphagia    Palpitations 06/02/2022    Palpitations    Personal history of diseases of the skin and subcutaneous tissue     History of alopecia    Personal history of diseases of the skin and subcutaneous tissue 06/18/2021    History of eczema    Personal history of other diseases of the circulatory system 11/03/2015    History of supraventricular tachycardia    Personal history of other diseases of the musculoskeletal system and connective tissue     History of osteoarthritis    Personal history of other diseases of urinary system     History of renal failure    Personal history of other endocrine, nutritional and metabolic disease     History of hypothyroidism    Personal history of other mental and behavioral disorders 06/18/2021    History of anxiety    Personal history of other specified conditions     History of prolonged Q-T interval on ECG    Personal history of other specified conditions     History of shortness of breath    Personal history of other specified conditions     History of fatigue    Polyp of corpus uteri     Uterine polyp    Tachycardia, unspecified     Wide-complex tachycardia    Unspecified asthma with (acute) exacerbation ([facility])     Asthma exacerbation         SURGICAL HISTORY       Past Surgical History:   Procedure Laterality Date    NOSE SURGERY  11/02/2018    Nose Surgery    OTHER SURGICAL HISTORY  11/19/2021    Loop recorder insertion    OTHER SURGICAL HISTORY  01/28/2022    Cardioverter defibrillator insertion         CURRENT MEDICATIONS       Current Discharge Medication List        CONTINUE these medications  which have NOT CHANGED    Details   albuterol 90 mcg/actuation inhaler Inhale 2 puffs every 4 hours if needed for shortness of breath or wheezing.  Qty: 18 g, Refills: 3    Associated Diagnoses: Asthma, unspecified asthma severity, unspecified whether complicated, unspecified whether persistent ([facility])      alendronate (Fosamax) 70 mg tablet Take 1 tablet (70 mg) by mouth every 7 days. Take in the morning with a full glass of water, on an empty stomach, and do not take anything else by mouth or lie down for the next 30 min.  Qty: 4 tablet, Refills: 11    Associated Diagnoses: Compression fracture of body of thoracic vertebra (Multi)      ALPRAZolam (Xanax) 0.25 mg tablet Take 1 tablet (0.25 mg) by mouth 2 times a day as needed for anxiety.  Qty: 60 tablet, Refills: 0    Associated Diagnoses: Mood disorder (CMS-HCC)      aspirin 81 mg EC tablet Take 1 tablet (81 mg) by mouth once daily.      buPROPion XL (Wellbutrin XL) 150 mg 24 hr tablet Take 1 tablet (150 mg) by mouth 2 times a day.  Qty: 180 tablet, Refills: 3    Associated Diagnoses: Anxiety      ketoconazole (NIZOral) 2 % shampoo Apply once to face prn rash  Qty: 120 mL, Refills: 2    Associated Diagnoses: Rash      levothyroxine (Synthroid, Levoxyl) 125 mcg tablet Take 1 tablet (125 mcg) by mouth once daily.  Qty: 90 tablet, Refills: 3    Associated Diagnoses: Hypothyroidism, adult      rosuvastatin (Crestor) 20 mg tablet Take 1 tablet (20 mg) by mouth once daily at bedtime.      traMADol (Ultram) 50 mg tablet Take 1 tablet (50 mg) by mouth 2 times a day as needed for moderate pain (4 - 6).  Qty: 60 tablet, Refills: 0    Associated Diagnoses: Spine degeneration      !! valsartan (Diovan) 80 mg tablet Take 1 tablet (80 mg) by mouth once daily as needed (elevated BP).      atenolol (Tenormin) 50 mg tablet Take 1 tablet (50 mg) by mouth once daily.  Qty: 90 tablet, Refills: 3    Associated Diagnoses: Primary hypertension      DULoxetine (Cymbalta) 20 mg DR  capsule Take 1 capsule (20 mg) by mouth once daily. Swallow whole. Do not crush or chew.  Qty: 30 capsule, Refills: 1    Associated Diagnoses: Mood disorder (CMS-HCC)      !! valsartan (Diovan) 160 mg tablet Take 1 tablet (160 mg) by mouth once daily as needed (for elevated BP).  Qty: 90 tablet, Refills: 2    Associated Diagnoses: Primary hypertension      zolpidem (Ambien) 5 mg tablet Take 1 tablet (5 mg) by mouth as needed at bedtime for sleep.  Qty: 30 tablet, Refills: 3    Associated Diagnoses: Primary insomnia       !! - Potential duplicate medications found. Please discuss with provider.          ALLERGIES     Patient has no known allergies.    FAMILY HISTORY       Family History   Problem Relation Name Age of Onset    Coronary artery disease Mother      Atrial fibrillation Mother      Hypertension Mother      Heart attack Mother      Stroke Mother      Coronary artery disease Father      Coronary artery disease Sister      Hypertension Sister      Colon cancer Paternal Grandfather  70          SOCIAL HISTORY       Social History     Socioeconomic History    Marital status:      Spouse name: Not on file    Number of children: Not on file    Years of education: Not on file    Highest education level: Not on file   Occupational History    Not on file   Tobacco Use    Smoking status: Former     Current packs/day: 0.00     Types: Cigarettes     Quit date:      Years since quittin.3    Smokeless tobacco: Never   Substance and Sexual Activity    Alcohol use: Yes     Comment: rarely    Drug use: Never    Sexual activity: Defer   Other Topics Concern    Not on file   Social History Narrative    Not on file     Social Determinants of Health     Financial Resource Strain: Low Risk  (2024)    Overall Financial Resource Strain (CARDIA)     Difficulty of Paying Living Expenses: Not hard at all   Food Insecurity: No Food Insecurity (4/15/2024)    Received from [facility] Vital Sign      Worried About Running Out of Food in the Last Year: Never true     Ran Out of Food in the Last Year: Never true   Transportation Needs: No Transportation Needs (5/2/2024)    PRAPARE - Transportation     Lack of Transportation (Medical): No     Lack of Transportation (Non-Medical): No   Physical Activity: Not on file   Stress: Not on file   Social Connections: Not on file   Intimate Partner Violence: Not on file   Housing Stability: Low Risk  (5/2/2024)    Housing Stability Vital Sign     Unable to Pay for Housing in the Last Year: No     Number of Places Lived in the Last Year: 1     Unstable Housing in the Last Year: No     PHYSICAL EXAM    (up to 7 for level 4, 8 or more for level 5)     ED Triage Vitals [05/02/24 1626]   Temperature Heart Rate Respirations BP   36 °C (96.8 °F) 78 18 147/79      Pulse Ox Temp Source Heart Rate Source Patient Position   99 % Temporal Monitor Sitting      BP Location FiO2 (%)     Right arm --       Physical Exam     DIAGNOSTIC RESULTS     LABS:  Labs Reviewed   COMPREHENSIVE METABOLIC PANEL - Abnormal       Result Value    Glucose 89      Sodium 142      Potassium 3.6      Chloride 108 (*)     Bicarbonate 27      Anion Gap 11      Urea Nitrogen 26 (*)     Creatinine 0.62      eGFR >90      Calcium 9.3      Albumin 3.9      Alkaline Phosphatase 67      Total Protein 6.6      AST 27      Bilirubin, Total 0.3      ALT 21     TSH WITH REFLEX TO FREE T4 IF ABNORMAL - Abnormal    Thyroid Stimulating Hormone 5.42 (*)     Narrative:     TSH testing is performed using different testing methodology at [facility] than at other [facility]. Direct result comparisons should only be made within the same method.     AMMONIA - Abnormal    Ammonia 11 (*)    CBC WITH AUTO DIFFERENTIAL - Abnormal    WBC 3.9 (*)     nRBC 0.0      RBC 3.65 (*)     Hemoglobin 11.8 (*)     Hematocrit 37.0       (*)     MCH 32.3      MCHC 31.9 (*)     RDW 13.0      Platelets 201      Neutrophils %  50.1      Immature Granulocytes %, Automated 0.3      Lymphocytes % 36.7      Monocytes % 10.0      Eosinophils % 2.6      Basophils % 0.3      Neutrophils Absolute 1.96      Immature Granulocytes Absolute, Automated 0.01      Lymphocytes Absolute 1.43      Monocytes Absolute 0.39      Eosinophils Absolute 0.10      Basophils Absolute 0.01     TROPONIN I, HIGH SENSITIVITY - Normal    Troponin I, High Sensitivity 5      Narrative:     Less than 99th percentile of normal range cutoff-  Female and children under 18 years old <14 ng/L; Male <21 ng/L: Negative  Repeat testing should be performed if clinically indicated.     Female and children under 18 years old 14-50 ng/L; Male 21-50 ng/L:  Consistent with possible cardiac damage and possible increased clinical   risk. Serial measurements may help to assess extent of myocardial damage.     >50 ng/L: Consistent with cardiac damage, increased clinical risk and  myocardial infarction. Serial measurements may help assess extent of   myocardial damage.      NOTE: Children less than 1 year old may have higher baseline troponin   levels and results should be interpreted in conjunction with the overall   clinical context.     NOTE: Troponin I testing is performed using a different   testing methodology at [facility] than at other   [facility]. Direct result comparisons should only   be made within the same method.   CREATINE KINASE - Normal    Creatine Kinase 25     PROTIME-INR - Normal    Protime 10.8      INR 1.0     SARS-COV-2 PCR - Normal    Coronavirus 2019, PCR Not Detected      Narrative:     This assay has received FDA Emergency Use Authorization (EUA) and is only authorized for the duration of time that circumstances exist to justify the authorization of the emergency use of in vitro diagnostic tests for the detection of SARS-CoV-2 virus and/or diagnosis of COVID-19 infection under section 564(b)(1) of the Act, 21 U.S.C. 360bbb-3(b)(1). This assay  is an in vitro diagnostic nucleic acid amplification test for the qualitative detection of SARS-CoV-2 from nasopharyngeal specimens and has been validated for use at [facility]. Negative results do not preclude COVID-19 infections and should not be used as the sole basis for diagnosis, treatment, or other management decisions.     INFLUENZA A AND B PCR - Normal    Flu A Result Not Detected      Flu B Result Not Detected      Narrative:     This assay is an in vitro diagnostic multiplex nucleic acid amplification test for the detection and discrimination of Influenza A & B from nasopharyngeal specimens, and has been validated for use at [facility]. Negative results do not preclude Influenza A/B infections, and should not be used as the sole basis for diagnosis, treatment, or other management decisions. If Influenza A/B and RSV PCR results are negative, testing for Parainfluenza virus, Adenovirus and Metapneumovirus is routinely performed for [facility] pediatric oncology and intensive care inpatients, and is available on other patients by placing an add-on request.   THYROXINE, FREE - Normal    Thyroxine, Free 0.76      Narrative:     Thyroxine Free testing is performed using different testing methodology at [facility] than at [facility]. Direct result comparisons should only be made within the same method.    Biotin can cause falsely elevated free T4 results. Patients taking a Biotin dose of up to 10 mg/day should refrain from taking Biotin for 24 hours before sample collection. Patient taking a Biotin dose of >10 mg/day should consult with their physician or the laboratory before the blood draw.   URINE CULTURE   URINALYSIS WITH REFLEX MICROSCOPIC   FOLATE   URINALYSIS WITH REFLEX CULTURE AND MICROSCOPIC    Narrative:     The following orders were created for panel order Urinalysis with Reflex Culture and Microscopic.  Procedure                                Abnormality         Status                     ---------                               -----------         ------                     Urinalysis with Reflex C...[[id number]]                                                 Extra Urine Gray Tube[[id number]]                                                         Please view results for these tests on the individual orders.   URINALYSIS WITH REFLEX CULTURE AND MICROSCOPIC   EXTRA URINE GRAY TUBE   CBC   COMPREHENSIVE METABOLIC PANEL       All other labs were within normal range or not returned as of this dictation.    Imaging  CT head wo IV contrast   Final Result   1. No acute intracranial abnormality.                       MACRO:   None.        Signed by: Edward Subramanian 5/2/2024 7:25 PM   Dictation workstation:   [id number]      XR chest 1 view   Final Result   No acute pulmonary process.   Signed by Shyam Cruz, DO           Procedures  Procedures     EMERGENCY DEPARTMENT COURSE/MDM:   Theodora Goel is a 78 y.o. female presenting to the ED for evaluation of had concerns including r/o uti..   Medical Decision Making  This 78-year-old female was brought to the Emergency Department due to concerns of generalized weakness and confusion, as noted by an altered mental status. Initial vital signs were within normal limits.  An EKG revealed normal sinus rhythm with a ventricular rate of 63, alongside a first-degree block with MN interval of 216 ms and a QTc of 442 ms. Laboratory investigations demonstrated leukopenia (3.4), anemia (11.8), and hyperchloremia, with a slight increase in blood urea nitrogenat 26. Additionally, CK, troponin, thyroxine, COVID, and flu tests returned negative, while thyroid-stimulating hormone (TSH) levels were slightly elevated at 5.42. Urinalysis, folate, and B12 levels were pending.    Imaging studies, including chest x-ray and CT head, showed no acute cardiopulmonary processes or evidence of stroke or hemorrhage,  respectively. Intervention involved intravenous fluid administration. Given the generalized weakness, a decision was made to admit the patient for further workup and potential placement.    External records, including outpatient, primary care physician (PCP) records, and prior discharge summaries, were reviewed. The case was discussed with the ED attending physician, who concurred with the plan. Patient or family members were counseled regarding the laboratory tests, imaging results, likely diagnosis, and the proposed plan of action, with all questions addressed.      Srinivasan Chang MD  Internal Medicine, PGY-1    The above documentation was generated using speech recognition software and may contain dictation errors due to software limitations.    Diagnoses as of 05/03/24 0116   Generalized weakness         ED Medications administered this visit:    Medications   pantoprazole (ProtoNix) EC tablet 40 mg (has no administration in time range)     Or   pantoprazole (ProtoNix) injection 40 mg (has no administration in time range)   acetaminophen (Tylenol) tablet 650 mg (has no administration in time range)     Or   acetaminophen (Tylenol) oral liquid 650 mg (has no administration in time range)     Or   acetaminophen (Tylenol) suppository 650 mg (has no administration in time range)   acetaminophen (Tylenol) tablet 650 mg (has no administration in time range)     Or   acetaminophen (Tylenol) oral liquid 650 mg (has no administration in time range)     Or   acetaminophen (Tylenol) suppository 650 mg (has no administration in time range)   ondansetron ODT (Zofran-ODT) disintegrating tablet 4 mg (has no administration in time range)     Or   ondansetron (Zofran) injection 4 mg (has no administration in time range)   metoclopramide (Reglan) tablet 10 mg (has no administration in time range)     Or   metoclopramide (Reglan) injection 10 mg (has no administration in time range)   melatonin tablet 3 mg (has no administration in  time range)   sodium chloride 0.9% infusion (75 mL/hr intravenous New Bag 5/2/24 2421)   polyethylene glycol (Glycolax, Miralax) packet 17 g (has no administration in time range)   haloperidol lactate (Haldol) injection 5 mg (has no administration in time range)   lactated Ringer's bolus 1,000 mL (0 mL intravenous Stopped 5/2/24 4692)       New Prescriptions from this visit:    Current Discharge Medication List          Final Impression:   1. Generalized weakness          (Please note that portions of this note were completed with a voice recognition program.  Efforts were made to edit the dictations but occasionally words are mis-transcribed.)     Srinivasan Chang MD  Resident  05/03/24 2730

## 2025-06-25 ENCOUNTER — APPOINTMENT (OUTPATIENT)
Dept: CARDIOLOGY | Facility: CLINIC | Age: 80
End: 2025-06-25

## 2025-06-25 VITALS
HEIGHT: 66 IN | DIASTOLIC BLOOD PRESSURE: 62 MMHG | SYSTOLIC BLOOD PRESSURE: 108 MMHG | BODY MASS INDEX: 22.34 KG/M2 | HEART RATE: 85 BPM | WEIGHT: 139 LBS

## 2025-06-25 DIAGNOSIS — I95.89 OTHER SPECIFIED HYPOTENSION: ICD-10-CM

## 2025-06-25 DIAGNOSIS — I47.10 PAROXYSMAL SVT (SUPRAVENTRICULAR TACHYCARDIA): ICD-10-CM

## 2025-06-25 DIAGNOSIS — R63.4 WEIGHT LOSS: ICD-10-CM

## 2025-06-25 PROBLEM — I95.9 ARTERIAL HYPOTENSION: Status: ACTIVE | Noted: 2025-06-25

## 2025-06-25 PROCEDURE — 1036F TOBACCO NON-USER: CPT | Performed by: INTERNAL MEDICINE

## 2025-06-25 PROCEDURE — 1159F MED LIST DOCD IN RCRD: CPT | Performed by: INTERNAL MEDICINE

## 2025-06-25 PROCEDURE — 3074F SYST BP LT 130 MM HG: CPT | Performed by: INTERNAL MEDICINE

## 2025-06-25 PROCEDURE — G2211 COMPLEX E/M VISIT ADD ON: HCPCS | Performed by: INTERNAL MEDICINE

## 2025-06-25 PROCEDURE — 99214 OFFICE O/P EST MOD 30 MIN: CPT | Performed by: INTERNAL MEDICINE

## 2025-06-25 PROCEDURE — 3078F DIAST BP <80 MM HG: CPT | Performed by: INTERNAL MEDICINE

## 2025-06-25 RX ORDER — LOSARTAN POTASSIUM 25 MG/1
TABLET ORAL
Start: 2025-06-25

## 2025-06-25 NOTE — PROGRESS NOTES
Patient:  Theodora Goel  YOB: 1945  MRN: [id number]       Impression/Plan:     Diagnoses and all orders for this visit:  Other specified hypotension  -     6 months ago blood pressure was a bit elevated.  She however continues to lose weight and I suspect that is the reason she needs less medication.  Will reduce losartan to just 25 Monday Wednesday Friday.  Will plan to reassess in 4 months.  She does take her blood pressures and will call me if blood pressure remains decreased.  Paroxysmal SVT (supraventricular tachycardia)  -    No clinical recurrence on low-dose flecainide beta-blocker continue same  Weight loss  -     She needs to follow regularly with Dr. Ochoa, encouraged her to be sure she is eating regularly      Chief Complaint/Active Symptoms:      Chief Complaint   Patient presents with    Follow-up     Presents today for follow up of HTN/BRADYCARDIA       Theodora Goel is a 79 y.o. female who presents with SVT, coronary disease based on calcium score 890, hyperlipidemia.  She had had a loop recorder placed for evaluation of SVT and prior atrial fibrillation and on a device check described March 23, 2020 had had episodes of atrial fibrillation.  1 episode lasting for over 3 hours.  Over the subsequent year however burden was felt to be quite low and anticoagulation was not instituted.     Was begun on flecainide May 2024 for bursts of SVT while hospitalized for severe urinary tract infection and confusion.    1/18/2414 day monitoring: No A-fib or flutter.  1 episode accelerated junctional rhythm with short NH interval.      1/18/2024 echocardiogram   1. Left ventricular systolic function is normal with a 65% estimated ejection fraction.   2. Spectral Doppler shows an impaired relaxation pattern of left ventricular diastolic filling.   3. There is moderate concentric left ventricular hypertrophy.   4. Normal chamber sizes.   5. No significant valvular heart disease.    I had  seen her last 2/26/2025 not long after flecainide beta-blocker reduced to rule out heart rate to increase this allowed her fatigue as well as bradycardia to markedly improved.  Blood pressure was suboptimally controlled and losartan was increased.  ECG at that time sinus rhythm    5/9/2025 normal electrolytes BUN of 33 creatinine 0.8 normal liver function, normal TSH CBC normal  December 2024 cholesterol 153 HDL 90 LDL 48             She denies angina, dyspnea, palpitation, edema or syncope.  She has had no symptoms of claudication or neurologic deterioration.  There have been no hospitalizations or emergency room visits since last office visit.    She does report episodic dizziness when her blood pressure is low she has had at home her blood pressure occasionally is 85-90 and she brings a list of her blood pressures and indeed a few times a week it is low other days 120-140.  She has had no chest pain.  She says she is eating and drinking well.  Of concern however is that she is lost nearly 30 pounds in the last year.  She said she is not making an effort to do so.  She is unaware of recurrent SVT.        Review of Systems: Unremarkable except as noted above    Meds     Current Outpatient Medications   Medication Instructions    albuterol 90 mcg/actuation inhaler 2 puffs, inhalation, Every 4 hours PRN    alendronate (FOSAMAX) 70 mg, oral, Every 7 days, Take in the morning with a full glass of water, on an empty stomach, and do not take anything else by mouth or lie down for the next 30 min.    ARIPiprazole (ABILIFY) 5 mg, oral, Daily    aspirin 81 mg, Daily    celecoxib (CeleBREX) 200 mg capsule Please take 1 capsule by mouth every 12 hours as needed for pain.  Always with food.  No more than 2 capsules please in 24 hours.  Thank you.    cholecalciferol (Vitamin D-3) 1.25 mg (50,000 units) tablet Please take 1 tablet by mouth Sundays, with lunch and a full glass of water.  Thank you.    cyanocobalamin (Vitamin  B-12) 1,000 mcg tablet Please take 1 tablet by mouth with LUNCH every day.  Thank you.    donepezil (Aricept) 5 mg tablet Please take 1 tablet by mouth at bedtime.  Thank you.    DULoxetine (CYMBALTA) 20 mg, oral, 2 times daily, Do not crush or chew.    fexofenadine (ALLEGRA) 180 mg, oral, Daily    flecainide (Tambocor) 50 mg tablet 1 TABLET DAILY    fluticasone (Flonase) 50 mcg/actuation nasal spray Use 1 spray to each nostril after beakfast-time and again after suppertime.  Shake gently. Before first use, prime pump. After use, clean tip and replace cap.    ipratropium (Atrovent) 42 mcg (0.06 %) nasal spray Please use 2 sprays to each nostril 3 times a day.    levocetirizine (XYZAL) 5 mg, oral, Every evening    levothyroxine (Synthroid, Levoxyl) 125 mcg tablet Please take 1 tablet by mouth before breakfast every morning.  Thank you.    losartan (Cozaar) 25 mg tablet Please take 1 tablet by mouth with BREAKFAST every morning.  Thank you.    magnesium oxide (Mag-Ox) 250 mg magnesium tablet Please take 1 tablet by mouth with food twice a day.  Always with food.  Watch out for diarrhea, abdominal distress.  Thank you.    metoprolol succinate XL (Toprol-XL) 25 mg 24 hr tablet Please take 1 tablet by mouth with SUPPER every evening.  Do not crush or chew.  Thank you.    mirtazapine (Remeron) 7.5 mg tablet Please take 1 tablet by mouth every evening at bedtime.  Thank you.    multivit with min-folic acid (One-A-Day Women's 50 Plus) 0.4 mg tablet Take by mouth.    rosuvastatin (CRESTOR) 20 mg, oral, Nightly    tiZANidine (Zanaflex) 2 mg tablet Please take 1 tablet by mouth every 6-8 hours as needed for musculoskeletal pain.  Thank you.  Watch out for sleepiness.  Never take with alcohol.        Allergies   Allergies[1]      Annotated Problems     Specialty Problems          Cardiology Problems    Abnormal ECG    Coronary artery disease    Echocardiogram 6/2/2022 normal study  March 2021 Lexiscan perfusion study normal        coronary disease based on calcium score 890 CT 2020         VARGAS (dyspnea on exertion)    Hyperlipidemia    Paroxysmal atrial fibrillation (Multi)    Loop recorder assessments   -1/24/2023: 112-second episode of tachycardia 1 to     2-second pauses.  One 6-second episode sinus rhythm 42 -December 2022 20 episodes of tachycardia consistent with SVT 7-19 seconds in duration.    -March 2020 several episodes atrial fibrillation one 3 hours in duration         Raynaud's phenomenon    Paroxysmal SVT (supraventricular tachycardia)    Essential hypertension    Pure hypercholesterolemia with target low density lipoprotein (LDL) cholesterol less than 70 mg/dL    Bradycardia        Problem List     Patient Active Problem List    Diagnosis Date Noted    Muscle spasm 05/16/2025    Seasonal allergic rhinitis due to pollen 05/16/2025    Major depressive disorder, recurrent, moderate 04/04/2025    Bradycardia 01/27/2025    Frailty syndrome in geriatric patient 09/06/2024    Sensorineural hearing loss (SNHL) of both ears 09/06/2024    At risk for polypharmacy 09/06/2024    Polyarthralgia 09/06/2024    Hyperglycemia 09/06/2024    Chronic obstructive pulmonary disease (Multi) 09/06/2024    Anemia due to vitamin B12 deficiency 09/06/2024    Compression fracture of body of thoracic vertebra (Multi) 09/06/2024    Pure hypercholesterolemia with target low density lipoprotein (LDL) cholesterol less than 70 mg/dL 09/06/2024    Mild cognitive impairment 09/06/2024    History of delirium 09/06/2024    Essential hypertension 06/20/2024    Bilateral edema of lower extremity 06/20/2024    Fatigue 05/05/2024    Urinary incontinence 05/02/2024    Dizziness 11/14/2023    Ataxic gait 11/14/2023    Paroxysmal SVT (supraventricular tachycardia) 11/14/2023    Long term (current) use of opiate analgesic 06/16/2023    Abnormal CBC 02/03/2023    Abnormal ECG 02/03/2023    Anxiety 02/03/2023    Asthma 02/03/2023    Atypical chest pain 02/03/2023     "Atypical mole 02/03/2023    Balance problem 02/03/2023    Coronary artery disease 02/03/2023    Weakness 02/03/2023    Eczema 02/03/2023    Seborrheic dermatitis 02/03/2023    Frequent falls 02/03/2023    Gallstones 02/03/2023    GERD (gastroesophageal reflux disease) 02/03/2023    Hyperlipidemia 02/03/2023    Hypomagnesemia 02/03/2023    Hypothyroidism, adult 02/03/2023    Insomnia 02/03/2023    Left lumbar radiculopathy 02/03/2023    Low back pain 02/03/2023    Magnesium deficiency 02/03/2023    Depression with anxiety 02/03/2023    Paroxysmal atrial fibrillation (Multi) 02/03/2023    Postural dizziness 02/03/2023    Primary osteoarthritis of knees, bilateral 02/03/2023    Raynaud's phenomenon 02/03/2023    VARGAS (dyspnea on exertion) 02/03/2023    Shortness of breath 02/03/2023    Spine degeneration 02/03/2023    Tinnitus 02/03/2023    Vitamin B12 deficiency 02/03/2023    Vitamin D deficiency 02/03/2023    Weight loss 02/03/2023       Objective:     Vitals:    06/25/25 1042   BP: 108/62   BP Location: Left arm   Patient Position: Sitting   Pulse: 85   Weight: 63 kg (139 lb)   Height: 1.676 m (5' 6\")      Wt Readings from Last 4 Encounters:   06/25/25 63 kg (139 lb)   06/06/25 63.2 kg (139 lb 4.8 oz)   05/16/25 63.1 kg (139 lb 3.2 oz)   04/04/25 68 kg (150 lb)           LAB:     Lab Results   Component Value Date    WBC 6.9 05/09/2025    HGB 12.9 05/09/2025    HCT 38.7 05/09/2025     05/09/2025    CHOL 154 05/09/2025    TRIG 128 05/09/2025    HDL 78 05/09/2025    ALT 10 05/09/2025    AST 17 05/09/2025     05/09/2025    K 4.4 05/09/2025     05/09/2025    CREATININE 0.80 05/09/2025    BUN 33 (H) 05/09/2025    CO2 28 05/09/2025    TSH 3.35 05/09/2025    INR 1.0 05/02/2024    HGBA1C 5.3 08/30/2024         Physical Exam     General Appearance: alert and oriented to person, place and time, in no acute distress  Cardiovascular: normal rate, regular rhythm, normal S1 and S2, no murmurs, rubs, clicks, or " gallops,  no JVD  Pulmonary/Chest: clear to auscultation bilaterally- no wheezes, rales or rhonchi, normal air movement, no respiratory distress  Abdomen: soft, non-tender, non-distended, normal bowel sounds, no masses   Extremities: no cyanosis, clubbing or edema  Skin: warm and dry, no rash or erythema  Eyes: EOMI  Neck: supple and non-tender without mass, no thyromegaly   Neurological: alert, oriented, normal speech, no focal findings or movement disorder noted        Provider attestation-scribe documentation  Any medical record entries made by the scribe were at my discretion and personally dictated by me.  I have reviewed the chart and agree that the record accurately reflects my personal performance of the history, physical exam, discussion and plan.                 [1] No Known Allergies

## 2025-07-07 DIAGNOSIS — Z87.898 HISTORY OF DELIRIUM: ICD-10-CM

## 2025-07-07 DIAGNOSIS — G31.84 MILD COGNITIVE IMPAIRMENT: ICD-10-CM

## 2025-07-07 DIAGNOSIS — F41.8 DEPRESSION WITH ANXIETY: ICD-10-CM

## 2025-07-08 RX ORDER — ARIPIPRAZOLE 5 MG/1
5 TABLET ORAL DAILY
Qty: 90 TABLET | Refills: 0 | Status: SHIPPED | OUTPATIENT
Start: 2025-07-08 | End: 2025-10-06

## 2025-07-08 RX ORDER — DONEPEZIL HYDROCHLORIDE 5 MG/1
TABLET, FILM COATED ORAL
Qty: 90 TABLET | Refills: 0 | Status: SHIPPED | OUTPATIENT
Start: 2025-07-08

## 2025-08-19 DIAGNOSIS — G89.29 CHRONIC MIDLINE LOW BACK PAIN WITH SCIATICA, SCIATICA LATERALITY UNSPECIFIED: ICD-10-CM

## 2025-08-19 DIAGNOSIS — S22.000A COMPRESSION FRACTURE OF BODY OF THORACIC VERTEBRA (MULTI): ICD-10-CM

## 2025-08-19 DIAGNOSIS — M54.40 CHRONIC MIDLINE LOW BACK PAIN WITH SCIATICA, SCIATICA LATERALITY UNSPECIFIED: ICD-10-CM

## 2025-08-19 DIAGNOSIS — I47.10 PAROXYSMAL SVT (SUPRAVENTRICULAR TACHYCARDIA): ICD-10-CM

## 2025-08-19 DIAGNOSIS — M62.838 MUSCLE SPASM: ICD-10-CM

## 2025-08-19 DIAGNOSIS — E03.9 HYPOTHYROIDISM, ADULT: ICD-10-CM

## 2025-08-19 DIAGNOSIS — I10 ESSENTIAL HYPERTENSION: ICD-10-CM

## 2025-08-19 DIAGNOSIS — I48.0 PAROXYSMAL ATRIAL FIBRILLATION (MULTI): ICD-10-CM

## 2025-08-22 RX ORDER — METOPROLOL SUCCINATE 25 MG/1
TABLET, EXTENDED RELEASE ORAL
Qty: 90 TABLET | Refills: 0 | Status: SHIPPED | OUTPATIENT
Start: 2025-08-22

## 2025-08-22 RX ORDER — TIZANIDINE 2 MG/1
TABLET ORAL
Qty: 30 TABLET | Refills: 1 | Status: SHIPPED | OUTPATIENT
Start: 2025-08-22

## 2025-08-22 RX ORDER — LEVOTHYROXINE SODIUM 125 UG/1
TABLET ORAL
Qty: 90 TABLET | Refills: 3 | Status: SHIPPED | OUTPATIENT
Start: 2025-08-22

## 2025-08-25 DIAGNOSIS — E03.9 HYPOTHYROIDISM, ADULT: ICD-10-CM

## 2025-08-25 RX ORDER — LEVOTHYROXINE SODIUM 125 UG/1
TABLET ORAL
Qty: 90 TABLET | Refills: 3 | Status: SHIPPED | OUTPATIENT
Start: 2025-08-25

## 2025-10-10 ENCOUNTER — APPOINTMENT (OUTPATIENT)
Dept: PRIMARY CARE | Facility: CLINIC | Age: 80
End: 2025-10-10
Payer: MEDICARE

## 2025-10-20 ENCOUNTER — APPOINTMENT (OUTPATIENT)
Dept: CARDIOLOGY | Facility: CLINIC | Age: 80
End: 2025-10-20
Payer: MEDICARE

## 2025-10-21 ENCOUNTER — APPOINTMENT (OUTPATIENT)
Dept: CARDIOLOGY | Facility: CLINIC | Age: 80
End: 2025-10-21
Payer: MEDICARE